# Patient Record
Sex: MALE | Race: WHITE | NOT HISPANIC OR LATINO | Employment: OTHER | URBAN - METROPOLITAN AREA
[De-identification: names, ages, dates, MRNs, and addresses within clinical notes are randomized per-mention and may not be internally consistent; named-entity substitution may affect disease eponyms.]

---

## 2018-10-08 DIAGNOSIS — R74.01 ELEVATED SGOT (AST): Primary | ICD-10-CM

## 2018-10-12 ENCOUNTER — HOSPITAL ENCOUNTER (OUTPATIENT)
Dept: RADIOLOGY | Facility: HOSPITAL | Age: 78
Discharge: HOME | End: 2018-10-12
Attending: FAMILY MEDICINE
Payer: MEDICARE

## 2018-10-12 DIAGNOSIS — R74.01 ELEVATED SGOT (AST): ICD-10-CM

## 2018-10-12 PROCEDURE — 76705 ECHO EXAM OF ABDOMEN: CPT | Mod: TC

## 2018-10-12 PROCEDURE — 76705 ECHO EXAM OF ABDOMEN: CPT | Mod: 26,,, | Performed by: RADIOLOGY

## 2018-10-19 ENCOUNTER — LAB VISIT (OUTPATIENT)
Dept: LAB | Facility: HOSPITAL | Age: 78
End: 2018-10-19
Attending: INTERNAL MEDICINE
Payer: MEDICARE

## 2018-10-19 ENCOUNTER — OFFICE VISIT (OUTPATIENT)
Dept: FAMILY MEDICINE | Facility: CLINIC | Age: 78
End: 2018-10-19
Payer: MEDICARE

## 2018-10-19 VITALS
DIASTOLIC BLOOD PRESSURE: 90 MMHG | HEART RATE: 77 BPM | OXYGEN SATURATION: 97 % | WEIGHT: 157.19 LBS | BODY MASS INDEX: 23.28 KG/M2 | SYSTOLIC BLOOD PRESSURE: 150 MMHG | HEIGHT: 69 IN | TEMPERATURE: 98 F

## 2018-10-19 DIAGNOSIS — I10 HYPERTENSION, ESSENTIAL, BENIGN: ICD-10-CM

## 2018-10-19 DIAGNOSIS — Z13.220 LIPID SCREENING: ICD-10-CM

## 2018-10-19 DIAGNOSIS — Z13.6 ENCOUNTER FOR SCREENING FOR CARDIOVASCULAR DISORDERS: ICD-10-CM

## 2018-10-19 DIAGNOSIS — L60.9 FINGERNAIL ABNORMALITIES: Primary | ICD-10-CM

## 2018-10-19 DIAGNOSIS — R79.9 ABNORMAL FINDING OF BLOOD CHEMISTRY: ICD-10-CM

## 2018-10-19 DIAGNOSIS — N50.812 LEFT TESTICULAR PAIN: ICD-10-CM

## 2018-10-19 DIAGNOSIS — F33.1 MODERATE EPISODE OF RECURRENT MAJOR DEPRESSIVE DISORDER: ICD-10-CM

## 2018-10-19 DIAGNOSIS — M79.7 FIBROMYALGIA: ICD-10-CM

## 2018-10-19 LAB
ALBUMIN SERPL BCP-MCNC: 4.2 G/DL
ALP SERPL-CCNC: 69 U/L
ALT SERPL W/O P-5'-P-CCNC: 23 U/L
ANION GAP SERPL CALC-SCNC: 9 MMOL/L
AST SERPL-CCNC: 23 U/L
BASOPHILS # BLD AUTO: 0.03 K/UL
BASOPHILS NFR BLD: 0.5 %
BILIRUB SERPL-MCNC: 0.8 MG/DL
BUN SERPL-MCNC: 28 MG/DL
CALCIUM SERPL-MCNC: 9.7 MG/DL
CHLORIDE SERPL-SCNC: 107 MMOL/L
CHOLEST SERPL-MCNC: 271 MG/DL
CHOLEST/HDLC SERPL: 8 {RATIO}
CO2 SERPL-SCNC: 25 MMOL/L
CREAT SERPL-MCNC: 1.1 MG/DL
DIFFERENTIAL METHOD: ABNORMAL
EOSINOPHIL # BLD AUTO: 0.1 K/UL
EOSINOPHIL NFR BLD: 2.1 %
ERYTHROCYTE [DISTWIDTH] IN BLOOD BY AUTOMATED COUNT: 13.7 %
EST. GFR  (AFRICAN AMERICAN): >60 ML/MIN/1.73 M^2
EST. GFR  (NON AFRICAN AMERICAN): >60 ML/MIN/1.73 M^2
GLUCOSE SERPL-MCNC: 95 MG/DL
HCT VFR BLD AUTO: 43.8 %
HDLC SERPL-MCNC: 34 MG/DL
HDLC SERPL: 12.5 %
HGB BLD-MCNC: 15 G/DL
LDLC SERPL CALC-MCNC: 178.6 MG/DL
LYMPHOCYTES # BLD AUTO: 1.3 K/UL
LYMPHOCYTES NFR BLD: 21.9 %
MCH RBC QN AUTO: 31.6 PG
MCHC RBC AUTO-ENTMCNC: 34.2 G/DL
MCV RBC AUTO: 92 FL
MONOCYTES # BLD AUTO: 0.3 K/UL
MONOCYTES NFR BLD: 5.5 %
NEUTROPHILS # BLD AUTO: 4.1 K/UL
NEUTROPHILS NFR BLD: 70 %
NONHDLC SERPL-MCNC: 237 MG/DL
PLATELET # BLD AUTO: 202 K/UL
PMV BLD AUTO: 9.3 FL
POTASSIUM SERPL-SCNC: 4.5 MMOL/L
PROT SERPL-MCNC: 7.2 G/DL
RBC # BLD AUTO: 4.75 M/UL
SODIUM SERPL-SCNC: 141 MMOL/L
TRIGL SERPL-MCNC: 292 MG/DL
TSH SERPL DL<=0.005 MIU/L-ACNC: 2.55 UIU/ML
WBC # BLD AUTO: 5.79 K/UL

## 2018-10-19 PROCEDURE — 80061 LIPID PANEL: CPT

## 2018-10-19 PROCEDURE — 36415 COLL VENOUS BLD VENIPUNCTURE: CPT

## 2018-10-19 PROCEDURE — 99214 OFFICE O/P EST MOD 30 MIN: CPT | Mod: PBBFAC,PO | Performed by: INTERNAL MEDICINE

## 2018-10-19 PROCEDURE — 99999 PR PBB SHADOW E&M-EST. PATIENT-LVL IV: CPT | Mod: PBBFAC,,, | Performed by: INTERNAL MEDICINE

## 2018-10-19 PROCEDURE — 1101F PT FALLS ASSESS-DOCD LE1/YR: CPT | Mod: CPTII,,, | Performed by: INTERNAL MEDICINE

## 2018-10-19 PROCEDURE — 3080F DIAST BP >= 90 MM HG: CPT | Mod: CPTII,,, | Performed by: INTERNAL MEDICINE

## 2018-10-19 PROCEDURE — 80053 COMPREHEN METABOLIC PANEL: CPT

## 2018-10-19 PROCEDURE — 3077F SYST BP >= 140 MM HG: CPT | Mod: CPTII,,, | Performed by: INTERNAL MEDICINE

## 2018-10-19 PROCEDURE — 83036 HEMOGLOBIN GLYCOSYLATED A1C: CPT

## 2018-10-19 PROCEDURE — 99204 OFFICE O/P NEW MOD 45 MIN: CPT | Mod: S$PBB,,, | Performed by: INTERNAL MEDICINE

## 2018-10-19 PROCEDURE — 85025 COMPLETE CBC W/AUTO DIFF WBC: CPT

## 2018-10-19 PROCEDURE — 84443 ASSAY THYROID STIM HORMONE: CPT
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
## 2018-10-20 LAB
ESTIMATED AVG GLUCOSE: 108 MG/DL
HBA1C MFR BLD HPLC: 5.4 %

## 2018-10-21 DIAGNOSIS — E78.2 MIXED HYPERLIPIDEMIA: Primary | ICD-10-CM

## 2018-10-21 RX ORDER — ATORVASTATIN CALCIUM 40 MG/1
40 TABLET, FILM COATED ORAL DAILY
Qty: 90 TABLET | Refills: 3 | Status: SHIPPED | OUTPATIENT
Start: 2018-10-21 | End: 2018-11-15

## 2018-11-07 ENCOUNTER — HOSPITAL ENCOUNTER (OUTPATIENT)
Dept: RADIOLOGY | Facility: HOSPITAL | Age: 78
Discharge: HOME | End: 2018-11-07
Attending: INTERNAL MEDICINE
Payer: MEDICARE

## 2018-11-07 DIAGNOSIS — N50.812 LEFT TESTICULAR PAIN: ICD-10-CM

## 2018-11-07 DIAGNOSIS — N43.3 HYDROCELE, UNSPECIFIED HYDROCELE TYPE: Primary | ICD-10-CM

## 2018-11-07 PROCEDURE — 76870 US EXAM SCROTUM: CPT | Mod: TC

## 2018-11-07 PROCEDURE — 76870 US EXAM SCROTUM: CPT | Mod: 26,,, | Performed by: RADIOLOGY

## 2018-11-15 ENCOUNTER — OFFICE VISIT (OUTPATIENT)
Dept: UROLOGY | Facility: CLINIC | Age: 78
End: 2018-11-15
Payer: MEDICARE

## 2018-11-15 VITALS
WEIGHT: 156.94 LBS | HEIGHT: 69 IN | HEART RATE: 70 BPM | BODY MASS INDEX: 23.25 KG/M2 | SYSTOLIC BLOOD PRESSURE: 138 MMHG | DIASTOLIC BLOOD PRESSURE: 80 MMHG

## 2018-11-15 DIAGNOSIS — N50.812 LEFT TESTICULAR PAIN: Primary | ICD-10-CM

## 2018-11-15 DIAGNOSIS — N43.3 BILATERAL HYDROCELE: ICD-10-CM

## 2018-11-15 PROCEDURE — 3075F SYST BP GE 130 - 139MM HG: CPT | Mod: CPTII,S$GLB,, | Performed by: NURSE PRACTITIONER

## 2018-11-15 PROCEDURE — 99204 OFFICE O/P NEW MOD 45 MIN: CPT | Mod: S$GLB,,, | Performed by: NURSE PRACTITIONER

## 2018-11-15 PROCEDURE — 1101F PT FALLS ASSESS-DOCD LE1/YR: CPT | Mod: CPTII,S$GLB,, | Performed by: NURSE PRACTITIONER

## 2018-11-15 PROCEDURE — 3079F DIAST BP 80-89 MM HG: CPT | Mod: CPTII,S$GLB,, | Performed by: NURSE PRACTITIONER

## 2018-11-15 PROCEDURE — 99999 PR PBB SHADOW E&M-EST. PATIENT-LVL III: CPT | Mod: PBBFAC,,, | Performed by: NURSE PRACTITIONER

## 2018-11-15 NOTE — LETTER
November 15, 2018      Antonieta Khalil MD  [address] [address]  [address]  [place] [address]           [facility]  [address] [facility] [address]  [place] [address]  Phone: [phone]  Fax: [phone]          Patient: Kierra Daugherty   MR Number: [id number]   YOB: 1940   Date of Visit: 11/15/2018       Dear Dr. Antonieta Khalil:    Thank you for referring Kierra Daugherty to me for evaluation. Attached you will find relevant portions of my assessment and plan of care.    If you have questions, please do not hesitate to call me. I look forward to following Kierra Daugherty along with you.    Sincerely,    Regina Hoover, GENET    Enclosure  CC:  No Recipients    If you would like to receive this communication electronically, please contact [email or web address] or [phone] to request more information on [employer] Link access.    For providers and/or their staff who would like to refer a patient to [facility], please contact us through our one-stop-shop provider referral line, [facility], at [phone].    If you feel you have received this communication in error or would no longer like to receive these types of communications, please e-mail [email or web address]

## 2018-11-15 NOTE — PROGRESS NOTES
"Subjective:       Patient ID: Kierra Daugherty is a 78 y.o. male who is a new patient was referred by Antonieta Khalil MD for evaluation of left testicular pain    Chief Complaint:   Chief Complaint   Patient presents with    Other     new pt coming in for hydrocele      Left testicular pain  Patient reports L testicular pain for "many years". Pain is aggravated by movement. He tells me that pain is alleviated with repositioning of scrotum. Denies any associated swelling with pain. There has not been any recent trauma Scrotal ultrasound ordered by PCP. Imaging showed bilateral small hydroceles and bilateral epididymal cysts. Denies dysuria or gross hematuria. There is not a history kidney stones. Patient tells me that he has taken himself off of all meds, because he says that medicine does not agree with him.  Patient repeatedly states that he thought he was coming to see a neurologist today. He is unsure why an appointment was made with urology     ACTIVE MEDICAL ISSUES:  Patient Active Problem List   Diagnosis    Idiopathic peripheral neuropathy    Fibromyalgia    Mixed hyperlipidemia    Left testicular pain    Bilateral hydrocele       PAST MEDICAL HISTORY  Past Medical History:   Diagnosis Date    Chronic kidney disease     Fibromyalgia     Fibromyalgia     HSV (herpes simplex virus) anogenital infection     Hypertension     does not take medication    Mental disorder     history of depression       PAST SURGICAL HISTORY:  Past Surgical History:   Procedure Laterality Date    APPENDECTOMY      Collapse Lung  1969    GUM SURGERY      HERNIA REPAIR      Left Shoulder      REPAIR, HERNIA, INGUINAL, WITHOUT HISTORY OF PRIOR REPAIR, AGE 6 YEARS OR OLDER Right 7/23/2013    Performed by [facility] at [facility] OR    tonsilect      TONSILLECTOMY, ADENOIDECTOMY         SOCIAL HISTORY:  Social History     Tobacco Use    Smoking status: Former Smoker     Start date: 11/8/1952     Last attempt to " quit: 4/10/1983     Years since quittin.6    Smokeless tobacco: Never Used   Substance Use Topics    Alcohol use: No     Comment: Heavy at one time.    Drug use: No       FAMILY HISTORY:  Family History   Problem Relation Age of Onset    Stroke Mother     Heart disease Mother     Cancer Father     Liver disease Sister     Neuropathy Sister     Heart disease Brother     Cancer Brother        ALLERGIES AND MEDICATIONS: updated and reviewed.  Review of patient's allergies indicates:   Allergen Reactions    Cymbalta [duloxetine] Other (See Comments)     Kidney failure    Iodine and iodide containing products Other (See Comments)     Chest, bladder pain    Latex, natural rubber Other (See Comments)     unknown    Asa [aspirin]      No rash, but feels bad when he takes it     No current outpatient medications on file.     No current facility-administered medications for this visit.        Review of Systems   Constitutional: Negative for activity change, chills, fatigue, fever and unexpected weight change.   Eyes: Negative for discharge, redness and visual disturbance.   Respiratory: Negative for cough, shortness of breath and wheezing.    Cardiovascular: Negative for chest pain and leg swelling.   Gastrointestinal: Negative for abdominal distention, abdominal pain, constipation, diarrhea, nausea and vomiting.   Genitourinary: Positive for testicular pain (left sided). Negative for difficulty urinating, discharge, dysuria, flank pain, frequency, hematuria, penile pain, penile swelling, scrotal swelling and urgency.   Musculoskeletal: Negative for arthralgias, joint swelling and myalgias.   Skin: Negative for color change and rash.   Neurological: Negative for dizziness and light-headedness.   Psychiatric/Behavioral: Negative for behavioral problems and confusion. The patient is not nervous/anxious.        Objective:      Vitals:    11/15/18 1446   BP: 138/80   Pulse: 70   Weight: 71.2 kg (156 lb 15.5 oz)  "  Height: 5' 9" (1.753 m)     Physical Exam   Constitutional: He is oriented to person, place, and time. He appears well-developed.   HENT:   Head: Normocephalic and atraumatic.   Nose: Nose normal.   Eyes: Conjunctivae are normal. Right eye exhibits no discharge. Left eye exhibits no discharge.   Neck: Normal range of motion. Neck supple. No tracheal deviation present. No thyromegaly present.   Cardiovascular: Normal rate and regular rhythm.    Pulmonary/Chest: Effort normal. No respiratory distress. He has no wheezes.   Abdominal: Soft. He exhibits no distension. There is no hepatosplenomegaly. There is no tenderness. There is no CVA tenderness. No hernia.   Genitourinary:   Genitourinary Comments: Patient declined exam   Musculoskeletal: Normal range of motion. He exhibits no edema.   Neurological: He is alert and oriented to person, place, and time.   Skin: Skin is warm and dry. No rash noted. No erythema.     Psychiatric: He has a normal mood and affect. His behavior is normal. Judgment normal.       Urine dipstick shows patient unable to produce specimen.    Narrative     EXAMINATION:  US SCROTUM AND TESTICLES    CLINICAL HISTORY:  Left testicular pain    TECHNIQUE:  Sonography of the scrotum and testes.    COMPARISON:  None.    FINDINGS:  Right Testicle:    Size: 3.9 x 3.1 x 2.6 cm    Appearance: Normal    Flow: Normal arterial and venous flow    Epididymis: 0.5 cm size cyst    Hydrocele: Small none    Varicocele: None    Left Testicle:    Size: 3.8 x 2.9 x 2.4 cm    Appearance: Normal    Flow: Normal arterial and venous flow    Epididymis: 3 cyst of 0.7, 0.4 and 0.4 cm size.    Hydrocele: Small    Varicocele: None   Impression       Bilateral pending mole cyst larger and more numerous on left.  Small bilateral hydroceles.      Electronically signed by: Juan Pablo Maravilla MD  Date: 11/07/2018  Time: 17:05     Reviewed with patient    Assessment:       1. Left testicular pain    2. Bilateral hydrocele        "   Plan:       1. Left testicular pain  -MARQUES--bilateral small hydroceles, bilateral epididymal cysts  -Recommend conservative treatment--NSAIDs prn, scrotal support/elevate scrotum, Ice. Patient refuses  - POCT urinalysis, dipstick or tablet reag    2. Bilateral hydrocele  -MARQUES--bilateral small hydroceles  -Recommend conservative treatment--NSAIDs prn, scrotal support/elevate scrotum, Ice. Patient refuses  -Discussed expected course of hydroceles  -Numerous attempts made to explain to patient the recommended treatment for hydroceles, patient continues to make belittling statements regarding being cared for by a female nurse practitioner.   -Patient offered a follow up appointment with Dr. Connolly--he declined          Follow-up in about 3 months (around 2/15/2019) for Follow up.

## 2021-05-18 ENCOUNTER — HOSPITAL ENCOUNTER (EMERGENCY)
Facility: HOSPITAL | Age: 81
Discharge: HOME | End: 2021-05-18
Attending: EMERGENCY MEDICINE
Payer: MEDICARE

## 2021-05-18 VITALS
DIASTOLIC BLOOD PRESSURE: 83 MMHG | BODY MASS INDEX: 23.7 KG/M2 | WEIGHT: 160 LBS | HEIGHT: 69 IN | RESPIRATION RATE: 21 BRPM | OXYGEN SATURATION: 97 % | HEART RATE: 69 BPM | TEMPERATURE: 98 F | SYSTOLIC BLOOD PRESSURE: 174 MMHG

## 2021-05-18 DIAGNOSIS — I10 HYPERTENSION, UNSPECIFIED TYPE: ICD-10-CM

## 2021-05-18 DIAGNOSIS — R51.9 HEADACHE: Primary | ICD-10-CM

## 2021-05-18 LAB
ALBUMIN SERPL BCP-MCNC: 4.1 G/DL (ref 3.5–5.2)
ALP SERPL-CCNC: 85 U/L (ref 55–135)
ALT SERPL W/O P-5'-P-CCNC: 16 U/L (ref 10–44)
ANION GAP SERPL CALC-SCNC: 9 MMOL/L (ref 8–16)
AST SERPL-CCNC: 23 U/L (ref 10–40)
BASOPHILS # BLD AUTO: 0.04 K/UL (ref 0–0.2)
BASOPHILS NFR BLD: 0.6 % (ref 0–1.9)
BILIRUB SERPL-MCNC: 0.5 MG/DL (ref 0.1–1)
BILIRUB UR QL STRIP: NEGATIVE
BUN SERPL-MCNC: 22 MG/DL (ref 8–23)
CALCIUM SERPL-MCNC: 9.7 MG/DL (ref 8.7–10.5)
CHLORIDE SERPL-SCNC: 106 MMOL/L (ref 95–110)
CLARITY UR: CLEAR
CO2 SERPL-SCNC: 25 MMOL/L (ref 23–29)
COLOR UR: COLORLESS
CREAT SERPL-MCNC: 1 MG/DL (ref 0.5–1.4)
DIFFERENTIAL METHOD: ABNORMAL
EOSINOPHIL # BLD AUTO: 0 K/UL (ref 0–0.5)
EOSINOPHIL NFR BLD: 0.6 % (ref 0–8)
ERYTHROCYTE [DISTWIDTH] IN BLOOD BY AUTOMATED COUNT: 12.5 % (ref 11.5–14.5)
EST. GFR  (AFRICAN AMERICAN): >60 ML/MIN/1.73 M^2
EST. GFR  (NON AFRICAN AMERICAN): >60 ML/MIN/1.73 M^2
GLUCOSE SERPL-MCNC: 105 MG/DL (ref 70–110)
GLUCOSE UR QL STRIP: NEGATIVE
HCT VFR BLD AUTO: 43.5 % (ref 40–54)
HGB BLD-MCNC: 14.7 G/DL (ref 14–18)
HGB UR QL STRIP: ABNORMAL
IMM GRANULOCYTES # BLD AUTO: 0.04 K/UL (ref 0–0.04)
IMM GRANULOCYTES NFR BLD AUTO: 0.6 % (ref 0–0.5)
KETONES UR QL STRIP: NEGATIVE
LEUKOCYTE ESTERASE UR QL STRIP: NEGATIVE
LYMPHOCYTES # BLD AUTO: 1.1 K/UL (ref 1–4.8)
LYMPHOCYTES NFR BLD: 17.9 % (ref 18–48)
MCH RBC QN AUTO: 31.3 PG (ref 27–31)
MCHC RBC AUTO-ENTMCNC: 33.8 G/DL (ref 32–36)
MCV RBC AUTO: 93 FL (ref 82–98)
MONOCYTES # BLD AUTO: 0.3 K/UL (ref 0.3–1)
MONOCYTES NFR BLD: 5.2 % (ref 4–15)
NEUTROPHILS # BLD AUTO: 4.7 K/UL (ref 1.8–7.7)
NEUTROPHILS NFR BLD: 75.1 % (ref 38–73)
NITRITE UR QL STRIP: NEGATIVE
NRBC BLD-RTO: 0 /100 WBC
PH UR STRIP: 7 [PH] (ref 5–8)
PLATELET # BLD AUTO: 242 K/UL (ref 150–450)
PMV BLD AUTO: 9 FL (ref 9.2–12.9)
POTASSIUM SERPL-SCNC: 4.3 MMOL/L (ref 3.5–5.1)
PROT SERPL-MCNC: 7.1 G/DL (ref 6–8.4)
PROT UR QL STRIP: NEGATIVE
RBC # BLD AUTO: 4.7 M/UL (ref 4.6–6.2)
SODIUM SERPL-SCNC: 140 MMOL/L (ref 136–145)
SP GR UR STRIP: 1.01 (ref 1–1.03)
URN SPEC COLLECT METH UR: ABNORMAL
UROBILINOGEN UR STRIP-ACNC: NEGATIVE EU/DL
WBC # BLD AUTO: 6.21 K/UL (ref 3.9–12.7)

## 2021-05-18 PROCEDURE — 93010 ELECTROCARDIOGRAM REPORT: CPT | Mod: ,,, | Performed by: INTERNAL MEDICINE

## 2021-05-18 PROCEDURE — 93010 EKG 12-LEAD: ICD-10-PCS | Mod: ,,, | Performed by: INTERNAL MEDICINE

## 2021-05-18 PROCEDURE — 25000003 PHARM REV CODE 250: Performed by: EMERGENCY MEDICINE

## 2021-05-18 PROCEDURE — 96375 TX/PRO/DX INJ NEW DRUG ADDON: CPT

## 2021-05-18 PROCEDURE — 96376 TX/PRO/DX INJ SAME DRUG ADON: CPT

## 2021-05-18 PROCEDURE — 93005 ELECTROCARDIOGRAM TRACING: CPT

## 2021-05-18 PROCEDURE — 80053 COMPREHEN METABOLIC PANEL: CPT | Performed by: EMERGENCY MEDICINE

## 2021-05-18 PROCEDURE — 81003 URINALYSIS AUTO W/O SCOPE: CPT | Performed by: EMERGENCY MEDICINE

## 2021-05-18 PROCEDURE — 99284 EMERGENCY DEPT VISIT MOD MDM: CPT | Mod: 25

## 2021-05-18 PROCEDURE — 85025 COMPLETE CBC W/AUTO DIFF WBC: CPT | Performed by: EMERGENCY MEDICINE

## 2021-05-18 PROCEDURE — 96374 THER/PROPH/DIAG INJ IV PUSH: CPT

## 2021-05-18 PROCEDURE — 96361 HYDRATE IV INFUSION ADD-ON: CPT

## 2021-05-18 PROCEDURE — 63600175 PHARM REV CODE 636 W HCPCS: Performed by: EMERGENCY MEDICINE

## 2021-05-18 RX ORDER — DIPHENHYDRAMINE HYDROCHLORIDE 50 MG/ML
12.5 INJECTION INTRAMUSCULAR; INTRAVENOUS
Status: COMPLETED | OUTPATIENT
Start: 2021-05-18 | End: 2021-05-18

## 2021-05-18 RX ORDER — AMLODIPINE BESYLATE 10 MG/1
10 TABLET ORAL DAILY
Qty: 30 TABLET | Refills: 0 | Status: SHIPPED | OUTPATIENT
Start: 2021-05-18 | End: 2022-10-20 | Stop reason: SDUPTHER

## 2021-05-18 RX ORDER — HYDRALAZINE HYDROCHLORIDE 20 MG/ML
10 INJECTION INTRAMUSCULAR; INTRAVENOUS
Status: COMPLETED | OUTPATIENT
Start: 2021-05-18 | End: 2021-05-18

## 2021-05-18 RX ORDER — HALOPERIDOL 5 MG/ML
2.5 INJECTION INTRAMUSCULAR
Status: COMPLETED | OUTPATIENT
Start: 2021-05-18 | End: 2021-05-18

## 2021-05-18 RX ORDER — LABETALOL HYDROCHLORIDE 5 MG/ML
10 INJECTION, SOLUTION INTRAVENOUS
Status: COMPLETED | OUTPATIENT
Start: 2021-05-18 | End: 2021-05-18

## 2021-05-18 RX ORDER — KETOROLAC TROMETHAMINE 30 MG/ML
15 INJECTION, SOLUTION INTRAMUSCULAR; INTRAVENOUS
Status: COMPLETED | OUTPATIENT
Start: 2021-05-18 | End: 2021-05-18

## 2021-05-18 RX ADMIN — DIPHENHYDRAMINE HYDROCHLORIDE 12.5 MG: 50 INJECTION INTRAMUSCULAR; INTRAVENOUS at 10:05

## 2021-05-18 RX ADMIN — SODIUM CHLORIDE 500 ML: 0.9 INJECTION, SOLUTION INTRAVENOUS at 06:05

## 2021-05-18 RX ADMIN — HALOPERIDOL LACTATE 2.5 MG: 5 INJECTION, SOLUTION INTRAMUSCULAR at 09:05

## 2021-05-18 RX ADMIN — LABETALOL HYDROCHLORIDE 10 MG: 5 INJECTION INTRAVENOUS at 10:05

## 2021-05-18 RX ADMIN — HYDRALAZINE HYDROCHLORIDE 10 MG: 20 INJECTION, SOLUTION INTRAMUSCULAR; INTRAVENOUS at 10:05

## 2021-05-18 RX ADMIN — HALOPERIDOL LACTATE 2.5 MG: 5 INJECTION, SOLUTION INTRAMUSCULAR at 10:05

## 2021-05-18 RX ADMIN — KETOROLAC TROMETHAMINE 15 MG: 30 INJECTION, SOLUTION INTRAMUSCULAR; INTRAVENOUS at 06:05

## 2021-06-16 ENCOUNTER — HOSPITAL ENCOUNTER (EMERGENCY)
Facility: HOSPITAL | Age: 81
Discharge: HOME | End: 2021-06-16
Attending: EMERGENCY MEDICINE
Payer: MEDICARE

## 2021-06-16 VITALS
HEART RATE: 92 BPM | SYSTOLIC BLOOD PRESSURE: 140 MMHG | RESPIRATION RATE: 18 BRPM | WEIGHT: 160 LBS | OXYGEN SATURATION: 95 % | TEMPERATURE: 99 F | DIASTOLIC BLOOD PRESSURE: 76 MMHG | BODY MASS INDEX: 23.63 KG/M2

## 2021-06-16 DIAGNOSIS — N48.1 BALANITIS: Primary | ICD-10-CM

## 2021-06-16 PROCEDURE — 99284 EMERGENCY DEPT VISIT MOD MDM: CPT

## 2021-06-16 RX ORDER — NYSTATIN 100000 U/G
CREAM TOPICAL 2 TIMES DAILY
Qty: 30 G | Refills: 0 | Status: ON HOLD | OUTPATIENT
Start: 2021-06-16 | End: 2022-10-23

## 2021-06-16 RX ORDER — DOXYCYCLINE 100 MG/1
100 CAPSULE ORAL 2 TIMES DAILY
Qty: 20 CAPSULE | Refills: 0 | Status: SHIPPED | OUTPATIENT
Start: 2021-06-16 | End: 2021-06-26

## 2022-10-20 ENCOUNTER — HOSPITAL ENCOUNTER (INPATIENT)
Facility: HOSPITAL | Age: 82
LOS: 4 days | Discharge: PSYCHIATRIC OR SUBSTANCE USE FACILITY | DRG: 305 | End: 2022-10-24
Attending: EMERGENCY MEDICINE | Admitting: HOSPITALIST
Payer: MEDICARE

## 2022-10-20 DIAGNOSIS — I48.91 A-FIB: ICD-10-CM

## 2022-10-20 DIAGNOSIS — Z86.79 ATRIAL FIBRILLATION, CURRENTLY IN SINUS RHYTHM: ICD-10-CM

## 2022-10-20 DIAGNOSIS — I63.9 STROKE: ICD-10-CM

## 2022-10-20 DIAGNOSIS — I16.1 HYPERTENSIVE EMERGENCY: Primary | ICD-10-CM

## 2022-10-20 PROBLEM — Z86.73 HISTORY OF CVA (CEREBROVASCULAR ACCIDENT): Status: ACTIVE | Noted: 2022-10-20

## 2022-10-20 LAB
ALBUMIN SERPL BCP-MCNC: 3.9 G/DL (ref 3.5–5.2)
ALLENS TEST: ABNORMAL
ALP SERPL-CCNC: 75 U/L (ref 55–135)
ALT SERPL W/O P-5'-P-CCNC: 22 U/L (ref 10–44)
ANION GAP SERPL CALC-SCNC: 11 MMOL/L (ref 8–16)
ANION GAP SERPL CALC-SCNC: 12 MMOL/L (ref 8–16)
APTT BLDCRRT: 25 SEC (ref 21–32)
AST SERPL-CCNC: 33 U/L (ref 10–40)
BASOPHILS # BLD AUTO: 0.04 K/UL (ref 0–0.2)
BASOPHILS NFR BLD: 0.7 % (ref 0–1.9)
BILIRUB SERPL-MCNC: 0.3 MG/DL (ref 0.1–1)
BILIRUB UR QL STRIP: NEGATIVE
BNP SERPL-MCNC: 40 PG/ML (ref 0–99)
BUN SERPL-MCNC: 27 MG/DL (ref 8–23)
BUN SERPL-MCNC: 28 MG/DL (ref 6–30)
CALCIUM SERPL-MCNC: 8.8 MG/DL (ref 8.7–10.5)
CHLORIDE SERPL-SCNC: 109 MMOL/L (ref 95–110)
CHLORIDE SERPL-SCNC: 109 MMOL/L (ref 95–110)
CHOLEST SERPL-MCNC: 231 MG/DL (ref 120–199)
CHOLEST/HDLC SERPL: 7 {RATIO} (ref 2–5)
CLARITY UR: CLEAR
CO2 SERPL-SCNC: 21 MMOL/L (ref 23–29)
COLOR UR: COLORLESS
CREAT SERPL-MCNC: 1.2 MG/DL (ref 0.5–1.4)
CREAT SERPL-MCNC: 1.2 MG/DL (ref 0.5–1.4)
CTP QC/QA: YES
DIFFERENTIAL METHOD: ABNORMAL
EOSINOPHIL # BLD AUTO: 0.1 K/UL (ref 0–0.5)
EOSINOPHIL NFR BLD: 2.2 % (ref 0–8)
ERYTHROCYTE [DISTWIDTH] IN BLOOD BY AUTOMATED COUNT: 13 % (ref 11.5–14.5)
EST. GFR  (NO RACE VARIABLE): >60 ML/MIN/1.73 M^2
GLUCOSE SERPL-MCNC: 96 MG/DL (ref 70–110)
GLUCOSE SERPL-MCNC: 98 MG/DL (ref 70–110)
GLUCOSE UR QL STRIP: NEGATIVE
HCO3 UR-SCNC: 28.2 MMOL/L (ref 24–28)
HCT VFR BLD AUTO: 40.6 % (ref 40–54)
HCT VFR BLD CALC: 41 %PCV (ref 36–54)
HDLC SERPL-MCNC: 33 MG/DL (ref 40–75)
HDLC SERPL: 14.3 % (ref 20–50)
HGB BLD-MCNC: 14.2 G/DL (ref 14–18)
HGB UR QL STRIP: NEGATIVE
IMM GRANULOCYTES # BLD AUTO: 0.03 K/UL (ref 0–0.04)
IMM GRANULOCYTES NFR BLD AUTO: 0.5 % (ref 0–0.5)
INR PPP: 0.9 (ref 0.8–1.2)
KETONES UR QL STRIP: NEGATIVE
LDLC SERPL CALC-MCNC: ABNORMAL MG/DL (ref 63–159)
LEUKOCYTE ESTERASE UR QL STRIP: NEGATIVE
LYMPHOCYTES # BLD AUTO: 1.6 K/UL (ref 1–4.8)
LYMPHOCYTES NFR BLD: 27 % (ref 18–48)
MCH RBC QN AUTO: 31.8 PG (ref 27–31)
MCHC RBC AUTO-ENTMCNC: 35 G/DL (ref 32–36)
MCV RBC AUTO: 91 FL (ref 82–98)
MONOCYTES # BLD AUTO: 0.3 K/UL (ref 0.3–1)
MONOCYTES NFR BLD: 5.4 % (ref 4–15)
NEUTROPHILS # BLD AUTO: 3.8 K/UL (ref 1.8–7.7)
NEUTROPHILS NFR BLD: 64.2 % (ref 38–73)
NITRITE UR QL STRIP: NEGATIVE
NONHDLC SERPL-MCNC: 198 MG/DL
NRBC BLD-RTO: 0 /100 WBC
PCO2 BLDA: 51.8 MMHG (ref 35–45)
PH SMN: 7.34 [PH] (ref 7.35–7.45)
PH UR STRIP: 7 [PH] (ref 5–8)
PLATELET # BLD AUTO: 192 K/UL (ref 150–450)
PMV BLD AUTO: 8.8 FL (ref 9.2–12.9)
PO2 BLDA: 28 MMHG (ref 40–60)
POC BE: 2 MMOL/L
POC IONIZED CALCIUM: 1.22 MMOL/L (ref 1.06–1.42)
POC SATURATED O2: 47 % (ref 95–100)
POC TCO2 (MEASURED): 26 MMOL/L (ref 23–29)
POC TCO2: 30 MMOL/L (ref 24–29)
POTASSIUM BLD-SCNC: 4.9 MMOL/L (ref 3.5–5.1)
POTASSIUM SERPL-SCNC: 4.8 MMOL/L (ref 3.5–5.1)
PROT SERPL-MCNC: 7.1 G/DL (ref 6–8.4)
PROT UR QL STRIP: NEGATIVE
PROTHROMBIN TIME: 9.8 SEC (ref 9–12.5)
RBC # BLD AUTO: 4.46 M/UL (ref 4.6–6.2)
SAMPLE: ABNORMAL
SAMPLE: NORMAL
SARS-COV-2 RDRP RESP QL NAA+PROBE: NEGATIVE
SITE: ABNORMAL
SODIUM BLD-SCNC: 141 MMOL/L (ref 136–145)
SODIUM SERPL-SCNC: 141 MMOL/L (ref 136–145)
SP GR UR STRIP: 1.01 (ref 1–1.03)
TRIGL SERPL-MCNC: 470 MG/DL (ref 30–150)
TROPONIN I SERPL DL<=0.01 NG/ML-MCNC: <0.006 NG/ML (ref 0–0.03)
TSH SERPL DL<=0.005 MIU/L-ACNC: 2.44 UIU/ML (ref 0.4–4)
URN SPEC COLLECT METH UR: ABNORMAL
UROBILINOGEN UR STRIP-ACNC: NEGATIVE EU/DL
WBC # BLD AUTO: 5.92 K/UL (ref 3.9–12.7)

## 2022-10-20 PROCEDURE — 93010 ELECTROCARDIOGRAM REPORT: CPT | Mod: ,,, | Performed by: INTERNAL MEDICINE

## 2022-10-20 PROCEDURE — 84443 ASSAY THYROID STIM HORMONE: CPT | Performed by: EMERGENCY MEDICINE

## 2022-10-20 PROCEDURE — 85610 PROTHROMBIN TIME: CPT | Performed by: EMERGENCY MEDICINE

## 2022-10-20 PROCEDURE — 63600175 PHARM REV CODE 636 W HCPCS: Performed by: EMERGENCY MEDICINE

## 2022-10-20 PROCEDURE — G0508 PR CRITICAL CARE TELEHLTH INITIAL CONSULT 60MIN: ICD-10-PCS | Mod: 95,,, | Performed by: PSYCHIATRY & NEUROLOGY

## 2022-10-20 PROCEDURE — 81003 URINALYSIS AUTO W/O SCOPE: CPT | Performed by: EMERGENCY MEDICINE

## 2022-10-20 PROCEDURE — 25000003 PHARM REV CODE 250: Performed by: EMERGENCY MEDICINE

## 2022-10-20 PROCEDURE — 93010 EKG 12-LEAD: ICD-10-PCS | Mod: ,,, | Performed by: INTERNAL MEDICINE

## 2022-10-20 PROCEDURE — 99291 CRITICAL CARE FIRST HOUR: CPT | Mod: 25

## 2022-10-20 PROCEDURE — 83880 ASSAY OF NATRIURETIC PEPTIDE: CPT | Performed by: EMERGENCY MEDICINE

## 2022-10-20 PROCEDURE — 82803 BLOOD GASES ANY COMBINATION: CPT

## 2022-10-20 PROCEDURE — 82330 ASSAY OF CALCIUM: CPT

## 2022-10-20 PROCEDURE — 93005 ELECTROCARDIOGRAM TRACING: CPT

## 2022-10-20 PROCEDURE — 96375 TX/PRO/DX INJ NEW DRUG ADDON: CPT

## 2022-10-20 PROCEDURE — 85730 THROMBOPLASTIN TIME PARTIAL: CPT | Performed by: EMERGENCY MEDICINE

## 2022-10-20 PROCEDURE — 96365 THER/PROPH/DIAG IV INF INIT: CPT

## 2022-10-20 PROCEDURE — 85025 COMPLETE CBC W/AUTO DIFF WBC: CPT | Performed by: EMERGENCY MEDICINE

## 2022-10-20 PROCEDURE — G0508 CRIT CARE TELEHEA CONSULT 60: HCPCS | Mod: 95,,, | Performed by: PSYCHIATRY & NEUROLOGY

## 2022-10-20 PROCEDURE — 25000003 PHARM REV CODE 250: Performed by: STUDENT IN AN ORGANIZED HEALTH CARE EDUCATION/TRAINING PROGRAM

## 2022-10-20 PROCEDURE — 84295 ASSAY OF SERUM SODIUM: CPT

## 2022-10-20 PROCEDURE — 80053 COMPREHEN METABOLIC PANEL: CPT | Performed by: EMERGENCY MEDICINE

## 2022-10-20 PROCEDURE — 85014 HEMATOCRIT: CPT

## 2022-10-20 PROCEDURE — 84132 ASSAY OF SERUM POTASSIUM: CPT

## 2022-10-20 PROCEDURE — 82565 ASSAY OF CREATININE: CPT

## 2022-10-20 PROCEDURE — 20000000 HC ICU ROOM

## 2022-10-20 PROCEDURE — 96366 THER/PROPH/DIAG IV INF ADDON: CPT

## 2022-10-20 PROCEDURE — 99900035 HC TECH TIME PER 15 MIN (STAT)

## 2022-10-20 PROCEDURE — 87635 SARS-COV-2 COVID-19 AMP PRB: CPT | Performed by: EMERGENCY MEDICINE

## 2022-10-20 PROCEDURE — 84484 ASSAY OF TROPONIN QUANT: CPT | Performed by: EMERGENCY MEDICINE

## 2022-10-20 PROCEDURE — 80061 LIPID PANEL: CPT | Performed by: EMERGENCY MEDICINE

## 2022-10-20 RX ORDER — ONDANSETRON 2 MG/ML
4 INJECTION INTRAMUSCULAR; INTRAVENOUS EVERY 8 HOURS PRN
Status: DISCONTINUED | OUTPATIENT
Start: 2022-10-20 | End: 2022-10-21

## 2022-10-20 RX ORDER — CALCIUM GLUCONATE 20 MG/ML
2 INJECTION, SOLUTION INTRAVENOUS
Status: DISCONTINUED | OUTPATIENT
Start: 2022-10-20 | End: 2022-10-25 | Stop reason: HOSPADM

## 2022-10-20 RX ORDER — SODIUM CHLORIDE 0.9 % (FLUSH) 0.9 %
10 SYRINGE (ML) INJECTION
Status: DISCONTINUED | OUTPATIENT
Start: 2022-10-20 | End: 2022-10-25 | Stop reason: HOSPADM

## 2022-10-20 RX ORDER — CLONIDINE 0.2 MG/24H
1 PATCH, EXTENDED RELEASE TRANSDERMAL
Status: ON HOLD | COMMUNITY
Start: 2022-06-21 | End: 2022-10-23 | Stop reason: HOSPADM

## 2022-10-20 RX ORDER — TALC
6 POWDER (GRAM) TOPICAL NIGHTLY PRN
Status: DISCONTINUED | OUTPATIENT
Start: 2022-10-20 | End: 2022-10-25 | Stop reason: HOSPADM

## 2022-10-20 RX ORDER — IPRATROPIUM BROMIDE AND ALBUTEROL SULFATE 2.5; .5 MG/3ML; MG/3ML
3 SOLUTION RESPIRATORY (INHALATION) EVERY 4 HOURS PRN
Status: DISCONTINUED | OUTPATIENT
Start: 2022-10-20 | End: 2022-10-25 | Stop reason: HOSPADM

## 2022-10-20 RX ORDER — FAMOTIDINE 10 MG/ML
40 INJECTION INTRAVENOUS
Status: COMPLETED | OUTPATIENT
Start: 2022-10-20 | End: 2022-10-20

## 2022-10-20 RX ORDER — HYDROCODONE BITARTRATE AND ACETAMINOPHEN 5; 325 MG/1; MG/1
1 TABLET ORAL EVERY 4 HOURS PRN
Status: DISCONTINUED | OUTPATIENT
Start: 2022-10-20 | End: 2022-10-21

## 2022-10-20 RX ORDER — CALCIUM GLUCONATE 20 MG/ML
1 INJECTION, SOLUTION INTRAVENOUS
Status: DISCONTINUED | OUTPATIENT
Start: 2022-10-20 | End: 2022-10-25 | Stop reason: HOSPADM

## 2022-10-20 RX ORDER — CALCIUM GLUCONATE 20 MG/ML
3 INJECTION, SOLUTION INTRAVENOUS
Status: DISCONTINUED | OUTPATIENT
Start: 2022-10-20 | End: 2022-10-25 | Stop reason: HOSPADM

## 2022-10-20 RX ORDER — MAGNESIUM SULFATE HEPTAHYDRATE 40 MG/ML
4 INJECTION, SOLUTION INTRAVENOUS
Status: DISCONTINUED | OUTPATIENT
Start: 2022-10-20 | End: 2022-10-25 | Stop reason: HOSPADM

## 2022-10-20 RX ORDER — ACETAMINOPHEN 325 MG/1
650 TABLET ORAL EVERY 4 HOURS PRN
Status: DISCONTINUED | OUTPATIENT
Start: 2022-10-20 | End: 2022-10-25 | Stop reason: HOSPADM

## 2022-10-20 RX ORDER — HYDROCODONE BITARTRATE AND ACETAMINOPHEN 10; 325 MG/1; MG/1
1 TABLET ORAL EVERY 4 HOURS PRN
Status: DISCONTINUED | OUTPATIENT
Start: 2022-10-20 | End: 2022-10-21

## 2022-10-20 RX ORDER — MAGNESIUM SULFATE HEPTAHYDRATE 40 MG/ML
2 INJECTION, SOLUTION INTRAVENOUS
Status: DISCONTINUED | OUTPATIENT
Start: 2022-10-20 | End: 2022-10-25 | Stop reason: HOSPADM

## 2022-10-20 RX ORDER — ONDANSETRON 2 MG/ML
8 INJECTION INTRAMUSCULAR; INTRAVENOUS
Status: COMPLETED | OUTPATIENT
Start: 2022-10-20 | End: 2022-10-20

## 2022-10-20 RX ORDER — POTASSIUM CHLORIDE 7.45 MG/ML
80 INJECTION INTRAVENOUS
Status: DISCONTINUED | OUTPATIENT
Start: 2022-10-20 | End: 2022-10-25 | Stop reason: HOSPADM

## 2022-10-20 RX ORDER — NICARDIPINE HYDROCHLORIDE 0.2 MG/ML
0-15 INJECTION INTRAVENOUS CONTINUOUS
Status: DISCONTINUED | OUTPATIENT
Start: 2022-10-20 | End: 2022-10-21

## 2022-10-20 RX ORDER — AMLODIPINE BESYLATE 10 MG/1
5 TABLET ORAL
Status: ON HOLD | COMMUNITY
Start: 2022-08-30 | End: 2022-10-23 | Stop reason: SDUPTHER

## 2022-10-20 RX ORDER — DIPHENHYDRAMINE HCL 25 MG
25 CAPSULE ORAL EVERY 6 HOURS PRN
Status: DISCONTINUED | OUTPATIENT
Start: 2022-10-20 | End: 2022-10-21

## 2022-10-20 RX ORDER — HEPARIN SODIUM 5000 [USP'U]/ML
5000 INJECTION, SOLUTION INTRAVENOUS; SUBCUTANEOUS EVERY 8 HOURS
Status: DISCONTINUED | OUTPATIENT
Start: 2022-10-21 | End: 2022-10-21

## 2022-10-20 RX ORDER — POTASSIUM CHLORIDE 7.45 MG/ML
60 INJECTION INTRAVENOUS
Status: DISCONTINUED | OUTPATIENT
Start: 2022-10-20 | End: 2022-10-25 | Stop reason: HOSPADM

## 2022-10-20 RX ORDER — POTASSIUM CHLORIDE 7.45 MG/ML
40 INJECTION INTRAVENOUS
Status: DISCONTINUED | OUTPATIENT
Start: 2022-10-20 | End: 2022-10-25 | Stop reason: HOSPADM

## 2022-10-20 RX ORDER — ROSUVASTATIN CALCIUM 40 MG/1
40 TABLET, COATED ORAL NIGHTLY
Status: ON HOLD | COMMUNITY
Start: 2022-07-28 | End: 2022-10-23 | Stop reason: SDUPTHER

## 2022-10-20 RX ORDER — ATORVASTATIN CALCIUM 40 MG/1
40 TABLET, FILM COATED ORAL NIGHTLY
Status: DISCONTINUED | OUTPATIENT
Start: 2022-10-20 | End: 2022-10-25 | Stop reason: HOSPADM

## 2022-10-20 RX ORDER — FAMOTIDINE 10 MG/ML
20 INJECTION INTRAVENOUS EVERY 12 HOURS
Status: DISCONTINUED | OUTPATIENT
Start: 2022-10-20 | End: 2022-10-21

## 2022-10-20 RX ORDER — PROCHLORPERAZINE EDISYLATE 5 MG/ML
5 INJECTION INTRAMUSCULAR; INTRAVENOUS EVERY 6 HOURS PRN
Status: DISCONTINUED | OUTPATIENT
Start: 2022-10-20 | End: 2022-10-21

## 2022-10-20 RX ORDER — NICARDIPINE HYDROCHLORIDE 0.2 MG/ML
0-15 INJECTION INTRAVENOUS CONTINUOUS
Status: DISCONTINUED | OUTPATIENT
Start: 2022-10-20 | End: 2022-10-20

## 2022-10-20 RX ADMIN — FAMOTIDINE 20 MG: 10 INJECTION INTRAVENOUS at 09:10

## 2022-10-20 RX ADMIN — NICARDIPINE HYDROCHLORIDE 0.04 MG/HR: 0.2 INJECTION, SOLUTION INTRAVENOUS at 04:10

## 2022-10-20 RX ADMIN — NICARDIPINE HYDROCHLORIDE 5 MG/HR: 0.2 INJECTION, SOLUTION INTRAVENOUS at 10:10

## 2022-10-20 RX ADMIN — ONDANSETRON 8 MG: 2 INJECTION INTRAMUSCULAR; INTRAVENOUS at 04:10

## 2022-10-20 RX ADMIN — FAMOTIDINE 40 MG: 10 INJECTION INTRAVENOUS at 04:10

## 2022-10-20 NOTE — SUBJECTIVE & OBJECTIVE
"  Woke up with symptoms?: no    Recent bleeding noted: no  Does the patient take any Blood Thinners? no  Medications: No relevant medications      Past Medical History:   Past Medical History:   Diagnosis Date    Chronic kidney disease     Fibromyalgia     Fibromyalgia     HSV (herpes simplex virus) anogenital infection     Hypertension     does not take medication    Mental disorder     history of depression       Past Surgical History: no major surgeries within the last 2 weeks    Family History: no relevant history    Social History: no smoking, no drinking, no drugs    Allergies: Cymbalta [Duloxetine]  Iodine And Iodide Containing Products  Latex, Natural Rubber  Asa [Aspirin] No relevant allergies    Review of Systems   Constitutional: Negative for appetite change, chills and fever.   HENT: Negative for congestion and sore throat.    Eyes: Negative for discharge and itching.   Respiratory: Negative for apnea and shortness of breath.    Cardiovascular: Negative for chest pain and palpitations.   Gastrointestinal: Positive for nausea. Negative for abdominal pain and anal bleeding.   Endocrine: Negative for cold intolerance and polydipsia.   Genitourinary: Negative for dysuria and hematuria.   Musculoskeletal: Negative for joint swelling and myalgias.   Skin: Negative for color change and rash.   Neurological: Positive for dizziness and numbness. Negative for tremors.   Psychiatric/Behavioral: Negative for hallucinations and self-injury.     Objective:   Vitals: Blood pressure (!) 227/99, pulse 72, temperature 97.7 °F (36.5 °C), temperature source Oral, resp. rate 16, height 5' 5" (1.651 m), weight 72.6 kg (160 lb), SpO2 97 %.     CT READ: Yes  No hemmorhage. No mass effect. No early infarct signs.     Physical Exam  Constitutional:       General: He is not in acute distress.     Appearance: He is well-nourished.   HENT:      Head: Atraumatic.      Right Ear: External ear normal.      Left Ear: External ear " normal.   Eyes:      General: No scleral icterus.     Conjunctiva/sclera: Conjunctivae normal.   Pulmonary:      Effort: Pulmonary effort is normal.   Abdominal:      General: There is no distension.      Tenderness: There is no guarding.   Musculoskeletal:         General: No deformity. Normal range of motion.      Cervical back: Normal range of motion.   Skin:     General: Skin is warm and dry.   Neurological:      Mental Status: He is alert.   Psychiatric:         Mood and Affect: Mood and affect normal.

## 2022-10-20 NOTE — ED NOTES
Neurologist and ED MD at bedside for assessment via telestroke computer. Per neurologist, consider hypertensive emergency due to pt's elevated BP, start cardene. TPA not recommended.

## 2022-10-20 NOTE — CONSULTS
"      [facility]  Vascular Neurology  Comprehensive Stroke Center  TeleVascular Neurology Acute Consultation Note      Consult to Telemedicine - Acute Stroke  Consult performed by: Carlos Waite MD  Consult ordered by: Flakito Serna DO        Consulting Provider: FLAKITO SERNA  Current Providers  No providers found    Patient Location:  [facility] EMERGENCY DEPARTMENT Emergency Department  Spoke hospital nurse at bedside with patient assisting consultant.     Patient information was obtained from patient.         Assessment/Plan:       Diagnoses:   Hypertensive emergency  Subjective worsening weakness on left side from baseline, confusion, nausea, etc, BP last check was 227/117 mmHg  At this time favoring hypertensive emergency with recrudescence as etiology for current neurological complaints.  Plan on BP control w/ cardene to goal of 160-180 mmHg systolic and if tolerated can continue to lower to normal goal.  If symptoms worsen with reduced BP, would pursue stroke workup and obtain CTA H&N in ED for further evaluation.        STROKE DOCUMENTATION     Acute Stroke Times:   Acute Stroke Times   Last Known Normal Time: 1030  Symptom Onset Date: 10/20/22  Symptom Onset Time: 1200  Stroke Team Called Time: 1619  Stroke Team Arrival Time: 1624  CT Interpretation Time: 1624    NIH Scale:        Modified Deysi    Rafaela Coma Scale:    ABCD2 Score:    [id number] Score:   HAS -BLED Score:   ICH Score:   Hunt & Urbina Classification:       Blood pressure (!) 227/99, pulse 72, temperature 97.7 °F (36.5 °C), temperature source Oral, resp. rate 16, height 5' 5" (1.651 m), weight 72.6 kg (160 lb), SpO2 97 %.  Alteplase Eligible?: No  Alteplase Recommendation: Alteplase not recommended due to Outside of treatment window   Possible Interventional Revascularization Candidate? No; at this time symptoms not suggestive of large vessel occlusion    Disposition Recommendation: admit to " "inpatient    Subjective:     History of Present Illness:  82M w/ left sided weakness worsening from baseline, nausea vomitting and confusion.  /99 mmHg      Woke up with symptoms?: no    Recent bleeding noted: no  Does the patient take any Blood Thinners? no  Medications: No relevant medications      Past Medical History:   Past Medical History:   Diagnosis Date    Chronic kidney disease     Fibromyalgia     Fibromyalgia     HSV (herpes simplex virus) anogenital infection     Hypertension     does not take medication    Mental disorder     history of depression       Past Surgical History: no major surgeries within the last 2 weeks    Family History: no relevant history    Social History: no smoking, no drinking, no drugs    Allergies: Cymbalta [Duloxetine]  Iodine And Iodide Containing Products  Latex, Natural Rubber  Asa [Aspirin] No relevant allergies    Review of Systems   Constitutional: Negative for appetite change, chills and fever.   HENT: Negative for congestion and sore throat.    Eyes: Negative for discharge and itching.   Respiratory: Negative for apnea and shortness of breath.    Cardiovascular: Negative for chest pain and palpitations.   Gastrointestinal: Positive for nausea. Negative for abdominal pain and anal bleeding.   Endocrine: Negative for cold intolerance and polydipsia.   Genitourinary: Negative for dysuria and hematuria.   Musculoskeletal: Negative for joint swelling and myalgias.   Skin: Negative for color change and rash.   Neurological: Positive for dizziness and numbness. Negative for tremors.   Psychiatric/Behavioral: Negative for hallucinations and self-injury.     Objective:   Vitals: Blood pressure (!) 227/99, pulse 72, temperature 97.7 °F (36.5 °C), temperature source Oral, resp. rate 16, height 5' 5" (1.651 m), weight 72.6 kg (160 lb), SpO2 97 %.     CT READ: Yes  No hemmorhage. No mass effect. No early infarct signs.     Physical Exam  Constitutional:       General: He is " not in acute distress.     Appearance: He is well-nourished.   HENT:      Head: Atraumatic.      Right Ear: External ear normal.      Left Ear: External ear normal.   Eyes:      General: No scleral icterus.     Conjunctiva/sclera: Conjunctivae normal.   Pulmonary:      Effort: Pulmonary effort is normal.   Abdominal:      General: There is no distension.      Tenderness: There is no guarding.   Musculoskeletal:         General: No deformity. Normal range of motion.      Cervical back: Normal range of motion.   Skin:     General: Skin is warm and dry.   Neurological:      Mental Status: He is alert.   Psychiatric:         Mood and Affect: Mood and affect normal.             Recommended the emergency room physician to have a brief discussion with the patient and/or family if available regarding the  risks and benefits of treatment, and to briefly document the occurrence of that discussion in his clinical encounter note.     The attending portion of this evaluation, treatment, and documentation was performed per Carlos Waite MD via audiovisual.    Billing code:  (time dependent stroke, complex case, unstable patient, hemorrhages, any intervention, some mimics)      There is a very high probability for acute neurological change leading to clinical and possibly life-threatening deterioration requiring highest level of physician preparedness for urgent intervention.  There is possibility that this condition will require treatment with high risk medications as quickly as possible.  There is also a possibility that the patient may benefit from further, more advance complex therapies (e.g. endovascular therapy) that will require prompt diagnosis and care.  Care was coordinated with other physicians involved in the patient's care.  Radiologic studies and laboratory data were reviewed and interpreted, and plan of care was re-assessed based on the results.  Diagnosis, treatment options and prognosis may have been  discussed with the patient and/or family members or caregiver.  Further advanced medical management and further evaluation is warranted for his care.    In your opinion, this was a: Tier 2 Van Negative    Consult End Time: 4:34 PM     Carlos Waite MD  [facility] Stroke Center  Vascular Neurology   [facility]

## 2022-10-20 NOTE — ASSESSMENT & PLAN NOTE
Subjective worsening weakness on left side from baseline, confusion, nausea, etc, BP last check was 227/117 mmHg  At this time favoring hypertensive emergency with recrudescence as etiology for current neurological complaints.  Plan on BP control w/ cardene to goal of 160-180 mmHg systolic and if tolerated can continue to lower to normal goal.  If symptoms worsen with reduced BP, would pursue stroke workup and obtain CTA H&N in ED for further evaluation.

## 2022-10-20 NOTE — ED TRIAGE NOTES
Pt to the ED with complaints of increased left sided weakness. Pt has hx of stroke in April of this year and reports still having right sided weakness but feels that today the weakness is worse. Pt also reporting confusion, nausea and elevated BP starting this afternoon. Pt's /99, denies taking prescribed amlodipine today. Pt denies headache, vision changes, trouble speaking, slurred speech, dizziness, chest pain, shortness of breath, or any other symptoms. Dr. Warner at bedside, activated stroke alert and pt brought to CT.

## 2022-10-20 NOTE — HPI
82M w/ consult yesterday for left sided weakness worsening from baseline, nausea vomitting and confusion.  /99 mmHg, felt to be more related to HTN. Was admitted and started on BP control and was also noted to be in a.fib w/ RVR.    Placed on heparin gtt w/ a.fib w/ RVR (last PTT 61) , was neurological back to normal all morning and conversational and normal until 130, started repeating himself, confusion, not able to hold a conversation. Converted over to sinus rhythm during this timeline as well

## 2022-10-20 NOTE — ED PROVIDER NOTES
Encounter Date: 10/20/2022    SCRIBE #1 NOTE: I, Janna Yuan, am scribing for, and in the presence of,  Nimo Warner DO. I have scribed the following portions of the note - Other sections scribed: HPI; ROS; PE.     History     Chief Complaint   Patient presents with    Weakness     83 yo male to triage for increased left sided weakness, confusion, nausea, and elevated BP around noon. Pt says she already had weakness but it increased this afternoon. /99 while being triaged, CBG-118. Pt has no facial droop or slurred speech noted. Pt was Stroke Activated by Dr. Warner @ 1291.     Hypertension     Kierra Daugherty is a 82 y.o. male with Hx of CKD and HTN and CVA who presents to the ED for chief complaint of worsening left sided weakness and numbness onset today between 10 30 and 11. Patient reports associated confusion and nausea.  Patient reports history of a stroke that he has ongoing left-sided weakness and numbness but he feels it is worse today.  Patient has not taken anything for symptoms.  Patient reports symptoms came on suddenly today.  No further complaints at this time.       The history is provided by the patient. No  was used.   Review of patient's allergies indicates:   Allergen Reactions    Cymbalta [duloxetine] Other (See Comments)     Kidney failure    Iodine and iodide containing products Other (See Comments)     Chest, bladder pain    Latex, natural rubber Other (See Comments)     unknown    Asa [aspirin]      No rash, but feels bad when he takes it     Past Medical History:   Diagnosis Date    Chronic kidney disease     Fibromyalgia     Fibromyalgia     HSV (herpes simplex virus) anogenital infection     Hypertension     does not take medication    Mental disorder     history of depression     Past Surgical History:   Procedure Laterality Date    APPENDECTOMY      Collapse Lung  1969    GUM SURGERY      HERNIA REPAIR      Left Shoulder      tonsilect      TONSILLECTOMY,  ADENOIDECTOMY       Family History   Problem Relation Age of Onset    Stroke Mother     Heart disease Mother     Cancer Father     Liver disease Sister     Neuropathy Sister     Heart disease Brother     Cancer Brother      Social History     Tobacco Use    Smoking status: Former     Types: Cigarettes     Start date: 1952     Quit date: 4/10/1983     Years since quittin.5    Smokeless tobacco: Never   Substance Use Topics    Alcohol use: No     Comment: Heavy at one time.    Drug use: No     Review of Systems   Constitutional:  Negative for fever.   HENT:  Negative for sore throat.    Eyes:  Negative for pain.   Respiratory:  Negative for shortness of breath.    Cardiovascular:  Negative for chest pain.   Gastrointestinal:  Positive for nausea. Negative for abdominal pain.   Genitourinary:  Negative for dysuria.   Musculoskeletal:  Negative for back pain.   Skin:  Negative for rash.   Neurological:  Positive for weakness and numbness. Negative for headaches.   Psychiatric/Behavioral:  Positive for confusion.    All other systems reviewed and are negative.    Physical Exam     Initial Vitals [10/20/22 1610]   BP Pulse Resp Temp SpO2   (!) 227/99 72 16 97.7 °F (36.5 °C) 97 %      MAP       --         Patient gave consent to have physical exam performed.    Physical Exam    Nursing note and vitals reviewed.  Constitutional: He appears well-developed and well-nourished.   HENT:   Head: Normocephalic and atraumatic.   Right Ear: External ear normal.   Left Ear: External ear normal.   Mouth/Throat: Oropharynx is clear and moist.   Eyes: Conjunctivae and EOM are normal. Pupils are equal, round, and reactive to light.   Neck: Neck supple. JVD (bilateral) present.   Normal range of motion.  Cardiovascular:  Normal rate, regular rhythm, normal heart sounds and intact distal pulses.     Exam reveals no gallop and no friction rub.       No murmur heard.  Pulmonary/Chest: Breath sounds normal. No respiratory distress.  He has no wheezes. He has no rhonchi. He has no rales.   Abdominal: Abdomen is soft. Bowel sounds are normal. He exhibits no distension. There is no abdominal tenderness. There is no rebound and no guarding.   Musculoskeletal:         General: No tenderness or edema. Normal range of motion.      Cervical back: Normal range of motion and neck supple.     Neurological: He is alert and oriented to person, place, and time. A sensory deficit is present.   Decreased sensation to the left side. Left  is less than on the right. Left arm drift on exam. Able to move all 4 extremities.    Skin: Skin is warm and dry.   Psychiatric: He has a normal mood and affect. His behavior is normal.       ED Course   Critical Care    Date/Time: 10/20/2022 5:17 PM  Performed by: Nimo Warner DO  Authorized by: Nimo Warner DO   Direct patient critical care time: 8 minutes  Additional history critical care time: 8 minutes  Ordering / reviewing critical care time: 8 minutes  Documentation critical care time: 8 minutes  Total critical care time (exclusive of procedural time) : 32 minutes  Critical care was necessary to treat or prevent imminent or life-threatening deterioration of the following conditions: CNS failure or compromise.      Labs Reviewed   CBC W/ AUTO DIFFERENTIAL - Abnormal; Notable for the following components:       Result Value    RBC 4.46 (*)     MCH 31.8 (*)     MPV 8.8 (*)     All other components within normal limits   COMPREHENSIVE METABOLIC PANEL - Abnormal; Notable for the following components:    CO2 21 (*)     BUN 27 (*)     All other components within normal limits   LIPID PANEL - Abnormal; Notable for the following components:    Cholesterol 231 (*)     Triglycerides 470 (*)     HDL 33 (*)     HDL/Cholesterol Ratio 14.3 (*)     Total Cholesterol/HDL Ratio 7.0 (*)     All other components within normal limits   URINALYSIS, REFLEX TO URINE CULTURE - Abnormal; Notable for the following components:    Color, UA  Colorless (*)     All other components within normal limits    Narrative:     Specimen Source->Urine   ISTAT PROCEDURE - Abnormal; Notable for the following components:    POC PH 7.344 (*)     POC PCO2 51.8 (*)     POC PO2 28 (*)     POC HCO3 28.2 (*)     POC SATURATED O2 47 (*)     POC TCO2 30 (*)     All other components within normal limits   PROTIME-INR   TSH   TROPONIN I   B-TYPE NATRIURETIC PEPTIDE   APTT   SARS-COV-2 RDRP GENE   POCT GLUCOSE, HAND-HELD DEVICE   ISTAT PROCEDURE   ISTAT CHEM8   POCT PROTIME-INR   POCT GLUCOSE MONITORING CONTINUOUS     EKG Readings: (Independently Interpreted)   No STEMI. Rate of 68. Normal Sinus Rhythm. Normal Axis. Normal EKG. QTc normal at 377. When compared to prior EKG dated 05/18/21 rate remains the same at 68 bpm.        Imaging Results              X-Ray Chest AP Portable (Final result)  Result time 10/20/22 16:55:59      Final result by Berny Ramirez IV, MD (10/20/22 16:55:59)                   Impression:      As above.      Electronically signed by: Berny Ramirez  Date:    10/20/2022  Time:    16:55               Narrative:    EXAMINATION:  XR CHEST AP PORTABLE    CLINICAL HISTORY:  Stroke;    TECHNIQUE:  Single frontal view of the chest was performed.    COMPARISON:  Chest radiograph from 07/11/2013.    FINDINGS:  Mediastinal structures are midline.  Mediastinal and hilar contours appear within normal limits.  Calcific atherosclerosis at the aortic arch.  Small size cardiac silhouette.    Lungs are symmetrically expanded.  Probable biapical scarring.  Mildly coarsened interstitial attenuation.  No focal consolidation.  No pneumothorax.  No significant pleural fluid.    Remote right 6th rib deformity.  Stable hyperdensities overlying the left axilla.                                       CT Head Without Contrast (Final result)  Result time 10/20/22 16:40:00      Final result by Eron Junior MD (10/20/22 16:40:00)                   Impression:      1. No  acute intracranial process.  2. Involutional changes with chronic microvascular ischemic changes with small probable remote lacunar infarcts involving the right lateral basal ganglia, bilateral centrum semiovale and left thalamus.  See above comments.      Electronically signed by: Eron Noonan  Date:    10/20/2022  Time:    16:40               Narrative:    EXAMINATION:  CT HEAD WITHOUT CONTRAST    CLINICAL HISTORY:  Neuro deficit, acute, stroke suspected;    TECHNIQUE:  Low dose axial CT images obtained throughout the head without intravenous contrast. Sagittal and coronal reconstructions were performed.    COMPARISON:  05/18/2021    FINDINGS:  Intracranial compartment:    No midline shift or hydrocephalus.  No extra-axial blood or fluid collections.    Moderate involutional changes with chronic microvascular ischemic changes in the periventricular and subcortical white matter.    Bilateral remote lacunar infarcts involving the right lateral basal ganglia, bilateral centrum semiovale, left thalamus.  MRI follow-up may better characterize if there is high clinical suspicion.  No parenchymal mass, hemorrhage, edema or major vascular distribution infarct.    Skull/extracranial contents (limited evaluation): No fracture. Mastoid air cells and paranasal sinuses are essentially clear.                                       Medications   sodium chloride 0.9% flush 10 mL (has no administration in time range)   acetaminophen tablet 650 mg (has no administration in time range)   HYDROcodone-acetaminophen 5-325 mg per tablet 1 tablet (has no administration in time range)   HYDROcodone-acetaminophen  mg per tablet 1 tablet (has no administration in time range)   famotidine (PF) injection 20 mg (20 mg Intravenous Given 10/20/22 2159)   ondansetron injection 4 mg (has no administration in time range)   prochlorperazine injection Soln 5 mg (has no administration in time range)   melatonin tablet 6 mg (has no  administration in time range)   potassium chloride 10 mEq in 100 mL IVPB (has no administration in time range)     And   potassium chloride 10 mEq in 100 mL IVPB (has no administration in time range)     And   potassium chloride 10 mEq in 100 mL IVPB (has no administration in time range)   magnesium sulfate 2g in water 50mL IVPB (premix) (has no administration in time range)   magnesium sulfate 2g in water 50mL IVPB (premix) (has no administration in time range)   sodium phosphate 15 mmol in dextrose 5 % 250 mL IVPB (has no administration in time range)   sodium phosphate 20.01 mmol in dextrose 5 % 250 mL IVPB (has no administration in time range)   sodium phosphate 30 mmol in dextrose 5 % 250 mL IVPB (has no administration in time range)   calcium gluconate 1 g in NS IVPB (premixed) (has no administration in time range)   calcium gluconate 1 g in NS IVPB (premixed) (has no administration in time range)   calcium gluconate 1 g in NS IVPB (premixed) (has no administration in time range)   albuterol-ipratropium 2.5 mg-0.5 mg/3 mL nebulizer solution 3 mL (has no administration in time range)   heparin (porcine) injection 5,000 Units (has no administration in time range)   atorvastatin tablet 40 mg (has no administration in time range)   diphenhydrAMINE capsule 25 mg (has no administration in time range)   niCARdipine 40 mg/200 mL (0.2 mg/mL) infusion (5 mg/hr Intravenous New Bag 10/20/22 2209)   ondansetron injection 8 mg (8 mg Intravenous Given 10/20/22 1656)   famotidine (PF) injection 40 mg (40 mg Intravenous Given 10/20/22 1656)     Medical Decision Making:   History:   Old Medical Records: I decided to obtain old medical records.  Independently Interpreted Test(s):   I have ordered and independently interpreted X-rays - see prior notes.  I have ordered and independently interpreted EKG Reading(s) - see prior notes  Clinical Tests:   Lab Tests: Ordered and Reviewed  Radiological Study: Ordered and Reviewed  Medical  Tests: Ordered and Reviewed  Additional MDM:   Stroke: Stroke Code: The patient was felt to be a stroke code and the stroke team was consulted. A telestroke evaluation was done by neurology.      NIH Stroke Scale:   Interval = baseline (upon arrival/admit)  Level of consciousness = 0 - alert  LOC questions = 0 - answers both correctly  LOC commands = 0 - performs both correctly  Best gaze = 0 - normal  Visual = 0 - no visual loss  Facial palsy = 1 - minor  Motor left arm =  1 - drift  Motor right arm =  0 - no drift  Motor left leg = 0 - no drift  Motor right leg =  0 - no drift  Limb ataxia = 0 - absent  Sensory = 1 - mild to moderate loss  Best language = 0 - no aphasia  Dysarthria = 0 - normal articulation  Extinction and inattention = 0 - no neglect  NIH Stroke Scale Total = 3     Chief complaint: Increased left sided weakness and numbness.   Differential diagnosis:CVA, Intracranial Hemorrhage, Hypoglycemia, HTN, Uncontrolled HTN    Treatment in the ED: PE,   famotidine (PF) injection 40 mg     ondansetron injection 8 mg      niCARdipine 40 mg/200 mL (0.2 mg/mL) infusion         Medical decision making   Chief Complaint   Patient presents with    Weakness     81 yo male to triage for increased left sided weakness, confusion, nausea, and elevated BP around noon. Pt says she already had weakness but it increased this afternoon. /99 while being triaged, CBG-118. Pt has no facial droop or slurred speech noted. Pt was Stroke Activated by Dr. Warner @ 5960.     Hypertension       Medications   sodium chloride 0.9% flush 10 mL (has no administration in time range)   acetaminophen tablet 650 mg (has no administration in time range)   HYDROcodone-acetaminophen 5-325 mg per tablet 1 tablet (has no administration in time range)   HYDROcodone-acetaminophen  mg per tablet 1 tablet (has no administration in time range)   famotidine (PF) injection 20 mg (20 mg Intravenous Given 10/20/22 2159)   ondansetron  injection 4 mg (has no administration in time range)   prochlorperazine injection Soln 5 mg (has no administration in time range)   melatonin tablet 6 mg (has no administration in time range)   potassium chloride 10 mEq in 100 mL IVPB (has no administration in time range)     And   potassium chloride 10 mEq in 100 mL IVPB (has no administration in time range)     And   potassium chloride 10 mEq in 100 mL IVPB (has no administration in time range)   magnesium sulfate 2g in water 50mL IVPB (premix) (has no administration in time range)   magnesium sulfate 2g in water 50mL IVPB (premix) (has no administration in time range)   sodium phosphate 15 mmol in dextrose 5 % 250 mL IVPB (has no administration in time range)   sodium phosphate 20.01 mmol in dextrose 5 % 250 mL IVPB (has no administration in time range)   sodium phosphate 30 mmol in dextrose 5 % 250 mL IVPB (has no administration in time range)   calcium gluconate 1 g in NS IVPB (premixed) (has no administration in time range)   calcium gluconate 1 g in NS IVPB (premixed) (has no administration in time range)   calcium gluconate 1 g in NS IVPB (premixed) (has no administration in time range)   albuterol-ipratropium 2.5 mg-0.5 mg/3 mL nebulizer solution 3 mL (has no administration in time range)   heparin (porcine) injection 5,000 Units (has no administration in time range)   atorvastatin tablet 40 mg (has no administration in time range)   diphenhydrAMINE capsule 25 mg (has no administration in time range)   niCARdipine 40 mg/200 mL (0.2 mg/mL) infusion (5 mg/hr Intravenous New Bag 10/20/22 2209)   ondansetron injection 8 mg (8 mg Intravenous Given 10/20/22 1656)   famotidine (PF) injection 40 mg (40 mg Intravenous Given 10/20/22 1656)     Code stroke completed.  Consult with Dr. Waite.  At this time no further workup indicated.  It just manage blood pressure.  Blood pressure improved symptoms have improved at this time.   Patient reports feeling better after  treatment in the ER.    Discussed diagnosis, labs, and imaging results with the patient.      Patient is agreeable to transfer & admission at [facility].    I spoke with utilization ManagementTanja, at 7:12 PM.   Requesting consultation with Hospital Medicine for services not available at this facility.   Discussed patient's presentation, past medical history, physical exam, labs, radiology results, vital signs, and ED course.  Consultation with DR Chacko who is on call for Dr. Cardona for  admission at [facility].  At this time patient will be admitted.       Scribe Attestation:   Scribe #1: I performed the above scribed service and the documentation accurately describes the services I performed. I attest to the accuracy of the note.             I, Dr. Nimo Warner, personally performed the services described in this documentation. This document was produced by a scribe under my direction and in my presence. All medical record entries made by the scribe were at my direction and in my presence.  I have reviewed the chart and agree that the record reflects my personal performance and is accurate and complete. Nimo Warner DO.     10/20/2022 11:10 PM         Clinical Impression:   Final diagnoses:  [I63.9] Stroke  [I16.1] Hypertensive emergency        ED Disposition Condition    Admit                 Nimo Warner DO  10/20/22 7214

## 2022-10-21 PROBLEM — R45.851 SUICIDAL IDEATION: Status: ACTIVE | Noted: 2022-10-21

## 2022-10-21 PROBLEM — R41.82 ALTERED MENTAL STATUS: Status: ACTIVE | Noted: 2022-10-21

## 2022-10-21 PROBLEM — I48.91 NEW ONSET ATRIAL FIBRILLATION: Status: ACTIVE | Noted: 2022-10-21

## 2022-10-21 PROBLEM — R47.01 APHASIA: Status: ACTIVE | Noted: 2022-10-21

## 2022-10-21 LAB
ALLENS TEST: ABNORMAL
ANION GAP SERPL CALC-SCNC: 9 MMOL/L (ref 8–16)
AORTIC ROOT ANNULUS: 1.3 CM
APTT BLDCRRT: 25.6 SEC (ref 21–32)
APTT BLDCRRT: 61.1 SEC (ref 21–32)
AV INDEX (PROSTH): 0.84
AV MEAN GRADIENT: 4 MMHG
AV PEAK GRADIENT: 7 MMHG
AV VALVE AREA: 1.16 CM2
AV VELOCITY RATIO: 0.65
BASOPHILS # BLD AUTO: 0.04 K/UL (ref 0–0.2)
BASOPHILS # BLD AUTO: 0.05 K/UL (ref 0–0.2)
BASOPHILS NFR BLD: 0.5 % (ref 0–1.9)
BASOPHILS NFR BLD: 0.6 % (ref 0–1.9)
BSA FOR ECHO PROCEDURE: 1.88 M2
BUN SERPL-MCNC: 20 MG/DL (ref 8–23)
CALCIUM SERPL-MCNC: 8.8 MG/DL (ref 8.7–10.5)
CHLORIDE SERPL-SCNC: 108 MMOL/L (ref 95–110)
CO2 SERPL-SCNC: 23 MMOL/L (ref 23–29)
CREAT SERPL-MCNC: 1 MG/DL (ref 0.5–1.4)
CV ECHO LV RWT: 0.46 CM
DIFFERENTIAL METHOD: ABNORMAL
DIFFERENTIAL METHOD: ABNORMAL
DOP CALC AO PEAK VEL: 1.31 M/S
DOP CALC AO VTI: 21.1 CM
DOP CALC LVOT AREA: 1.4 CM2
DOP CALC LVOT DIAMETER: 1.33 CM
DOP CALC LVOT PEAK VEL: 0.85 M/S
DOP CALC LVOT STROKE VOLUME: 24.58 CM3
DOP CALCLVOT PEAK VEL VTI: 17.7 CM
E WAVE DECELERATION TIME: 168.63 MSEC
E/A RATIO: 2.31
ECHO LV POSTERIOR WALL: 0.86 CM (ref 0.6–1.1)
EJECTION FRACTION: 55 %
EOSINOPHIL # BLD AUTO: 0.1 K/UL (ref 0–0.5)
EOSINOPHIL # BLD AUTO: 0.1 K/UL (ref 0–0.5)
EOSINOPHIL NFR BLD: 0.6 % (ref 0–8)
EOSINOPHIL NFR BLD: 1 % (ref 0–8)
ERYTHROCYTE [DISTWIDTH] IN BLOOD BY AUTOMATED COUNT: 12.8 % (ref 11.5–14.5)
ERYTHROCYTE [DISTWIDTH] IN BLOOD BY AUTOMATED COUNT: 12.8 % (ref 11.5–14.5)
EST. GFR  (NO RACE VARIABLE): >60 ML/MIN/1.73 M^2
FRACTIONAL SHORTENING: 16 % (ref 28–44)
GLUCOSE SERPL-MCNC: 125 MG/DL (ref 70–110)
HCO3 UR-SCNC: 25.2 MMOL/L (ref 24–28)
HCT VFR BLD AUTO: 41.2 % (ref 40–54)
HCT VFR BLD AUTO: 43.9 % (ref 40–54)
HGB BLD-MCNC: 14.2 G/DL (ref 14–18)
HGB BLD-MCNC: 15 G/DL (ref 14–18)
IMM GRANULOCYTES # BLD AUTO: 0.03 K/UL (ref 0–0.04)
IMM GRANULOCYTES # BLD AUTO: 0.04 K/UL (ref 0–0.04)
IMM GRANULOCYTES NFR BLD AUTO: 0.4 % (ref 0–0.5)
IMM GRANULOCYTES NFR BLD AUTO: 0.5 % (ref 0–0.5)
INR PPP: 1 (ref 0.8–1.2)
INTERVENTRICULAR SEPTUM: 0.94 CM (ref 0.6–1.1)
LA MINOR: 3.55 CM
LA WIDTH: 3.6 CM
LEFT ATRIUM SIZE: 4.6 CM
LEFT INTERNAL DIMENSION IN SYSTOLE: 3.19 CM (ref 2.1–4)
LEFT VENTRICLE DIASTOLIC VOLUME INDEX: 32.52 ML/M2
LEFT VENTRICLE DIASTOLIC VOLUME: 61.13 ML
LEFT VENTRICLE MASS INDEX: 53 G/M2
LEFT VENTRICLE SYSTOLIC VOLUME INDEX: 21.6 ML/M2
LEFT VENTRICLE SYSTOLIC VOLUME: 40.55 ML
LEFT VENTRICULAR INTERNAL DIMENSION IN DIASTOLE: 3.78 CM (ref 3.5–6)
LEFT VENTRICULAR MASS: 100.22 G
LVOT MG: 2.24 MMHG
LVOT MV: 0.73 CM/S
LYMPHOCYTES # BLD AUTO: 1.1 K/UL (ref 1–4.8)
LYMPHOCYTES # BLD AUTO: 1.3 K/UL (ref 1–4.8)
LYMPHOCYTES NFR BLD: 12.8 % (ref 18–48)
LYMPHOCYTES NFR BLD: 16 % (ref 18–48)
MAGNESIUM SERPL-MCNC: 2 MG/DL (ref 1.6–2.6)
MCH RBC QN AUTO: 31.1 PG (ref 27–31)
MCH RBC QN AUTO: 31.6 PG (ref 27–31)
MCHC RBC AUTO-ENTMCNC: 34.2 G/DL (ref 32–36)
MCHC RBC AUTO-ENTMCNC: 34.5 G/DL (ref 32–36)
MCV RBC AUTO: 91 FL (ref 82–98)
MCV RBC AUTO: 92 FL (ref 82–98)
MONOCYTES # BLD AUTO: 0.3 K/UL (ref 0.3–1)
MONOCYTES # BLD AUTO: 0.4 K/UL (ref 0.3–1)
MONOCYTES NFR BLD: 4 % (ref 4–15)
MONOCYTES NFR BLD: 5 % (ref 4–15)
MV PEAK A VEL: 0.42 M/S
MV PEAK E VEL: 0.97 M/S
MV STENOSIS PRESSURE HALF TIME: 48.9 MS
MV VALVE AREA P 1/2 METHOD: 4.5 CM2
NEUTROPHILS # BLD AUTO: 6.5 K/UL (ref 1.8–7.7)
NEUTROPHILS # BLD AUTO: 6.6 K/UL (ref 1.8–7.7)
NEUTROPHILS NFR BLD: 78.5 % (ref 38–73)
NEUTROPHILS NFR BLD: 80.1 % (ref 38–73)
NRBC BLD-RTO: 0 /100 WBC
NRBC BLD-RTO: 0 /100 WBC
PCO2 BLDA: 41.8 MMHG (ref 35–45)
PH SMN: 7.39 [PH] (ref 7.35–7.45)
PHOSPHATE SERPL-MCNC: 2.5 MG/DL (ref 2.7–4.5)
PISA TR MAX VEL: 2.41 M/S
PLATELET # BLD AUTO: 180 K/UL (ref 150–450)
PLATELET # BLD AUTO: 181 K/UL (ref 150–450)
PMV BLD AUTO: 8.5 FL (ref 9.2–12.9)
PMV BLD AUTO: 8.7 FL (ref 9.2–12.9)
PO2 BLDA: 55 MMHG (ref 40–60)
POC BE: 0 MMOL/L
POC SATURATED O2: 88 % (ref 95–100)
POC TCO2: 26 MMOL/L (ref 24–29)
POCT GLUCOSE: 133 MG/DL (ref 70–110)
POTASSIUM SERPL-SCNC: 4.1 MMOL/L (ref 3.5–5.1)
PROTHROMBIN TIME: 10.3 SEC (ref 9–12.5)
PV PEAK VELOCITY: 0.68 CM/S
RA MAJOR: 5.37 CM
RA PRESSURE: 3 MMHG
RA WIDTH: 2.52 CM
RBC # BLD AUTO: 4.49 M/UL (ref 4.6–6.2)
RBC # BLD AUTO: 4.82 M/UL (ref 4.6–6.2)
RIGHT VENTRICULAR END-DIASTOLIC DIMENSION: 2.63 CM
SAMPLE: ABNORMAL
SINUS: 3.13 CM
SITE: ABNORMAL
SODIUM SERPL-SCNC: 140 MMOL/L (ref 136–145)
STJ: 1.92 CM
TR MAX PG: 23 MMHG
TRICUSPID ANNULAR PLANE SYSTOLIC EXCURSION: 1.16 CM
TV REST PULMONARY ARTERY PRESSURE: 26 MMHG
WBC # BLD AUTO: 8.21 K/UL (ref 3.9–12.7)
WBC # BLD AUTO: 8.26 K/UL (ref 3.9–12.7)

## 2022-10-21 PROCEDURE — 25000003 PHARM REV CODE 250: Performed by: STUDENT IN AN ORGANIZED HEALTH CARE EDUCATION/TRAINING PROGRAM

## 2022-10-21 PROCEDURE — G0508 PR CRITICAL CARE TELEHLTH INITIAL CONSULT 60MIN: ICD-10-PCS | Mod: 95,,, | Performed by: PSYCHIATRY & NEUROLOGY

## 2022-10-21 PROCEDURE — 93005 ELECTROCARDIOGRAM TRACING: CPT

## 2022-10-21 PROCEDURE — 85610 PROTHROMBIN TIME: CPT | Performed by: STUDENT IN AN ORGANIZED HEALTH CARE EDUCATION/TRAINING PROGRAM

## 2022-10-21 PROCEDURE — 63600175 PHARM REV CODE 636 W HCPCS: Performed by: STUDENT IN AN ORGANIZED HEALTH CARE EDUCATION/TRAINING PROGRAM

## 2022-10-21 PROCEDURE — G0508 CRIT CARE TELEHEA CONSULT 60: HCPCS | Mod: 95,,, | Performed by: PSYCHIATRY & NEUROLOGY

## 2022-10-21 PROCEDURE — 80048 BASIC METABOLIC PNL TOTAL CA: CPT | Performed by: STUDENT IN AN ORGANIZED HEALTH CARE EDUCATION/TRAINING PROGRAM

## 2022-10-21 PROCEDURE — 94761 N-INVAS EAR/PLS OXIMETRY MLT: CPT

## 2022-10-21 PROCEDURE — 25500020 PHARM REV CODE 255: Performed by: HOSPITALIST

## 2022-10-21 PROCEDURE — 84100 ASSAY OF PHOSPHORUS: CPT | Performed by: STUDENT IN AN ORGANIZED HEALTH CARE EDUCATION/TRAINING PROGRAM

## 2022-10-21 PROCEDURE — 36415 COLL VENOUS BLD VENIPUNCTURE: CPT | Performed by: STUDENT IN AN ORGANIZED HEALTH CARE EDUCATION/TRAINING PROGRAM

## 2022-10-21 PROCEDURE — 85730 THROMBOPLASTIN TIME PARTIAL: CPT | Mod: 91 | Performed by: HOSPITALIST

## 2022-10-21 PROCEDURE — 99291 CRITICAL CARE FIRST HOUR: CPT | Mod: 95,GC,, | Performed by: INTERNAL MEDICINE

## 2022-10-21 PROCEDURE — 20000000 HC ICU ROOM

## 2022-10-21 PROCEDURE — 90792 PR PSYCHIATRIC DIAGNOSTIC EVALUATION W/MEDICAL SERVICES: ICD-10-PCS | Mod: ,,, | Performed by: PSYCHIATRY & NEUROLOGY

## 2022-10-21 PROCEDURE — 90792 PSYCH DIAG EVAL W/MED SRVCS: CPT | Mod: ,,, | Performed by: PSYCHIATRY & NEUROLOGY

## 2022-10-21 PROCEDURE — 99900035 HC TECH TIME PER 15 MIN (STAT)

## 2022-10-21 PROCEDURE — 85025 COMPLETE CBC W/AUTO DIFF WBC: CPT | Mod: 91 | Performed by: STUDENT IN AN ORGANIZED HEALTH CARE EDUCATION/TRAINING PROGRAM

## 2022-10-21 PROCEDURE — 85730 THROMBOPLASTIN TIME PARTIAL: CPT | Performed by: STUDENT IN AN ORGANIZED HEALTH CARE EDUCATION/TRAINING PROGRAM

## 2022-10-21 PROCEDURE — 99291 PR CRITICAL CARE, E/M 30-74 MINUTES: ICD-10-PCS | Mod: 95,GC,, | Performed by: INTERNAL MEDICINE

## 2022-10-21 PROCEDURE — 82803 BLOOD GASES ANY COMBINATION: CPT

## 2022-10-21 PROCEDURE — 93010 ELECTROCARDIOGRAM REPORT: CPT | Mod: ,,, | Performed by: INTERNAL MEDICINE

## 2022-10-21 PROCEDURE — 83735 ASSAY OF MAGNESIUM: CPT | Performed by: STUDENT IN AN ORGANIZED HEALTH CARE EDUCATION/TRAINING PROGRAM

## 2022-10-21 PROCEDURE — 93010 EKG 12-LEAD: ICD-10-PCS | Mod: ,,, | Performed by: INTERNAL MEDICINE

## 2022-10-21 PROCEDURE — 25000003 PHARM REV CODE 250: Performed by: HOSPITALIST

## 2022-10-21 PROCEDURE — 36415 COLL VENOUS BLD VENIPUNCTURE: CPT | Performed by: HOSPITALIST

## 2022-10-21 PROCEDURE — 25000003 PHARM REV CODE 250: Performed by: INTERNAL MEDICINE

## 2022-10-21 RX ORDER — HEPARIN SODIUM,PORCINE/D5W 25000/250
0-40 INTRAVENOUS SOLUTION INTRAVENOUS CONTINUOUS
Status: DISCONTINUED | OUTPATIENT
Start: 2022-10-21 | End: 2022-10-21

## 2022-10-21 RX ORDER — METOPROLOL TARTRATE 50 MG/1
50 TABLET ORAL 2 TIMES DAILY
Status: DISCONTINUED | OUTPATIENT
Start: 2022-10-21 | End: 2022-10-21

## 2022-10-21 RX ORDER — BISMUTH SUBSALICYLATE 525 MG/30ML
30 LIQUID ORAL EVERY 6 HOURS PRN
Status: DISCONTINUED | OUTPATIENT
Start: 2022-10-21 | End: 2022-10-25 | Stop reason: HOSPADM

## 2022-10-21 RX ORDER — METOPROLOL SUCCINATE 50 MG/1
50 TABLET, EXTENDED RELEASE ORAL 2 TIMES DAILY
Status: DISCONTINUED | OUTPATIENT
Start: 2022-10-21 | End: 2022-10-22

## 2022-10-21 RX ORDER — HALOPERIDOL 5 MG/ML
2 INJECTION INTRAMUSCULAR EVERY 4 HOURS PRN
Status: DISCONTINUED | OUTPATIENT
Start: 2022-10-21 | End: 2022-10-25 | Stop reason: HOSPADM

## 2022-10-21 RX ORDER — CLOTRIMAZOLE AND BETAMETHASONE DIPROPIONATE 10; .64 MG/G; MG/G
CREAM TOPICAL 2 TIMES DAILY
Status: DISCONTINUED | OUTPATIENT
Start: 2022-10-21 | End: 2022-10-23

## 2022-10-21 RX ORDER — AMLODIPINE BESYLATE 5 MG/1
5 TABLET ORAL DAILY
Status: DISCONTINUED | OUTPATIENT
Start: 2022-10-21 | End: 2022-10-22

## 2022-10-21 RX ORDER — ONDANSETRON 2 MG/ML
8 INJECTION INTRAMUSCULAR; INTRAVENOUS EVERY 6 HOURS PRN
Status: DISCONTINUED | OUTPATIENT
Start: 2022-10-21 | End: 2022-10-25 | Stop reason: HOSPADM

## 2022-10-21 RX ORDER — MUPIROCIN 20 MG/G
OINTMENT TOPICAL 2 TIMES DAILY
Status: DISCONTINUED | OUTPATIENT
Start: 2022-10-21 | End: 2022-10-25 | Stop reason: HOSPADM

## 2022-10-21 RX ADMIN — METOPROLOL SUCCINATE 50 MG: 50 TABLET, FILM COATED, EXTENDED RELEASE ORAL at 10:10

## 2022-10-21 RX ADMIN — ATORVASTATIN CALCIUM 40 MG: 40 TABLET, FILM COATED ORAL at 08:10

## 2022-10-21 RX ADMIN — AMLODIPINE BESYLATE 5 MG: 5 TABLET ORAL at 01:10

## 2022-10-21 RX ADMIN — APIXABAN 5 MG: 5 TABLET, FILM COATED ORAL at 08:10

## 2022-10-21 RX ADMIN — IOHEXOL 100 ML: 350 INJECTION, SOLUTION INTRAVENOUS at 02:10

## 2022-10-21 RX ADMIN — METOPROLOL SUCCINATE 50 MG: 50 TABLET, FILM COATED, EXTENDED RELEASE ORAL at 08:10

## 2022-10-21 RX ADMIN — BISMUTH SUBSALICYLATE 30 ML: 525 LIQUID ORAL at 08:10

## 2022-10-21 RX ADMIN — Medication 6 MG: at 12:10

## 2022-10-21 RX ADMIN — PROCHLORPERAZINE EDISYLATE 5 MG: 5 INJECTION INTRAMUSCULAR; INTRAVENOUS at 10:10

## 2022-10-21 RX ADMIN — CLOTRIMAZOLE AND BETAMETHASONE DIPROPIONATE: 10; .5 CREAM TOPICAL at 11:10

## 2022-10-21 RX ADMIN — HEPARIN SODIUM AND DEXTROSE 12 UNITS/KG/HR: 10000; 5 INJECTION INTRAVENOUS at 05:10

## 2022-10-21 RX ADMIN — ONDANSETRON 4 MG: 2 INJECTION INTRAMUSCULAR; INTRAVENOUS at 07:10

## 2022-10-21 RX ADMIN — Medication 6 MG: at 08:10

## 2022-10-21 RX ADMIN — HYDROCODONE BITARTRATE AND ACETAMINOPHEN 1 TABLET: 10; 325 TABLET ORAL at 01:10

## 2022-10-21 RX ADMIN — MUPIROCIN: 20 OINTMENT TOPICAL at 11:10

## 2022-10-21 RX ADMIN — METOPROLOL TARTRATE 50 MG: 50 TABLET, FILM COATED ORAL at 04:10

## 2022-10-21 RX ADMIN — ATORVASTATIN CALCIUM 40 MG: 40 TABLET, FILM COATED ORAL at 12:10

## 2022-10-21 RX ADMIN — CLOTRIMAZOLE AND BETAMETHASONE DIPROPIONATE: 10; .5 CREAM TOPICAL at 08:10

## 2022-10-21 RX ADMIN — MUPIROCIN: 20 OINTMENT TOPICAL at 08:10

## 2022-10-21 NOTE — NURSING
[facility] [place]  Nurses Note -- 4 Eyes      10/21/2022       Skin assessed on: Q Shift      [x] No Pressure Injuries Present    [x]Prevention Measures Documented    [] Yes LDA  for Pressure Injury Previously documented     [] Yes New Pressure Injury Discovered   [] LDA for New Pressure Injury Added      Attending RN:  Briana Bansal RN     Second RN:  Jose Antonio Em RN

## 2022-10-21 NOTE — SUBJECTIVE & OBJECTIVE
Interval History: He has many complaints this morning. He complains of bilateral eye pain. He says he has a rash around his eyes (he does not) and is requesting eye drops but does not know the name. He says he previously had blisters all around his eyes and that affected his vision. He says they are still there but I cannot see them. He says he has a headache. He vomited after trying to eat this morning. He says he has a rash all over his body including on his penis.     Review of Systems   Constitutional:  Positive for activity change, appetite change and fatigue. Negative for chills and fever.   HENT:  Negative for congestion, mouth sores, postnasal drip, sinus pressure, sinus pain and trouble swallowing.    Eyes:  Positive for pain and visual disturbance.   Respiratory:  Positive for shortness of breath. Negative for cough and wheezing.    Cardiovascular:  Negative for chest pain, palpitations and leg swelling.   Gastrointestinal:  Positive for abdominal distention, nausea and vomiting. Negative for abdominal pain, constipation and diarrhea.   Genitourinary:  Negative for difficulty urinating.   Musculoskeletal:  Negative for arthralgias and myalgias.   Skin:  Positive for rash.   Neurological:  Negative for weakness and numbness.   Psychiatric/Behavioral:  Negative for confusion.    Objective:     Vital Signs (Most Recent):  Temp: 97.7 °F (36.5 °C) (10/21/22 0715)  Pulse: 93 (10/21/22 1030)  Resp: (!) 33 (10/21/22 1030)  BP: (!) 145/94 (10/21/22 1030)  SpO2: (!) 93 % (10/21/22 1030)   Vital Signs (24h Range):  Temp:  [97.7 °F (36.5 °C)-98.5 °F (36.9 °C)] 97.7 °F (36.5 °C)  Pulse:  [] 93  Resp:  [11-34] 33  SpO2:  [92 %-99 %] 93 %  BP: (106-238)/() 145/94     Weight: 72.4 kg (159 lb 9.8 oz)  Body mass index is 23.57 kg/m².    Intake/Output Summary (Last 24 hours) at 10/21/2022 1044  Last data filed at 10/21/2022 0736  Gross per 24 hour   Intake 220.67 ml   Output 700 ml   Net -479.33 ml      Physical  Exam  Vitals and nursing note reviewed.   Constitutional:       General: He is not in acute distress.     Appearance: He is not ill-appearing, toxic-appearing or diaphoretic.   HENT:      Head: Normocephalic and atraumatic.      Nose: Nose normal.      Mouth/Throat:      Mouth: Mucous membranes are moist.   Eyes:      General: No scleral icterus.        Right eye: No discharge.         Left eye: No discharge.      Extraocular Movements: Extraocular movements intact.      Conjunctiva/sclera: Conjunctivae normal.      Pupils: Pupils are equal, round, and reactive to light.      Comments: No rash present.    Cardiovascular:      Rate and Rhythm: Tachycardia present. Rhythm irregular.      Pulses: Normal pulses.      Heart sounds: Normal heart sounds. No murmur heard.    No gallop.      Comments: Afib on monitor  Pulmonary:      Effort: Pulmonary effort is normal. No respiratory distress.      Breath sounds: Normal breath sounds. No wheezing or rales.      Comments: Room air  Abdominal:      General: Bowel sounds are normal. There is distension.      Palpations: Abdomen is soft. There is no mass.      Tenderness: There is no abdominal tenderness. There is no guarding.   Genitourinary:     Comments: White substance under foreskin around glans penis. No ulcerations. No lesions.   Musculoskeletal:         General: No swelling or tenderness.      Right lower leg: No edema.      Left lower leg: No edema.   Skin:     General: Skin is warm and dry.      Coloration: Skin is not jaundiced.      Findings: No bruising, erythema, lesion or rash.   Neurological:      Mental Status: He is alert and oriented to person, place, and time. Mental status is at baseline.       Significant Labs: All pertinent labs within the past 24 hours have been reviewed.    Significant Imaging: I have reviewed all pertinent imaging results/findings within the past 24 hours.

## 2022-10-21 NOTE — EICU
eICU Physician Brief Note    Chart reviewed. On camera, the patient is resting in bed, in NAD.  Mr Daugherty is an 82 year old man presenting with worsening left sided weakness, confusion, nausea and high BP. He was not taking his home meds. In the ED, he was found to have hypertensive urgency and staretd on nicardipine gtt.   PMH: CKD, HTN, fibromyalgia, depression, chronic pain.  Labs and investigations reviewed.  Current medications reviewed.  A/P:  1. HTN emergency.  Titrate nicardipine to goal -180 in the first 24 hours (a decrease of 20-30% from highest value in ED).  Recommend changing outpatient regimen as  clonidine can cause significant rebound hypretension in noncompliant patients.  2. Left sided weakness, h/o CVA  Monitor neuro status.  Tele-stroke was consulted, do not suspect acute CVA.  3. Mild hypercapnia on VBG.  Could be related to hypoventilation from encephalopathy related to HTN emergency.  Asymptomatic. Recheck VBG ordered.  If hypercapnia still present, will need ABG to confirm.  4. GI/DVT prophyalxis - diet; SCDs and Heparin SC, mobility protocol.    eICU is available should acute issues arise.

## 2022-10-21 NOTE — ED NOTES
Cardene drip Restart: Per Dr Warner after discussing BP elevating and MAP increasing above expected 120  Titrate by: (in mg/hr) 2.5   Titrate interval: (in minutes) 15   Titrate to maintain: (MAP or SBP) MAP   Less than: (in mmHg) 120  Verbal approved by Dr Warner   Keep auerkve634 and 120

## 2022-10-21 NOTE — PLAN OF CARE
Problem: Adult Inpatient Plan of Care  Goal: Plan of Care Review  Outcome: Ongoing, Progressing  Goal: Patient-Specific Goal (Individualized)  Outcome: Ongoing, Progressing  Goal: Absence of Hospital-Acquired Illness or Injury  Outcome: Ongoing, Progressing  Goal: Optimal Comfort and Wellbeing  Outcome: Ongoing, Progressing  Goal: Readiness for Transition of Care  Outcome: Ongoing, Progressing     Problem: Violence Risk or Actual  Goal: Anger and Impulse Control  Outcome: Ongoing, Progressing     Problem: Fall Injury Risk  Goal: Absence of Fall and Fall-Related Injury  Outcome: Ongoing, Progressing     Problem: Skin Injury Risk Increased  Goal: Skin Health and Integrity  Outcome: Ongoing, Progressing     Problem: Nausea and Vomiting  Goal: Fluid and Electrolyte Balance  Outcome: Ongoing, Progressing     Problem: Dysrhythmia  Goal: Normalized Cardiac Rhythm  Outcome: Ongoing, Progressing

## 2022-10-21 NOTE — CARE UPDATE
"Patient screened positive on the nursing suicide screen.     The patient reports that he has intermittent thoughts of harming himself. He does not want to harm himself at this time. He has a gun at home and reported that he thought about shooting himself in the past and "put the pistol up to his head a few times". He denies harming himself and complained about being in a lot of pain. I do not believe the patient is actively suicidal at this time, but will consult psychiatry for further evaluation.       4:50 AM  Patient went into Afib w/ RVR (relatively rate controlled). This seems to be new onset for him. Will consult cardiology, maintain NPO for possible cardioversion and obtain an echocardiogram. CHADsVASc score is 5 (age, HTN, stroke), will initiate heparin.     "

## 2022-10-21 NOTE — ASSESSMENT & PLAN NOTE
Patient has new onset atrial fibrillation which is uncontrolled. Patient is currently in atrial fibrillation.    Started metoprolol   Anticoagulation indicated. Anticoagulation done with heparin gtt   TTE pending  Cardiology consulted     [id number] - VASc score:  Congestive Heart Failure or EF < 35% Unknown- check TTE   Hypertension YES  +1   Age >= 75 YES  +2   Diabetes Mellitus NO   Stroke/TIA prior history YES  +2   Vascular disease (PAD, MI or Aortic Plaque)? unknown   Age 64 - 74 NO   Female NO   Total at least 5

## 2022-10-21 NOTE — NURSING
0050 Pt arrived to unit on stretcher accompanied by ED nursing staff. ICU staff in room to received pt. Pt was able to move over from stretcher to ICU bed with minimal assistance. Pt placed on monitor. Cardene infusing at 5mg/hr to right  distal forearm PIV. Left wrist PIV fl

## 2022-10-21 NOTE — SUBJECTIVE & OBJECTIVE
Past Medical History:   Diagnosis Date    Chronic kidney disease     Fibromyalgia     Fibromyalgia     HSV (herpes simplex virus) anogenital infection     Hypertension     does not take medication    Mental disorder     history of depression       Past Surgical History:   Procedure Laterality Date    APPENDECTOMY      Collapse Lung  1969    GUM SURGERY      HERNIA REPAIR      Left Shoulder      tonsilect      TONSILLECTOMY, ADENOIDECTOMY         Review of patient's allergies indicates:   Allergen Reactions    Cymbalta [duloxetine] Other (See Comments)     Kidney failure    Iodine and iodide containing products Other (See Comments)     Chest, bladder pain    Latex, natural rubber Other (See Comments)     unknown    Asa [aspirin]      No rash, but feels bad when he takes it       No current facility-administered medications on file prior to encounter.     Current Outpatient Medications on File Prior to Encounter   Medication Sig    amLODIPine (NORVASC) 10 MG tablet Take 5 mg by mouth.    cloNIDine 0.2 mg/24 hr td ptwk (CATAPRES) 0.2 mg/24 hr 1 patch every 7 days.    rosuvastatin (CRESTOR) 40 MG Tab Take 40 mg by mouth every evening.    nystatin (MYCOSTATIN) cream Apply topically 2 (two) times daily. Apply to penis twice a day     Family History       Problem Relation (Age of Onset)    Cancer Father, Brother    Heart disease Mother, Brother    Liver disease Sister    Neuropathy Sister    Stroke Mother          Tobacco Use    Smoking status: Former     Types: Cigarettes     Start date: 1952     Quit date: 4/10/1983     Years since quittin.5    Smokeless tobacco: Never   Substance and Sexual Activity    Alcohol use: No     Comment: Heavy at one time.    Drug use: No    Sexual activity: Never     Review of Systems   Constitutional: Negative.    HENT: Negative.     Eyes: Negative.    Respiratory: Negative.     Cardiovascular: Negative.    Gastrointestinal: Negative.    Endocrine: Negative.    Genitourinary:  Negative.    Musculoskeletal: Negative.    Skin: Negative.    Allergic/Immunologic: Negative.    Neurological:  Positive for weakness and numbness.   Psychiatric/Behavioral:  Positive for confusion.    Objective:     Vital Signs (Most Recent):  Temp: 98.5 °F (36.9 °C) (10/20/22 1903)  Pulse: 89 (10/20/22 2152)  Resp: 20 (10/20/22 2152)  BP: (!) 203/93 (10/20/22 2152)  SpO2: 97 % (10/20/22 2152)   Vital Signs (24h Range):  Temp:  [97.7 °F (36.5 °C)-98.5 °F (36.9 °C)] 98.5 °F (36.9 °C)  Pulse:  [60-91] 89  Resp:  [11-34] 20  SpO2:  [95 %-99 %] 97 %  BP: (168-238)/() 203/93     Weight: 72.6 kg (160 lb)  Body mass index is 26.63 kg/m².    Physical Exam  Vitals and nursing note reviewed.   Constitutional:       General: He is not in acute distress.     Appearance: Normal appearance. He is not ill-appearing.   HENT:      Head: Normocephalic and atraumatic.      Nose: Nose normal.      Mouth/Throat:      Mouth: Mucous membranes are moist.   Eyes:      Extraocular Movements: Extraocular movements intact.   Cardiovascular:      Rate and Rhythm: Normal rate.      Pulses: Normal pulses.      Heart sounds: Murmur heard.   Pulmonary:      Effort: Pulmonary effort is normal. No respiratory distress.   Abdominal:      General: Abdomen is flat.      Palpations: Abdomen is soft.      Tenderness: There is no abdominal tenderness.   Musculoskeletal:      Right lower leg: No edema.      Left lower leg: No edema.   Skin:     General: Skin is warm.      Capillary Refill: Capillary refill takes less than 2 seconds.   Neurological:      Mental Status: He is alert and oriented to person, place, and time.      Comments: 4/5 strength in LUE              Significant Labs: All pertinent labs within the past 24 hours have been reviewed.    Significant Imaging: I have reviewed all pertinent imaging results/findings within the past 24 hours.

## 2022-10-21 NOTE — NURSING
0050 Pt arrived to ICU bed 260. Pt able to move himself from stretcher  to bed with minimal assist. Pt placed on monitor. Intial vital obtained. Cardene infusing at 5 mg/hr to right distal forearm PIV without difficulty. Left wrist PIV flushed without difficulty and saline locked. Full assessment completed and documented appropriately.   While conducting risk assessment questions, pt expressed having daily thoughts of not wanting to live. Pt stated that he would never carry out thoughts but does think about it. Dr. Portillo called to beside for further assessment. Will continue to monitor pt closely.

## 2022-10-21 NOTE — PROGRESS NOTES
[facility]  Progress Note    Patient Name: Kierra Daugherty  MRN: [id number]  Patient Class: IP- Inpatient   Admission Date: 10/20/2022  Length of Stay: 1 days  Attending Physician: Meredith Platt MD  Primary Care Provider: Antonieta Khalil MD        Subjective:     Principal Problem:New onset atrial fibrillation        HPI:  This is an 82 year old male with a PMHx of HTN, CVA, HLD who presented with left sided weakness. He is admitted for HTN emergency.     The patient reports left sided weakness that started the night before presentation. He has baseline weakness from a prior stroke but reports it has gotten worse. Additionally, he endorsed having baseline bilateral feet numbness, nausea, vomiting, confusion, blurry vision and generalized pain. He expressed frustration with his previous medical care and he was not taking any of his home medications. In the ED, the patient was hypertensive (227/99 - MAP:  142), tachypnic but otherwise stable. Labs were largely unremarkable, VBG showed acute hypercapnia with a PH of 7.34, PCO2: 51.8. CT head showed chronic ischemic changes but no acute process. Telestroke was consulted, and thought that his symptoms were related to HTN emergency, and recommended nicardipine for BP, with consideration of repeat imaging/CTA if symptoms worsen. The patient was given zofran, pepcid and was started on a nicardipine drip.       Overview/Hospital Course:  Mr Kierra Daugherty was admitted with hypertensive emergency. Started cardene gtt. BP controlled. Then developed Afib RVR. Continued BB and started heparin gtt. Cardiology consulted. He endorsed suicidal ideation. He was PEC'd and Psych consulted.       Interval History: He has many complaints this morning. He complains of bilateral eye pain. He says he has a rash around his eyes (he does not) and is requesting eye drops but does not know the name. He says he previously had blisters all around his eyes and  that affected his vision. He says they are still there but I cannot see them. He says he has a headache. He vomited after trying to eat this morning. He says he has a rash all over his body including on his penis.     Review of Systems   Constitutional:  Positive for activity change, appetite change and fatigue. Negative for chills and fever.   HENT:  Negative for congestion, mouth sores, postnasal drip, sinus pressure, sinus pain and trouble swallowing.    Eyes:  Positive for pain and visual disturbance.   Respiratory:  Positive for shortness of breath. Negative for cough and wheezing.    Cardiovascular:  Negative for chest pain, palpitations and leg swelling.   Gastrointestinal:  Positive for abdominal distention, nausea and vomiting. Negative for abdominal pain, constipation and diarrhea.   Genitourinary:  Negative for difficulty urinating.   Musculoskeletal:  Negative for arthralgias and myalgias.   Skin:  Positive for rash.   Neurological:  Negative for weakness and numbness.   Psychiatric/Behavioral:  Negative for confusion.    Objective:     Vital Signs (Most Recent):  Temp: 97.7 °F (36.5 °C) (10/21/22 0715)  Pulse: 93 (10/21/22 1030)  Resp: (!) 33 (10/21/22 1030)  BP: (!) 145/94 (10/21/22 1030)  SpO2: (!) 93 % (10/21/22 1030)   Vital Signs (24h Range):  Temp:  [97.7 °F (36.5 °C)-98.5 °F (36.9 °C)] 97.7 °F (36.5 °C)  Pulse:  [] 93  Resp:  [11-34] 33  SpO2:  [92 %-99 %] 93 %  BP: (106-238)/() 145/94     Weight: 72.4 kg (159 lb 9.8 oz)  Body mass index is 23.57 kg/m².    Intake/Output Summary (Last 24 hours) at 10/21/2022 1044  Last data filed at 10/21/2022 0736  Gross per 24 hour   Intake 220.67 ml   Output 700 ml   Net -479.33 ml      Physical Exam  Vitals and nursing note reviewed.   Constitutional:       General: He is not in acute distress.     Appearance: He is not ill-appearing, toxic-appearing or diaphoretic.   HENT:      Head: Normocephalic and atraumatic.      Nose: Nose normal.       Mouth/Throat:      Mouth: Mucous membranes are moist.   Eyes:      General: No scleral icterus.        Right eye: No discharge.         Left eye: No discharge.      Extraocular Movements: Extraocular movements intact.      Conjunctiva/sclera: Conjunctivae normal.      Pupils: Pupils are equal, round, and reactive to light.      Comments: No rash present.    Cardiovascular:      Rate and Rhythm: Tachycardia present. Rhythm irregular.      Pulses: Normal pulses.      Heart sounds: Normal heart sounds. No murmur heard.    No gallop.      Comments: Afib on monitor  Pulmonary:      Effort: Pulmonary effort is normal. No respiratory distress.      Breath sounds: Normal breath sounds. No wheezing or rales.      Comments: Room air  Abdominal:      General: Bowel sounds are normal. There is distension.      Palpations: Abdomen is soft. There is no mass.      Tenderness: There is no abdominal tenderness. There is no guarding.   Genitourinary:     Comments: White substance under foreskin around glans penis. No ulcerations. No lesions.   Musculoskeletal:         General: No swelling or tenderness.      Right lower leg: No edema.      Left lower leg: No edema.   Skin:     General: Skin is warm and dry.      Coloration: Skin is not jaundiced.      Findings: No bruising, erythema, lesion or rash.   Neurological:      Mental Status: He is alert and oriented to person, place, and time. Mental status is at baseline.       Significant Labs: All pertinent labs within the past 24 hours have been reviewed.    Significant Imaging: I have reviewed all pertinent imaging results/findings within the past 24 hours.      Assessment/Plan:      * New onset atrial fibrillation  Patient has new onset atrial fibrillation which is uncontrolled. Patient is currently in atrial fibrillation.    Started metoprolol   Anticoagulation indicated. Anticoagulation done with heparin gtt   TTE pending  Cardiology consulted     [id number] - VASc score:  Congestive  Heart Failure or EF < 35% Unknown- check TTE   Hypertension YES  +1   Age >= 75 YES  +2   Diabetes Mellitus NO   Stroke/TIA prior history YES  +2   Vascular disease (PAD, MI or Aortic Plaque)? unknown   Age 64 - 74 NO   Female NO   Total at least 5           Suicidal ideation  PEC'd  Psychiatry consult       History of CVA (cerebrovascular accident)  - W/ left sided residual weakness   - Continue statin  - will need anticoagulation upon discharge       Hypertensive emergency  BP uncontrolled on admit with weakness and multiple other complaints. No new stroke on CTH. No signs of CHF. Renal function normal. Previously treated with cardene gtt, now off  - continue amlodipine 5, metoprolol 50 BID    Mixed hyperlipidemia  Lipids are very poorly controlled  Lab Results   Component Value Date    CHOL 231 (H) 10/20/2022    CHOL 271 (H) 10/19/2018     Lab Results   Component Value Date    HDL 33 (L) 10/20/2022    HDL 34 (L) 10/19/2018     Lab Results   Component Value Date    LDLCALC Invalid, Trig>400.0 10/20/2022    LDLCALC 178.6 (H) 10/19/2018     Lab Results   Component Value Date    TRIG 470 (H) 10/20/2022    TRIG 292 (H) 10/19/2018     Lab Results   Component Value Date    CHOLHDL 14.3 (L) 10/20/2022    CHOLHDL 12.5 (L) 10/19/2018     Continue atorvastatin        VTE Risk Mitigation (From admission, onward)           Ordered     heparin 25,000 units in dextrose 5% (100 units/ml) IV bolus from bag - ADDITIONAL PRN BOLUS - 60 units/kg  As needed (PRN)        Question:  Heparin Infusion Adjustment (DO NOT MODIFY ANSWER)  Answer:  \\[email or web address]\Images\Pharmacy\HeparinInfusions\heparin LOW INTENSITY nomogram for [facility] [id number]    10/21/22 0454     heparin 25,000 units in dextrose 5% (100 units/ml) IV bolus from bag - ADDITIONAL PRN BOLUS - 30 units/kg  As needed (PRN)        Question:  Heparin Infusion Adjustment (DO NOT MODIFY ANSWER)  Answer:  \\[email or web address]\Images\Pharmacy\HeparinInfusions\heparin LOW INTENSITY  nomogram for [facility] [id number]    10/21/22 0454     heparin 25,000 units in dextrose 5% 250 mL (100 units/mL) infusion LOW INTENSITY nomogram - [facility]  Continuous        Question Answer Comment   Heparin Infusion Adjustment (DO NOT MODIFY ANSWER) [email or web address]\Images\Pharmacy\HeparinInfusions\heparin LOW INTENSITY nomogram for [facility] [id number]    Begin at (in units/kg/hr) 12        10/21/22 0454     IP VTE HIGH RISK PATIENT  Once         10/20/22 2139     Place sequential compression device  Until discontinued         10/20/22 2139                    Discharge Planning   BALDEV:      Code Status: Full Code   Is the patient medically ready for discharge?:     Reason for patient still in hospital (select all that apply): Patient trending condition         1:55 PM  Acute change in mental status- repeating himself, disoriented. Previously was odd but answering questions appropriately. Just converted to NSR. On heparin gtt. Stroke code called. Going down to [facility] now.       Critical care time spent on the evaluation and treatment of severe organ dysfunction, review of pertinent labs and imaging studies, discussions with consulting providers and discussions with patient/family: 30 minutes.      Meredith Platt MD  Department of Hospital Medicine   [facility] [place] - Intensive Care

## 2022-10-21 NOTE — NURSING
Pt belongings and valuables picked up by House supervisor to be secured until pt is discharged. Pt belongings itemized and charted. All belongings reviewed and verified with patient.

## 2022-10-21 NOTE — HOSPITAL COURSE
Mr Kierra Daugherty was admitted with hypertensive emergency. Started cardene gtt. BP controlled with change in medications- now on amlodipine, metoprolol, and HCTZ. Then developed Afib RVR. Continued BB and started eliquis. Cardiology consulted. He endorsed suicidal ideation. He was PEC'd and Psych consulted. He had delirium. Code Stroke was performed on 2 occasions for confusion, both times negative for acute infarct. CTA did show 80-95% narrowing of proximal L ICA. He has remained afebrile and hemodynamically stable.  He is stable for discharge to inpatient psych placement.

## 2022-10-21 NOTE — ASSESSMENT & PLAN NOTE
BP uncontrolled on admit with weakness and multiple other complaints. No new stroke on CTH. No signs of CHF. Renal function normal. Previously treated with cardene gtt, now off  - continue amlodipine 5, metoprolol 50 BID

## 2022-10-21 NOTE — ASSESSMENT & PLAN NOTE
Patient has a current diagnosis of Hypertensive emergency with end organ damage evidenced by neurologic symptoms which is uncontrolled.  Latest blood pressure and vitals reviewed-   Temp:  [97.7 °F (36.5 °C)-98.5 °F (36.9 °C)]   Pulse:  [60-91]   Resp:  [11-34]   BP: (168-238)/()   SpO2:  [95 %-99 %] .   Patient currently on IV antihypertensives.   Home meds for hypertension were reviewed and noted below.   Hypertension Medications             amLODIPine (NORVASC) 10 MG tablet Take 5 mg by mouth.    cloNIDine 0.2 mg/24 hr td ptwk (CATAPRES) 0.2 mg/24 hr 1 patch every 7 days.          Medication adjustment for hospital antihypertensives is as follows-   - Resume nicardipine to achieve a a SBP of 160-180     Will aim for controlled BP reduction by medications noted above. Monitor and mitigate end organ damage as indicated.

## 2022-10-21 NOTE — H&P
[facility] Medicine  History & Physical    Patient Name: Kierra Daugherty  MRN: [id number]  Patient Class: IP- Inpatient  Admission Date: 10/20/2022  Attending Physician: Meredith Platt MD   Primary Care Provider: Antonieta Khalil MD         Patient information was obtained from patient and ER records.     Subjective:     Principal Problem:Hypertensive emergency    Chief Complaint:   Chief Complaint   Patient presents with    Weakness     81 yo male to triage for increased left sided weakness, confusion, nausea, and elevated BP around noon. Pt says she already had weakness but it increased this afternoon. /99 while being triaged, CBG-118. Pt has no facial droop or slurred speech noted. Pt was Stroke Activated by Dr. Warner @ 5955.     Hypertension        HPI: This is an 82 year old male with a PMHx of HTN, CVA, HLD who presented with left sided weakness. He is admitted for HTN emergency.     The patient reports left sided weakness that started the night before presentation. He has baseline weakness from a prior stroke but reports it has gotten worse. Additionally, he endorsed having baseline bilateral feet numbness, nausea, vomiting, confusion, blurry vision and generalized pain. He expressed frustration with his previous medical care and he was not taking any of his home medications. In the ED, the patient was hypertensive (227/99 - MAP:  142), tachypnic but otherwise stable. Labs were largely unremarkable, VBG showed acute hypercapnia with a PH of 7.34, PCO2: 51.8. CT head showed chronic ischemic changes but no acute process. Telestroke was consulted, and thought that his symptoms were related to HTN emergency, and recommended nicardipine for BP, with consideration of repeat imaging/CTA if symptoms worsen. The patient was given zofran, pepcid and was started on a nicardipine drip.       Past Medical History:   Diagnosis Date    Chronic kidney disease     Fibromyalgia      Fibromyalgia     HSV (herpes simplex virus) anogenital infection     Hypertension     does not take medication    Mental disorder     history of depression       Past Surgical History:   Procedure Laterality Date    APPENDECTOMY      Collapse Lung  1969    GUM SURGERY      HERNIA REPAIR      Left Shoulder      tonsilect      TONSILLECTOMY, ADENOIDECTOMY         Review of patient's allergies indicates:   Allergen Reactions    Cymbalta [duloxetine] Other (See Comments)     Kidney failure    Iodine and iodide containing products Other (See Comments)     Chest, bladder pain    Latex, natural rubber Other (See Comments)     unknown    Asa [aspirin]      No rash, but feels bad when he takes it       No current facility-administered medications on file prior to encounter.     Current Outpatient Medications on File Prior to Encounter   Medication Sig    amLODIPine (NORVASC) 10 MG tablet Take 5 mg by mouth.    cloNIDine 0.2 mg/24 hr td ptwk (CATAPRES) 0.2 mg/24 hr 1 patch every 7 days.    rosuvastatin (CRESTOR) 40 MG Tab Take 40 mg by mouth every evening.    nystatin (MYCOSTATIN) cream Apply topically 2 (two) times daily. Apply to penis twice a day     Family History       Problem Relation (Age of Onset)    Cancer Father, Brother    Heart disease Mother, Brother    Liver disease Sister    Neuropathy Sister    Stroke Mother          Tobacco Use    Smoking status: Former     Types: Cigarettes     Start date: 1952     Quit date: 4/10/1983     Years since quittin.5    Smokeless tobacco: Never   Substance and Sexual Activity    Alcohol use: No     Comment: Heavy at one time.    Drug use: No    Sexual activity: Never     Review of Systems   Constitutional: Negative.    HENT: Negative.     Eyes: Negative.    Respiratory: Negative.     Cardiovascular: Negative.    Gastrointestinal: Negative.    Endocrine: Negative.    Genitourinary: Negative.    Musculoskeletal: Negative.    Skin: Negative.     Allergic/Immunologic: Negative.    Neurological:  Positive for weakness and numbness.   Psychiatric/Behavioral:  Positive for confusion.    Objective:     Vital Signs (Most Recent):  Temp: 98.5 °F (36.9 °C) (10/20/22 1903)  Pulse: 89 (10/20/22 2152)  Resp: 20 (10/20/22 2152)  BP: (!) 203/93 (10/20/22 2152)  SpO2: 97 % (10/20/22 2152)   Vital Signs (24h Range):  Temp:  [97.7 °F (36.5 °C)-98.5 °F (36.9 °C)] 98.5 °F (36.9 °C)  Pulse:  [60-91] 89  Resp:  [11-34] 20  SpO2:  [95 %-99 %] 97 %  BP: (168-238)/() 203/93     Weight: 72.6 kg (160 lb)  Body mass index is 26.63 kg/m².    Physical Exam  Vitals and nursing note reviewed.   Constitutional:       General: He is not in acute distress.     Appearance: Normal appearance. He is not ill-appearing.   HENT:      Head: Normocephalic and atraumatic.      Nose: Nose normal.      Mouth/Throat:      Mouth: Mucous membranes are moist.   Eyes:      Extraocular Movements: Extraocular movements intact.   Cardiovascular:      Rate and Rhythm: Normal rate.      Pulses: Normal pulses.      Heart sounds: Murmur heard.   Pulmonary:      Effort: Pulmonary effort is normal. No respiratory distress.   Abdominal:      General: Abdomen is flat.      Palpations: Abdomen is soft.      Tenderness: There is no abdominal tenderness.   Musculoskeletal:      Right lower leg: No edema.      Left lower leg: No edema.   Skin:     General: Skin is warm.      Capillary Refill: Capillary refill takes less than 2 seconds.   Neurological:      Mental Status: He is alert and oriented to person, place, and time.      Comments: 4/5 strength in LUE              Significant Labs: All pertinent labs within the past 24 hours have been reviewed.    Significant Imaging: I have reviewed all pertinent imaging results/findings within the past 24 hours.    Assessment/Plan:     * Hypertensive emergency  Patient has a current diagnosis of Hypertensive emergency with end organ damage evidenced by neurologic  symptoms which is uncontrolled.  Latest blood pressure and vitals reviewed-   Temp:  [97.7 °F (36.5 °C)-98.5 °F (36.9 °C)]   Pulse:  [60-91]   Resp:  [11-34]   BP: (168-238)/()   SpO2:  [95 %-99 %] .   Patient currently on IV antihypertensives.   Home meds for hypertension were reviewed and noted below.   Hypertension Medications             amLODIPine (NORVASC) 10 MG tablet Take 5 mg by mouth.    cloNIDine 0.2 mg/24 hr td ptwk (CATAPRES) 0.2 mg/24 hr 1 patch every 7 days.          Medication adjustment for hospital antihypertensives is as follows-   - Resume nicardipine to achieve a a SBP of 160-180     Will aim for controlled BP reduction by medications noted above. Monitor and mitigate end organ damage as indicated.    History of CVA (cerebrovascular accident)  - W/ left sided residual weakness   - Resume home medications       Mixed hyperlipidemia  Resume home medications         VTE Risk Mitigation (From admission, onward)         Ordered     heparin (porcine) injection 5,000 Units  Every 8 hours         10/20/22 2139     IP VTE HIGH RISK PATIENT  Once         10/20/22 2139     Place sequential compression device  Until discontinued         10/20/22 2139                   Critical care time spent on the evaluation and treatment of severe organ dysfunction, review of pertinent labs and imaging studies, discussions with consulting providers and discussions with patient/family: 60 minutes.      Seamus Portillo MD  Department of Hospital Medicine   [facility] - [place] - Emergency Dept

## 2022-10-21 NOTE — HPI
"Patient Kierra Daugherty presented to the hospital with left sided weakness and admitted for hypertensive emergency.  He also expressed desire to shot himself with a gun that he has at home.  PEC placed and  has come by today to place CEC on chart.  Patient was evaluated in room after awakening but is confused.  He answers most questions with "I am not sure what is going on".  He is oriented to name only and every other question is answered with "I am not sure what is going on".  Unknown psychiatric history or prior self harm.  Chart review shows that his PCP evaluated and diagnosed him with depression in 2018.  At that time, he disclosed to her that he abused himself anally and was making bizarre statements to her.  Nursing reports that patient has been odd, needy, and staring a lot.  He is not able to provide any information about where he lives or if he has family.  Chart review also shows that he did get haloperidol injections on 5/18/21 after some bizarre behavior in the ED, which cleared and was discharged home.  "

## 2022-10-21 NOTE — SUBJECTIVE & OBJECTIVE
Past Medical History:   Diagnosis Date    Chronic kidney disease     Fibromyalgia     Fibromyalgia     HSV (herpes simplex virus) anogenital infection     Hypertension     does not take medication    Mental disorder     history of depression       Past Surgical History:   Procedure Laterality Date    APPENDECTOMY      Collapse Lung  1969    GUM SURGERY      HERNIA REPAIR      Left Shoulder      tonsilect      TONSILLECTOMY, ADENOIDECTOMY         Review of patient's allergies indicates:   Allergen Reactions    Cymbalta [duloxetine] Other (See Comments)     Kidney failure    Iodine and iodide containing products Other (See Comments)     Chest, bladder pain    Latex, natural rubber Other (See Comments)     unknown    Asa [aspirin]      No rash, but feels bad when he takes it       No current facility-administered medications on file prior to encounter.     Current Outpatient Medications on File Prior to Encounter   Medication Sig    amLODIPine (NORVASC) 10 MG tablet Take 5 mg by mouth.    cloNIDine 0.2 mg/24 hr td ptwk (CATAPRES) 0.2 mg/24 hr 1 patch every 7 days.    nystatin (MYCOSTATIN) cream Apply topically 2 (two) times daily. Apply to penis twice a day    rosuvastatin (CRESTOR) 40 MG Tab Take 40 mg by mouth every evening.     Family History       Problem Relation (Age of Onset)    Cancer Father, Brother    Heart disease Mother, Brother    Liver disease Sister    Neuropathy Sister    Stroke Mother          Tobacco Use    Smoking status: Former     Types: Cigarettes     Start date: 1951     Quit date: 4/10/1983     Years since quittin.5    Smokeless tobacco: Never   Substance and Sexual Activity    Alcohol use: No     Comment: Heavy at one time.    Drug use: No    Sexual activity: Never     Review of Systems   Constitutional: Negative for diaphoresis and malaise/fatigue.   Eyes:  Negative for blurred vision, double vision, photophobia, vision loss in left eye, vision loss in right eye, visual disturbance  and visual halos.   Cardiovascular:  Positive for irregular heartbeat. Negative for chest pain, dyspnea on exertion, leg swelling, near-syncope, orthopnea, palpitations, paroxysmal nocturnal dyspnea and syncope.   Respiratory:  Negative for shortness of breath, sputum production and wheezing.    Gastrointestinal:  Negative for abdominal pain, heartburn, hematemesis and melena.   Neurological:  Positive for headaches. Negative for dizziness, focal weakness, light-headedness, numbness, paresthesias, seizures and weakness.   Psychiatric/Behavioral:  Negative for hallucinations. The patient is not nervous/anxious.    Objective:     Vital Signs (Most Recent):  Temp: 97.7 °F (36.5 °C) (10/21/22 0715)  Pulse: 93 (10/21/22 0900)  Resp: 17 (10/21/22 0900)  BP: (!) 145/69 (10/21/22 0900)  SpO2: (!) 94 % (10/21/22 0900)   Vital Signs (24h Range):  Temp:  [97.7 °F (36.5 °C)-98.5 °F (36.9 °C)] 97.7 °F (36.5 °C)  Pulse:  [] 93  Resp:  [11-34] 17  SpO2:  [92 %-99 %] 94 %  BP: (106-238)/() 145/69     Weight: 72.4 kg (159 lb 9.8 oz)  Body mass index is 23.57 kg/m².    SpO2: (!) 94 %  O2 Device (Oxygen Therapy): room air      Intake/Output Summary (Last 24 hours) at 10/21/2022 0931  Last data filed at 10/21/2022 0736  Gross per 24 hour   Intake 220.67 ml   Output 700 ml   Net -479.33 ml       Lines/Drains/Airways       Peripheral Intravenous Line  Duration                  Peripheral IV - Single Lumen 10/20/22 1623 22 G Anterior;Distal;Right Forearm <1 day         Peripheral IV - Single Lumen 10/20/22 1640 20 G Left Wrist <1 day                    Physical Exam  Vitals reviewed.   Constitutional:       General: He is not in acute distress.     Appearance: He is not diaphoretic.   Neck:      Vascular: No carotid bruit or JVD.   Cardiovascular:      Rate and Rhythm: Normal rate. Rhythm irregularly irregular.      Pulses: Normal pulses.   Pulmonary:      Effort: Pulmonary effort is normal.      Breath sounds: Normal breath  sounds.   Abdominal:      General: Bowel sounds are normal.      Palpations: Abdomen is soft.      Tenderness: There is no abdominal tenderness.   Musculoskeletal:      Right lower leg: No edema.      Left lower leg: No edema.   Neurological:      Mental Status: He is alert and oriented to person, place, and time.   Psychiatric:         Speech: Speech normal.         Behavior: Behavior normal.       Significant Labs: CMP   Recent Labs   Lab 10/20/22  1643 10/21/22  0237    140   K 4.8 4.1    108   CO2 21* 23   GLU 96 125*   BUN 27* 20   CREATININE 1.2 1.0   CALCIUM 8.8 8.8   PROT 7.1  --    ALBUMIN 3.9  --    BILITOT 0.3  --    ALKPHOS 75  --    AST 33  --    ALT 22  --    ANIONGAP 11 9   , CBC   Recent Labs   Lab 10/20/22  1643 10/20/22  1643 10/21/22  0237 10/21/22  0506   WBC 5.92  --  8.26 8.21   HGB 14.2  --  14.2 15.0   HCT 40.6   < > 41.2 43.9     --  181 180    < > = values in this interval not displayed.   , and Troponin   Recent Labs   Lab 10/20/22  1643   TROPONINI <0.006       Significant Imaging: EKG: Atrial fibrillation with RVR

## 2022-10-21 NOTE — CARE UPDATE
[facility] [place] Intensive Care  ICU Shift Summary  Date: 10/21/2022      Prehospitalization: Home  Admit Date / LOS : 10/20/2022/ 1 days    Diagnosis: New onset atrial fibrillation    Consults:        Active: Cardio, Neuro, Psych, and SW       Needed: N/A     Code Status: Full Code   Advanced Directive: <no information>    LDA:  Lines/Drains/Airways       Peripheral Intravenous Line  Duration                  Peripheral IV - Single Lumen 10/20/22 1623 22 G Anterior;Distal;Right Forearm <1 day         Peripheral IV - Single Lumen 10/20/22 1640 20 G Left Wrist <1 day                  Central Lines/Site/Justification:Patient Does Not Have Central Line  Urinary Cath/Order/Justification:Patient Does Not Have Urinary Catheter    Vasopressors/Infusions:    heparin (porcine) in D5W 12 Units/kg/hr (10/21/22 1307)          GOALS: Volume/ Hemodynamic: N/A                     RASS: N/A    Pain Management: none       Pain Controlled: no     Rhythm: NSR    Respiratory Device: Room Air                  Most Recent SBT/ SAT: N/A       MOVE Screen: FAIL  ICU Liberation: not applicable    VTE Prophylaxis: Pharm  Mobility: Bedrest  Stress Ulcer Prophylaxis: No    Isolation: No active isolations    Dietary:   Current Diet Order   Procedures    [facility]; PEC/CEC - Styrofoam     Order Specific Question:   Indicate patient location for additional diet options:     Answer:   [facility]     Order Specific Question:   Tray type:     Answer:   PEC/CEC - Styrofoam    Diet NPO      Tolerance: no  Advancement: no    I & O (24h):    Intake/Output Summary (Last 24 hours) at 10/21/2022 1618  Last data filed at 10/21/2022 1500  Gross per 24 hour   Intake 345.67 ml   Output 1100 ml   Net -754.33 ml        Restraints: No    Significant Dates:  Post Op Date: N/A  Rescue Date: N/A  Imaging/ Diagnostics:  CTA brain and Echo    Noteworthy Labs:  see below    COVID Test: (--)  CBC/Anemia Labs: Coags:    Recent Labs   Lab  10/21/22  0237 10/21/22  0506   WBC 8.26 8.21   HGB 14.2 15.0   HCT 41.2 43.9    180   MCV 92 91   RDW 12.8 12.8    Recent Labs   Lab 10/20/22  1643 10/21/22  0506 10/21/22  1148   INR 0.9 1.0  --    APTT 25.0 25.6 61.1*        Chemistries:   Recent Labs   Lab 10/20/22  1643 10/21/22  0237    140   K 4.8 4.1    108   CO2 21* 23   BUN 27* 20   CREATININE 1.2 1.0   CALCIUM 8.8 8.8   PROT 7.1  --    BILITOT 0.3  --    ALKPHOS 75  --    ALT 22  --    AST 33  --    MG  --  2.0   PHOS  --  2.5*        Cardiac Enzymes: Ejection Fractions:    Recent Labs     10/20/22  1643   TROPONINI <0.006    EF   Date Value Ref Range Status   10/21/2022 55 % Final        POCT Glucose: HbA1c:    Recent Labs   Lab 10/21/22  1349   POCTGLUCOSE 133*    Hemoglobin A1C   Date Value Ref Range Status   04/28/2022 5.6 <5.7 % Final   10/19/2018 5.4 4.0 - 5.6 % Final     Comment:     ADA Screening Guidelines:  5.7-6.4%  Consistent with prediabetes  >or=6.5%  Consistent with diabetes  High levels of fetal hemoglobin interfere with the HbA1C  assay. Heterozygous hemoglobin variants (HbS, HgC, etc)do  not significantly interfere with this assay.   However, presence of multiple variants may affect accuracy.             ICU LOS 17h  Level of Care: Critical Care    Chart Check: 12 HR Done  Shift Summary/Plan for the shift: Pt PEC/CEC. Pt c/o N/V; PRN zofran, pepto bismol, and   Compazeine given. Echo performed and metoprolol given. Pt converted to NSR and 12 lead EKG performed at 1325. Code stroke called (see other note and stroke documentation). CTA head performed. Heparin gtt continued. BP controlled and off cardene gtt. Clear liquid diet ordered. Pt remains free from injury. Vs and assessment per flow sheet. Pt remains in ICU.

## 2022-10-21 NOTE — PLAN OF CARE
[facility] - Intensive Care  Initial Discharge Assessment  (Chart Review Only)     Primary Care Provider: Antonieta Khalil MD    Admission Diagnosis: Stroke [I63.9]  Hypertensive emergency [I16.1]    Admission Date: 10/20/2022  Expected Discharge Date:     Discharge Barriers Identified: No family/friends to help, Other (see comments)    Payor: [employer] MANAGED MEDICARE / Plan: [employer] SECURE HEALTH / Product Type: Medicare Advantage /     Extended Emergency Contact Information  Primary Emergency Contact: Hunter Viera  Address: UNKNOWN   [place]  Mobile Phone: [phone]  Relation: Friend  Secondary Emergency Contact: Fady Daugherty  Mobile Phone: [phone]  Relation: Relative   needed? No    Discharge Plan A: Psychiatric hospital  Discharge Plan B: Home      [facility] - [place], [place] - [address]  [address]  [place] [address]  Phone: [phone] Fax: [phone]      Initial Assessment (most recent)       Adult Discharge Assessment - 10/21/22 1810          Discharge Assessment    Assessment Type Discharge Planning Assessment     Source of Information health record   Patient currently confused.  Has been PEC'd/CEC'd.    If unable to respond/provide information was family/caregiver contacted? Other (see comments)   Attempted contact with nephewFady. Voice message left requesting return call.    Reason For Admission Stroke     Lives With alone     Facility Arrived From: home     Prior to hospitilization cognitive status: Unable to Assess     Current cognitive status: Inappropriate Behavior     Readmission within 30 days? No   per chart review    Patient currently being followed by outpatient case management? No     Do you currently have service(s) that help you manage your care at home? No     Do you have prescription coverage? Yes     Coverage Recommend [facility]     Are you on dialysis? No     Do you take coumadin? No     Discharge Plan A  Psychiatric hospital     Discharge Plan B Home     DME Needed Upon Discharge  none     Discharge Plan discussed with: --   Need to f/u with family.  Lives alone.  Currently confused and PEC'd/CEC'd    Discharge Barriers Identified No family/friends to help;Other (see comments)

## 2022-10-21 NOTE — ASSESSMENT & PLAN NOTE
Unknown cause of AMS.  Seems to have a history of periodic altered state, as per chart review.  May or may not have underlying dementia picture.  Can be further assessed after medical clearance.  In the past, haloperidol has worked to calm him down.  Utilize haloperidol 1-2 mg IV every 2 hours PRN acute psychotic or non-redirectable agitation (QTc 408 ms - 10/21/22).

## 2022-10-21 NOTE — SUBJECTIVE & OBJECTIVE
Patient History               Medical as of 10/21/2022       Past Medical History       Diagnosis Date Comments Source    Chronic kidney disease -- -- Provider    Fibromyalgia -- -- Provider    Fibromyalgia -- -- Provider    HSV (herpes simplex virus) anogenital infection -- -- Provider    Hypertension -- does not take medication Provider    Mental disorder -- history of depression Provider                          Surgical as of 10/21/2022       Past Surgical History       Procedure Laterality Date Comments Source    Collapse Lung [Other] -- 1969 -- Provider    HERNIA REPAIR -- -- -- Provider    GUM SURGERY -- -- -- Provider    APPENDECTOMY -- -- -- Provider    tonsilect [Other] -- -- -- Provider    Left Shoulder [Other] -- -- -- Provider    TONSILLECTOMY, ADENOIDECTOMY -- -- -- Provider                          Family as of 10/21/2022       Problem Relation Name Age of Onset Comments Source    Stroke Mother -- -- -- Provider    Heart disease Mother -- -- -- Provider    Cancer Father -- -- -- Provider    Liver disease Sister -- -- -- Provider    Neuropathy Sister -- -- -- Provider    Heart disease Brother -- -- -- Provider    Cancer Brother -- -- -- Provider                  Tobacco Use as of 10/21/2022       Smoking Status Smoking Start Date Quit Date Smoking Frequency    Former 11/8/1952 4/10/1983       Smokeless Status Smokeless Type Smokeless Quit Date    Never -- --      Source    Provider                  Alcohol Use as of 10/21/2022       Alcohol Use Drinks/Week Alcohol/Week Comments Source    No   -- Heavy at one time. Provider                  Drug Use as of 10/21/2022       Drug Use Types Frequency Comments Source    No -- -- -- Provider                  Sexual Activity as of 10/21/2022       Sexually Active Birth Control Partners Comments Source    Never -- -- -- Provider                  Activities of Daily Living as of 10/21/2022    None               Social Documentation as of 10/21/2022     None               Occupational as of 10/21/2022    None               Socioeconomic as of 10/21/2022       Marital Status Spouse Name Number of Children Years Education Education Level Preferred Language Ethnicity Race Source    Single -- -- -- -- English Not  or /a White Provider                  Pertinent History       Question Response Comments    Lives with alone --    Place in Birth Order -- --    Lives in -- --    Number of Siblings -- --    Raised by -- --    Legal Involvement -- --    Childhood Trauma -- --    Criminal History of -- --    Financial Status -- --    Highest Level of Education -- --    Does patient have access to a firearm? -- --     Service -- --    Primary Leisure Activity -- --    Spirituality -- --          Past Medical History:   Diagnosis Date    Chronic kidney disease     Fibromyalgia     Fibromyalgia     HSV (herpes simplex virus) anogenital infection     Hypertension     does not take medication    Mental disorder     history of depression     Past Surgical History:   Procedure Laterality Date    APPENDECTOMY      Collapse Lung  1969    GUM SURGERY      HERNIA REPAIR      Left Shoulder      tonsilect      TONSILLECTOMY, ADENOIDECTOMY       Family History       Problem Relation (Age of Onset)    Cancer Father, Brother    Heart disease Mother, Brother    Liver disease Sister    Neuropathy Sister    Stroke Mother          Tobacco Use    Smoking status: Former     Types: Cigarettes     Start date: 1952     Quit date: 4/10/1983     Years since quittin.5    Smokeless tobacco: Never   Substance and Sexual Activity    Alcohol use: No     Comment: Heavy at one time.    Drug use: No    Sexual activity: Never     Review of patient's allergies indicates:   Allergen Reactions    Cymbalta [duloxetine] Other (See Comments)     Kidney failure    Iodine and iodide containing products Other (See Comments)     Chest, bladder pain    Latex, natural rubber Other (See  "Comments)     unknown    Asa [aspirin]      No rash, but feels bad when he takes it       No current facility-administered medications on file prior to encounter.     Current Outpatient Medications on File Prior to Encounter   Medication Sig    amLODIPine (NORVASC) 10 MG tablet Take 5 mg by mouth.    cloNIDine 0.2 mg/24 hr td ptwk (CATAPRES) 0.2 mg/24 hr 1 patch every 7 days.    nystatin (MYCOSTATIN) cream Apply topically 2 (two) times daily. Apply to penis twice a day    rosuvastatin (CRESTOR) 40 MG Tab Take 40 mg by mouth every evening.     Psychotherapeutics (From admission, onward)      None          Review of Systems   Unable to perform ROS: Mental status change   Strengths and Liabilities: Liability: Patient has no suport network., Liability: Patient has poor health., Liability: Patient has possible cognitive impairment., Liability: Patient lacks coping skills.    Objective:     Vital Signs (Most Recent):  Temp: 97.5 °F (36.4 °C) (10/21/22 1200)  Pulse: 90 (10/21/22 1300)  Resp: 13 (10/21/22 1300)  BP: 108/64 (10/21/22 1300)  SpO2: (!) 94 % (10/21/22 1300)   Vital Signs (24h Range):  Temp:  [97.5 °F (36.4 °C)-98.5 °F (36.9 °C)] 97.5 °F (36.4 °C)  Pulse:  [] 90  Resp:  [11-34] 13  SpO2:  [92 %-99 %] 94 %  BP: (102-238)/() 108/64     Height: 5' 9" (175.3 cm)  Weight: 72.4 kg (159 lb 9.8 oz)  Body mass index is 23.57 kg/m².      Intake/Output Summary (Last 24 hours) at 10/21/2022 1422  Last data filed at 10/21/2022 1200  Gross per 24 hour   Intake 345.67 ml   Output 950 ml   Net -604.33 ml       Physical Exam  Psychiatric:      Comments: EXAMINATION    CONSTITUTIONAL  General Appearance: hospital attire    MUSCULOSKELETAL  Muscle Strength and Tone: normal  Abnormal Involuntary Movements: none noted  Gait and Station: not observed    PSYCHIATRIC MENTAL STATUS EXAM   Level of Consciousness: awake and alert  Orientation: name only  Grooming: limited  Psychomotor Behavior: normal  Speech: soft, normal " "tone  Language: no abnormalities  Mood: not able to obtain  Affect: confused  Thought Process: perseverative  Associations: not intact  Thought Content: repeating "I don't know what is going on"  Memory: poor to recent or remote  Attention: distracted  Fund of Knowledge: poor to situation  Insight: limited into current situation  Judgment: fair into redirection            Significant Labs: All pertinent labs within the past 24 hours have been reviewed.    Significant Imaging: I have reviewed all pertinent imaging results/findings within the past 24 hours.  "

## 2022-10-21 NOTE — ASSESSMENT & PLAN NOTE
Lipids are very poorly controlled  Lab Results   Component Value Date    CHOL 231 (H) 10/20/2022    CHOL 271 (H) 10/19/2018     Lab Results   Component Value Date    HDL 33 (L) 10/20/2022    HDL 34 (L) 10/19/2018     Lab Results   Component Value Date    LDLCALC Invalid, Trig>400.0 10/20/2022    LDLCALC 178.6 (H) 10/19/2018     Lab Results   Component Value Date    TRIG 470 (H) 10/20/2022    TRIG 292 (H) 10/19/2018     Lab Results   Component Value Date    CHOLHDL 14.3 (L) 10/20/2022    CHOLHDL 12.5 (L) 10/19/2018     Continue atorvastatin

## 2022-10-21 NOTE — ASSESSMENT & PLAN NOTE
10/21/2022:  Patient started on heparin drip.  Lopressor for rate control.  Patient did eat this morning.  Currently nauseated.  Will change to Toprol twice daily with hopes of converting to sinus rhythm.  If not can proceed with cardioversion in a.m. on 10/22/2022.  Would be within 48 hours of onset.  No need for transesophageal echocardiogram.

## 2022-10-21 NOTE — ASSESSMENT & PLAN NOTE
- W/ left sided residual weakness   - Continue statin  - will need anticoagulation upon discharge

## 2022-10-21 NOTE — CONSULTS
[facility]  Vascular Neurology  Comprehensive Stroke Center  TeleVascular Neurology Acute Consultation Note      Consults    Consulting Provider: ARIS MONTAÑO  Current Providers  No providers found    Patient Location:  [facility] ICU IP Unit  Spoke hospital nurse at bedside with patient assisting consultant.     Patient information was obtained from primary team.         Assessment/Plan:       Diagnoses:   Aphasia  Transient symptoms likely delirium. Mostly repeating phrases. Normal exam currently,  CT/CTA reviewed and are negative for hemorrhage or L/mVO.  At this time would continue heparin gtt and transition to DOAC when appropriate per primary team.  Continue w/ psych consult.         STROKE DOCUMENTATION     Acute Stroke Times:   Acute Stroke Times   Last Known Normal Time: 1330  Symptom Onset Date: 10/20/22  Symptom Onset Time: 1200  Stroke Team Called Time: 1355  Stroke Team Arrival Time: 1400  CT Interpretation Time: 1624  Alteplase Recommended: No    NIH Scale:  1a. Level of Consciousness: 0-->Alert, keenly responsive  1b. LOC Questions: 0-->Answers both questions correctly  1c. LOC Commands: 0-->Performs both tasks correctly  2. Best Gaze: 0-->Normal  3. Visual: 0-->No visual loss  4. Facial Palsy: 0-->Normal symmetrical movements  5a. Motor Arm, Left: 0-->No drift, limb holds 90 (or 45) degrees for full 10 secs  5b. Motor Arm, Right: 0-->No drift, limb holds 90 (or 45) degrees for full 10 secs  6a. Motor Leg, Left: 0-->No drift, leg holds 30 degree position for full 5 secs  6b. Motor Leg, Right: 0-->No drift, leg holds 30 degree position for full 5 secs  7. Limb Ataxia: 0-->Absent  8. Sensory: 0-->Normal, no sensory loss  9. Best Language: 0-->No aphasia, normal  10. Dysarthria: 1-->Mild-to-moderate dysarthria, patient slurs at least some words and, at worst, can be understood with some difficulty  11. Extinction and Inattention (formerly Neglect): 0-->No  "abnormality  Total (NIH Stroke Scale): 1     Modified [place]    [place] Coma Scale:    ABCD2 Score:    [id number] Score:   HAS -BLED Score:   ICH Score:   Hunt & [place] Classification:       Blood pressure 108/64, pulse 90, temperature 97.5 °F (36.4 °C), temperature source Axillary, resp. rate 13, height 5' 9" (1.753 m), weight 72.4 kg (159 lb 9.8 oz), SpO2 (!) 94 %.  Alteplase Eligible?: No  Alteplase Recommendation: Alteplase not recommended due to Full dose anticoagulation   Possible Interventional Revascularization Candidate? No; No large vessel occlusion identified on imaging     Disposition Recommendation: [place] [facility]     Subjective:     History of Present Illness:  82M w/ consult yesterday for left sided weakness worsening from baseline, nausea vomitting and confusion.  /99 mmHg, felt to be more related to HTN. Was admitted and started on BP control and was also noted to be in a.fib w/ RVR.    Placed on heparin gtt w/ a.fib w/ RVR (last PTT 61) , was neurological back to normal all morning and conversational and normal until 130, started repeating himself, confusion, not able to hold a conversation. Converted over to sinus rhythm during this timeline as well      Woke up with symptoms?: no    Recent bleeding noted: no  Does the patient take any Blood Thinners? no  Medications: No relevant medications      Past Medical History:   Past Medical History:   Diagnosis Date    Chronic kidney disease     Fibromyalgia     Fibromyalgia     HSV (herpes simplex virus) anogenital infection     Hypertension     does not take medication    Mental disorder     history of depression       Past Surgical History: no major surgeries within the last 2 weeks    Family History: no relevant history    Social History: no smoking, no drinking, no drugs    Allergies: Cymbalta [Duloxetine]  Iodine And Iodide Containing Products  Latex, Natural Rubber  Asa [Aspirin] No relevant allergies    Review of Systems " "  Constitutional: Negative for appetite change, chills and fever.   HENT: Negative for congestion and sore throat.    Eyes: Negative for discharge and itching.   Respiratory: Negative for apnea and shortness of breath.    Cardiovascular: Negative for chest pain and palpitations.   Gastrointestinal: Positive for nausea. Negative for abdominal pain and anal bleeding.   Endocrine: Negative for cold intolerance and polydipsia.   Genitourinary: Negative for dysuria and hematuria.   Musculoskeletal: Negative for joint swelling and myalgias.   Skin: Negative for color change and rash.   Neurological: Positive for dizziness and numbness. Negative for tremors.   Psychiatric/Behavioral: Negative for hallucinations and self-injury.     Objective:   Vitals: Blood pressure (!) 227/99, pulse 72, temperature 97.7 °F (36.5 °C), temperature source Oral, resp. rate 16, height 5' 5" (1.651 m), weight 72.6 kg (160 lb), SpO2 97 %.     CT READ: Yes  No hemmorhage. No mass effect. No early infarct signs.     Physical Exam  Constitutional:       General: He is not in acute distress.     Appearance: He is well-nourished.   HENT:      Head: Atraumatic.      Right Ear: External ear normal.      Left Ear: External ear normal.   Eyes:      General: No scleral icterus.     Conjunctiva/sclera: Conjunctivae normal.   Pulmonary:      Effort: Pulmonary effort is normal.   Abdominal:      General: There is no distension.      Tenderness: There is no guarding.   Musculoskeletal:         General: No deformity. Normal range of motion.      Cervical back: Normal range of motion.   Skin:     General: Skin is warm and dry.   Neurological:      Mental Status: He is alert.   Psychiatric:         Mood and Affect: Mood and affect normal.           Recommended the emergency room physician to have a brief discussion with the patient and/or family if available regarding the  risks and benefits of treatment, and to briefly document the occurrence of that " discussion in his clinical encounter note.     The attending portion of this evaluation, treatment, and documentation was performed per Carlos Waite MD via audiovisual.    Billing code:  (time dependent stroke, complex case, unstable patient, hemorrhages, any intervention, some mimics)    This patient has a very critical neurological condition/illness, with very high morbidity and mortality.  There is a very high probability for acute neurological change leading to clinical and possibly life-threatening deterioration requiring highest level of physician preparedness for urgent intervention.  There is possibility that this condition will require treatment with high risk medications as quickly as possible.  There is also a possibility that the patient may benefit from further, more advance complex therapies (e.g. endovascular therapy) that will require prompt diagnosis and care.  Care was coordinated with other physicians involved in the patient's care.  Radiologic studies and laboratory data were reviewed and interpreted, and plan of care was re-assessed based on the results.  Diagnosis, treatment options and prognosis may have been discussed with the patient and/or family members or caregiver.  Further advanced medical management and further evaluation is warranted for his care.    In your opinion, this was a: Tier 1 Van Negative    Consult End Time: 2:48 PM     Carlos Waite MD  Comprehensive Stroke Center  Vascular Neurology   [facility]

## 2022-10-21 NOTE — ASSESSMENT & PLAN NOTE
Patient expressed suicidal ideation with plan to shot self with gun that he has at home.  Agree with PEC/CEC and 1:1 sitter.  Seek acute inpatient psychiatric facility when medically cleared.  Unknown psychiatric history but hold off of medications at this time until he is medically cleared.

## 2022-10-21 NOTE — NURSING
"0700 - pt's slumped down in bed, heels resting on foot board, discussed with patient about readjusting in bed to get heels off of footboard; pt replied "I put them there". Pt denies pain to heels but states will move them when he feels like it.   0730 - pt c/o headache pain to back of head, eye pain requesting his eye drops, notified Dr. Platt. States that his stomach is bothering him because of his eye pain and headache then proceeds to vomit. PRN zofran given; pt requesting peptobismol, Dr. Platt notified. Bed linen changed. Pt readjusted and moved up higher in bed. Heels elevated on pillow.   0815 - Pt looking for phone and informed that all of his belongings were taking and locked up with security r/t policy. Pt informed that a phone could be provided but refuses and states "what if someone tries calling me". Pt states that he is dying. Pt informed that he is currently not dying and reason for being admitted to the hospital and how we are treating.   0838 - Dr. Whittaker, Cardiology, at bedside. Plan for rate control and echo today. No cardioversion today r/t pt's nausea/vomiting high risk for aspiration.   0844 -  at bedside. Pt [facility] paper in patient's chart.    1005 - Dr. Platt at bedside.   1045 - pt still c/o nausea and upset stomach that is exacerbated when turning from side to side in bed and requesting Pepto Bismol again; pt informed on order frequency and time of last dose given. Pt given prochlorperazine.   1110 - US tech at bedside performing echo.   1315 - Dr. Anderson at bedside  1325 - 12 lead EKG performed pt in NSR from A fib. Dr. Whittaker notified. Pt repeatedly stating "I don't know who I am" and "Where am I?". Pt told but repeatedly asking same two questions.   1340 - Dr. Platt notified and at bedside. CT Head ordered.   1348 - Code stroke called overhead (see documentation).   1355 - pt to radiology for STAT CT head with RN, Jose Antonio Em RN, and security.   1437 - pt " back to room radiology.   1440 - Telestroke exam completed with Neurologist. Dr. Platt at bedside and discussed with plan with neurologist.  1653 - pt repeatedly trying to get out of bed, unable to be redirected verbally, cursing nurse, security called to bedside; pt assisted back to bed and bilateral soft wrist restraints place. Dr. Platt notified, came to bedside, and PRN agitation medications ordered.

## 2022-10-21 NOTE — CONSULTS
[facility] - Intensive Care  Cardiology  Consult Note    Patient Name: Kierra Daugherty  MRN: [id number]  Admission Date: 10/20/2022  Hospital Length of Stay: 1 days  Code Status: Full Code   Attending Provider: Meredith Platt MD   Consulting Provider: Barry Whittaker MD  Primary Care Physician: Antonieta Khalil MD  Principal Problem:Hypertensive emergency    Patient information was obtained from patient, past medical records and ER records.     Inpatient consult to Cardiology  Consult performed by: Barry Whittaker MD  Consult ordered by: Seamus Portillo MD        Subjective:     Chief Complaint:  Weakness/headache     HPI:   Kierra Daugherty is an 82 y.o. male who presented with complaints of weakness.  Headache.  Blood pressure showed systolics upwards of 220.  Started on Cardene.  Blood pressure improved.  Has since been discontinued.  Patient states that he generally has difficulty controlling his blood pressure.  Longstanding history of hypertension.  History of stroke.  Initial EKG showed normal sinus rhythm. Subsequent EKG showed atrial fibrillation rapid ventricular response.  BNP within normal limits.  Troponin negative.  CT head showed chronic microvascular ischemic changes. Probable remote lacunar infarcts involving the right lateral basal ganglia, bilateral centrum semiovale and left thalamus.  Patient started on heparin drip.  Lopressor for rate control.  Currently complains of headache.  Nauseated.    Echocardiogram 04/29/2022:    CONCLUSIONS     Mild left ventricular hypertrophy.     Hyperdynamic left ventricular systolic function.     Left ventricular ejection fraction is estimated at >65%.     Mild mitral annular calcification.     Aortic valve sclerosis without stenosis.     Mild tricuspid valve regurgitation.     Mild pulmonary valve regurgitation.     Bubble study performed which appeared to be negative at rest and wtih valsalva.     Carotid ultrasound 04/29/2022:    1.   Moderate stenosis of the left  ICA. No high-grade stenosis of the right ICA.   2.   Moderate atherosclerotic plaque of bilateral cervical carotid arterial systems.   3.   Forward flow both vertebral arteries.       MRI brain 04/27/2022:    IMPRESSION:     Tiny acute lacunar infarct within the left frontal horn periventricular white matter propagating into the corona radiata. No acute large territorial infarct.     Background cerebral atrophy, chronic microangiopathy, and multiple old lacunar infarcts.             Past Medical History:   Diagnosis Date    Chronic kidney disease     Fibromyalgia     Fibromyalgia     HSV (herpes simplex virus) anogenital infection     Hypertension     does not take medication    Mental disorder     history of depression       Past Surgical History:   Procedure Laterality Date    APPENDECTOMY      Collapse Lung  1969    GUM SURGERY      HERNIA REPAIR      Left Shoulder      tonsilect      TONSILLECTOMY, ADENOIDECTOMY         Review of patient's allergies indicates:   Allergen Reactions    Cymbalta [duloxetine] Other (See Comments)     Kidney failure    Iodine and iodide containing products Other (See Comments)     Chest, bladder pain    Latex, natural rubber Other (See Comments)     unknown    Asa [aspirin]      No rash, but feels bad when he takes it       No current facility-administered medications on file prior to encounter.     Current Outpatient Medications on File Prior to Encounter   Medication Sig    amLODIPine (NORVASC) 10 MG tablet Take 5 mg by mouth.    cloNIDine 0.2 mg/24 hr td ptwk (CATAPRES) 0.2 mg/24 hr 1 patch every 7 days.    nystatin (MYCOSTATIN) cream Apply topically 2 (two) times daily. Apply to penis twice a day    rosuvastatin (CRESTOR) 40 MG Tab Take 40 mg by mouth every evening.     Family History       Problem Relation (Age of Onset)    Cancer Father, Brother    Heart disease Mother, Brother    Liver disease Sister    Neuropathy Sister    Stroke Mother          Tobacco  Use    Smoking status: Former     Types: Cigarettes     Start date: 1952     Quit date: 4/10/1983     Years since quittin.5    Smokeless tobacco: Never   Substance and Sexual Activity    Alcohol use: No     Comment: Heavy at one time.    Drug use: No    Sexual activity: Never     Review of Systems   Constitutional: Negative for diaphoresis and malaise/fatigue.   Eyes:  Negative for blurred vision, double vision, photophobia, vision loss in left eye, vision loss in right eye, visual disturbance and visual halos.   Cardiovascular:  Positive for irregular heartbeat. Negative for chest pain, dyspnea on exertion, leg swelling, near-syncope, orthopnea, palpitations, paroxysmal nocturnal dyspnea and syncope.   Respiratory:  Negative for shortness of breath, sputum production and wheezing.    Gastrointestinal:  Negative for abdominal pain, heartburn, hematemesis and melena.   Neurological:  Positive for headaches. Negative for dizziness, focal weakness, light-headedness, numbness, paresthesias, seizures and weakness.   Psychiatric/Behavioral:  Negative for hallucinations. The patient is not nervous/anxious.    Objective:     Vital Signs (Most Recent):  Temp: 97.7 °F (36.5 °C) (10/21/22 0715)  Pulse: 93 (10/21/22 09)  Resp: 17 (10/21/22 0900)  BP: (!) 145/69 (10/21/22 0900)  SpO2: (!) 94 % (10/21/22 0900)   Vital Signs (24h Range):  Temp:  [97.7 °F (36.5 °C)-98.5 °F (36.9 °C)] 97.7 °F (36.5 °C)  Pulse:  [] 93  Resp:  [11-34] 17  SpO2:  [92 %-99 %] 94 %  BP: (106-238)/() 145/69     Weight: 72.4 kg (159 lb 9.8 oz)  Body mass index is 23.57 kg/m².    SpO2: (!) 94 %  O2 Device (Oxygen Therapy): room air      Intake/Output Summary (Last 24 hours) at 10/21/2022 0931  Last data filed at 10/21/2022 0736  Gross per 24 hour   Intake 220.67 ml   Output 700 ml   Net -479.33 ml       Lines/Drains/Airways       Peripheral Intravenous Line  Duration                  Peripheral IV - Single Lumen 10/20/22 1623 22  G Anterior;Distal;Right Forearm <1 day         Peripheral IV - Single Lumen 10/20/22 1640 20 G Left Wrist <1 day                    Physical Exam  Vitals reviewed.   Constitutional:       General: He is not in acute distress.     Appearance: He is not diaphoretic.   Neck:      Vascular: No carotid bruit or JVD.   Cardiovascular:      Rate and Rhythm: Normal rate. Rhythm irregularly irregular.      Pulses: Normal pulses.   Pulmonary:      Effort: Pulmonary effort is normal.      Breath sounds: Normal breath sounds.   Abdominal:      General: Bowel sounds are normal.      Palpations: Abdomen is soft.      Tenderness: There is no abdominal tenderness.   Musculoskeletal:      Right lower leg: No edema.      Left lower leg: No edema.   Neurological:      Mental Status: He is alert and oriented to person, place, and time.   Psychiatric:         Speech: Speech normal.         Behavior: Behavior normal.       Significant Labs: CMP   Recent Labs   Lab 10/20/22  1643 10/21/22  0237    140   K 4.8 4.1    108   CO2 21* 23   GLU 96 125*   BUN 27* 20   CREATININE 1.2 1.0   CALCIUM 8.8 8.8   PROT 7.1  --    ALBUMIN 3.9  --    BILITOT 0.3  --    ALKPHOS 75  --    AST 33  --    ALT 22  --    ANIONGAP 11 9   , CBC   Recent Labs   Lab 10/20/22  1643 10/20/22  1643 10/21/22  0237 10/21/22  0506   WBC 5.92  --  8.26 8.21   HGB 14.2  --  14.2 15.0   HCT 40.6   < > 41.2 43.9     --  181 180    < > = values in this interval not displayed.   , and Troponin   Recent Labs   Lab 10/20/22  1643   TROPONINI <0.006       Significant Imaging: EKG: Atrial fibrillation with RVR    Assessment and Plan:     * Hypertensive emergency  10/21/2022: Cardene stopped. Monitor on current regimen.    New onset atrial fibrillation  10/21/2022:  Patient started on heparin drip.  Lopressor for rate control.  Patient did eat this morning.  Currently nauseated.  Will change to Toprol twice daily with hopes of converting to sinus rhythm.  If not  can proceed with cardioversion in a.m. on 10/22/2022.  Would be within 48 hours of onset.  No need for transesophageal echocardiogram.    Mixed hyperlipidemia  10/21/2022: continue statin.        VTE Risk Mitigation (From admission, onward)         Ordered     heparin 25,000 units in dextrose 5% (100 units/ml) IV bolus from bag - ADDITIONAL PRN BOLUS - 60 units/kg  As needed (PRN)        Question:  Heparin Infusion Adjustment (DO NOT MODIFY ANSWER)  Answer:  \[email or web address]\[email or web address]\Images\Pharmacy\HeparinInfusions\heparin LOW INTENSITY nomogram for [facility] [id number]    10/21/22 0454     heparin 25,000 units in dextrose 5% (100 units/ml) IV bolus from bag - ADDITIONAL PRN BOLUS - 30 units/kg  As needed (PRN)        Question:  Heparin Infusion Adjustment (DO NOT MODIFY ANSWER)  Answer:  \[email or web address]\epic\Images\Pharmacy\HeparinInfusions\heparin LOW INTENSITY nomogram for [facility] [id number].pdf    10/21/22 0454     heparin 25,000 units in dextrose 5% 250 mL (100 units/mL) infusion LOW INTENSITY nomogram - [facility]  Continuous        Question Answer Comment   Heparin Infusion Adjustment (DO NOT MODIFY ANSWER) \\[email or web address]\epic\Images\Pharmacy\HeparinInfusions\heparin LOW INTENSITY nomogram for [facility] [id number].pdf    Begin at (in units/kg/hr) 12        10/21/22 0454     IP VTE HIGH RISK PATIENT  Once         10/20/22 2139     Place sequential compression device  Until discontinued         10/20/22 2139              Critical Care Time: 30 minutes     Critical care was time spent personally by me on the following activities: development of treatment plan with patient or surrogate and bedside caregivers, discussions with consultants, evaluation of patient's response to treatment, examination of patient, ordering and performing treatments and interventions, ordering and review of laboratory studies, ordering and review of radiographic studies, pulse oximetry, re-evaluation of patient's condition. This critical care time did not overlap with that of  any other provider or involve time for any procedures.    Thank you for your consult. I will follow-up with patient. Please contact us if you have any additional questions.    Barry Whittaker MD  Cardiology   [facility] [place]

## 2022-10-21 NOTE — HPI
This is an 82 year old male with a PMHx of HTN, CVA, HLD who presented with left sided weakness. He is admitted for HTN emergency.     The patient reports left sided weakness that started the night before presentation. He has baseline weakness from a prior stroke but reports it has gotten worse. Additionally, he endorsed having baseline bilateral feet numbness, nausea, vomiting, confusion, blurry vision and generalized pain. He expressed frustration with his previous medical care and he was not taking any of his home medications. In the ED, the patient was hypertensive (227/99 - MAP:  142), tachypnic but otherwise stable. Labs were largely unremarkable, VBG showed acute hypercapnia with a PH of 7.34, PCO2: 51.8. CT head showed chronic ischemic changes but no acute process. Telestroke was consulted, and thought that his symptoms were related to HTN emergency, and recommended nicardipine for BP, with consideration of repeat imaging/CTA if symptoms worsen. The patient was given zofran, pepcid and was started on a nicardipine drip.

## 2022-10-21 NOTE — CONSULTS
"[facility] - Intensive Care  Psychiatry  Consult Note    Patient Name: Kierra Daugherty  MRN: [id number]   Code Status: Full Code  Admission Date: 10/20/2022  Hospital Length of Stay: 1 days  Attending Physician: Meredith Platt MD  Primary Care Provider: Antonieta Khalil MD    Current Legal Status: Uncontested    Patient information was obtained from patient, ER records and chart review, nursing.   Inpatient consult to Psychiatry  Consult performed by: Shad Anderson MD  Consult ordered by: Seamus Portillo MD        Subjective:     Principal Problem:New onset atrial fibrillation    Chief Complaint:  Altered mental status; suicidal ideation     HPI: Patient Kierra Daugherty presented to the hospital with left sided weakness and admitted for hypertensive emergency.  He also expressed desire to shot himself with a gun that he has at home.  PEC placed and  has come by today to place CEC on chart.  Patient was evaluated in room after awakening but is confused.  He answers most questions with "I am not sure what is going on".  He is oriented to name only and every other question is answered with "I am not sure what is going on".  Unknown psychiatric history or prior self harm.  Chart review shows that his PCP evaluated and diagnosed him with depression in 2018.  At that time, he disclosed to her that he abused himself anally and was making bizarre statements to her.  Nursing reports that patient has been odd, needy, and staring a lot.  He is not able to provide any information about where he lives or if he has family.  Chart review also shows that he did get haloperidol injections on 5/18/21 after some bizarre behavior in the ED, which cleared and was discharged home.      Hospital Course: No notes on file         Patient History               Medical as of 10/21/2022       Past Medical History       Diagnosis Date Comments Source    Chronic kidney disease -- -- Provider    Fibromyalgia -- -- Provider    " Fibromyalgia -- -- Provider    HSV (herpes simplex virus) anogenital infection -- -- Provider    Hypertension -- does not take medication Provider    Mental disorder -- history of depression Provider                          Surgical as of 10/21/2022       Past Surgical History       Procedure Laterality Date Comments Source    Collapse Lung [Other] -- 1969 -- Provider    HERNIA REPAIR -- -- -- Provider    GUM SURGERY -- -- -- Provider    APPENDECTOMY -- -- -- Provider    tonsilect [Other] -- -- -- Provider    Left Shoulder [Other] -- -- -- Provider    TONSILLECTOMY, ADENOIDECTOMY -- -- -- Provider                          Family as of 10/21/2022       Problem Relation Name Age of Onset Comments Source    Stroke Mother -- -- -- Provider    Heart disease Mother -- -- -- Provider    Cancer Father -- -- -- Provider    Liver disease Sister -- -- -- Provider    Neuropathy Sister -- -- -- Provider    Heart disease Brother -- -- -- Provider    Cancer Brother -- -- -- Provider                  Tobacco Use as of 10/21/2022       Smoking Status Smoking Start Date Quit Date Smoking Frequency    Former 11/8/1952 4/10/1983       Smokeless Status Smokeless Type Smokeless Quit Date    Never -- --      Source    Provider                  Alcohol Use as of 10/21/2022       Alcohol Use Drinks/Week Alcohol/Week Comments Source    No   -- Heavy at one time. Provider                  Drug Use as of 10/21/2022       Drug Use Types Frequency Comments Source    No -- -- -- Provider                  Sexual Activity as of 10/21/2022       Sexually Active Birth Control Partners Comments Source    Never -- -- -- Provider                  Activities of Daily Living as of 10/21/2022    None               Social Documentation as of 10/21/2022    None               Occupational as of 10/21/2022    None               Socioeconomic as of 10/21/2022       Marital Status Spouse Name Number of Children Years Education Education Level Preferred Language  Ethnicity Race Source    Single -- -- -- -- English Not  or /a White Provider                  Pertinent History       Question Response Comments    Lives with alone --    Place in Birth Order -- --    Lives in -- --    Number of Siblings -- --    Raised by -- --    Legal Involvement -- --    Childhood Trauma -- --    Criminal History of -- --    Financial Status -- --    Highest Level of Education -- --    Does patient have access to a firearm? -- --     Service -- --    Primary Leisure Activity -- --    Spirituality -- --          Past Medical History:   Diagnosis Date    Chronic kidney disease     Fibromyalgia     Fibromyalgia     HSV (herpes simplex virus) anogenital infection     Hypertension     does not take medication    Mental disorder     history of depression     Past Surgical History:   Procedure Laterality Date    APPENDECTOMY      Collapse Lung  1969    GUM SURGERY      HERNIA REPAIR      Left Shoulder      tonsilect      TONSILLECTOMY, ADENOIDECTOMY       Family History       Problem Relation (Age of Onset)    Cancer Father, Brother    Heart disease Mother, Brother    Liver disease Sister    Neuropathy Sister    Stroke Mother          Tobacco Use    Smoking status: Former     Types: Cigarettes     Start date: 1952     Quit date: 4/10/1983     Years since quittin.5    Smokeless tobacco: Never   Substance and Sexual Activity    Alcohol use: No     Comment: Heavy at one time.    Drug use: No    Sexual activity: Never     Review of patient's allergies indicates:   Allergen Reactions    Cymbalta [duloxetine] Other (See Comments)     Kidney failure    Iodine and iodide containing products Other (See Comments)     Chest, bladder pain    Latex, natural rubber Other (See Comments)     unknown    Asa [aspirin]      No rash, but feels bad when he takes it       No current facility-administered medications on file prior to encounter.     Current Outpatient  "Medications on File Prior to Encounter   Medication Sig    amLODIPine (NORVASC) 10 MG tablet Take 5 mg by mouth.    cloNIDine 0.2 mg/24 hr td ptwk (CATAPRES) 0.2 mg/24 hr 1 patch every 7 days.    nystatin (MYCOSTATIN) cream Apply topically 2 (two) times daily. Apply to penis twice a day    rosuvastatin (CRESTOR) 40 MG Tab Take 40 mg by mouth every evening.     Psychotherapeutics (From admission, onward)      None          Review of Systems   Unable to perform ROS: Mental status change   Strengths and Liabilities: Liability: Patient has no suport network., Liability: Patient has poor health., Liability: Patient has possible cognitive impairment., Liability: Patient lacks coping skills.    Objective:     Vital Signs (Most Recent):  Temp: 97.5 °F (36.4 °C) (10/21/22 1200)  Pulse: 90 (10/21/22 1300)  Resp: 13 (10/21/22 1300)  BP: 108/64 (10/21/22 1300)  SpO2: (!) 94 % (10/21/22 1300)   Vital Signs (24h Range):  Temp:  [97.5 °F (36.4 °C)-98.5 °F (36.9 °C)] 97.5 °F (36.4 °C)  Pulse:  [] 90  Resp:  [11-34] 13  SpO2:  [92 %-99 %] 94 %  BP: (102-238)/() 108/64     Height: 5' 9" (175.3 cm)  Weight: 72.4 kg (159 lb 9.8 oz)  Body mass index is 23.57 kg/m².      Intake/Output Summary (Last 24 hours) at 10/21/2022 1422  Last data filed at 10/21/2022 1200  Gross per 24 hour   Intake 345.67 ml   Output 950 ml   Net -604.33 ml       Physical Exam  Psychiatric:      Comments: EXAMINATION    CONSTITUTIONAL  General Appearance: hospital attire    MUSCULOSKELETAL  Muscle Strength and Tone: normal  Abnormal Involuntary Movements: none noted  Gait and Station: not observed    PSYCHIATRIC MENTAL STATUS EXAM   Level of Consciousness: awake and alert  Orientation: name only  Grooming: limited  Psychomotor Behavior: normal  Speech: soft, normal tone  Language: no abnormalities  Mood: not able to obtain  Affect: confused  Thought Process: perseverative  Associations: not intact  Thought Content: repeating "I don't know what is " "going on"  Memory: poor to recent or remote  Attention: distracted  Fund of Knowledge: poor to situation  Insight: limited into current situation  Judgment: fair into redirection            Significant Labs: All pertinent labs within the past 24 hours have been reviewed.    Significant Imaging: I have reviewed all pertinent imaging results/findings within the past 24 hours.    Assessment/Plan:     Altered mental status  Unknown cause of AMS.  Seems to have a history of periodic altered state, as per chart review.  May or may not have underlying dementia picture.  Can be further assessed after medical clearance.  In the past, haloperidol has worked to calm him down.  Utilize haloperidol 1-2 mg IV every 2 hours PRN acute psychotic or non-redirectable agitation (QTc 408 ms - 10/21/22).    Suicidal ideation  Patient expressed suicidal ideation with plan to shot self with gun that he has at home.  Agree with PEC/CEC and 1:1 sitter.  Seek acute inpatient psychiatric facility when medically cleared.  Unknown psychiatric history but hold off of medications at this time until he is medically cleared.           Total Time:  60 minutes      Shad Anderson MD   Psychiatry  [facility] - [place] - Intensive Care  "

## 2022-10-21 NOTE — HPI
Kierra Daugherty is an 82 y.o. male who presented with complaints of weakness.  Headache.  Blood pressure showed systolics upwards of 220.  Started on Cardene.  Blood pressure improved.  Has since been discontinued.  Patient states that he generally has difficulty controlling his blood pressure.  Longstanding history of hypertension.  History of stroke.  Initial EKG showed normal sinus rhythm. Subsequent EKG showed atrial fibrillation rapid ventricular response.  BNP within normal limits.  Troponin negative.  CT head showed chronic microvascular ischemic changes. Probable remote lacunar infarcts involving the right lateral basal ganglia, bilateral centrum semiovale and left thalamus.  Patient started on heparin drip.  Lopressor for rate control.  Currently complains of headache.  Nauseated.    Echocardiogram 04/29/2022:    CONCLUSIONS     Mild left ventricular hypertrophy.     Hyperdynamic left ventricular systolic function.     Left ventricular ejection fraction is estimated at >65%.     Mild mitral annular calcification.     Aortic valve sclerosis without stenosis.     Mild tricuspid valve regurgitation.     Mild pulmonary valve regurgitation.     Bubble study performed which appeared to be negative at rest and wtih valsalva.     Carotid ultrasound 04/29/2022:    1.   Moderate stenosis of the left ICA. No high-grade stenosis of the right ICA.   2.   Moderate atherosclerotic plaque of bilateral cervical carotid arterial systems.   3.   Forward flow both vertebral arteries.       MRI brain 04/27/2022:    IMPRESSION:     Tiny acute lacunar infarct within the left frontal horn periventricular white matter propagating into the corona radiata. No acute large territorial infarct.     Background cerebral atrophy, chronic microangiopathy, and multiple old lacunar infarcts.

## 2022-10-21 NOTE — PHARMACY MED REC
"Patient states at bedside that he stopped taking his medication.    Admission Medication History     The home medication history was taken by Natalya Espinoza.    You may go to "Admission" then "Reconcile Home Medications" tabs to review and/or act upon these items.     The home medication list has been updated by the Pharmacy department.   Please read ALL comments highlighted in yellow.   Please address this information as you see fit.    Feel free to contact us if you have any questions or require assistance.      Medications listed below were obtained from: Patient/family, Analytic software[employer], and Medical records  (Not in a hospital admission)          Natalya Espinoza  [phone]                 .        "

## 2022-10-21 NOTE — ASSESSMENT & PLAN NOTE
Transient symptoms likely delirium. Mostly repeating phrases. Normal exam currently,  CT/CTA reviewed and are negative for hemorrhage or L/mVO.  At this time would continue heparin gtt and transition to DOAC when appropriate per primary team.  Continue w/ psych consult.

## 2022-10-22 PROBLEM — I77.9 LEFT-SIDED CAROTID ARTERY DISEASE: Status: ACTIVE | Noted: 2022-10-22

## 2022-10-22 PROBLEM — R47.01 APHASIA: Status: RESOLVED | Noted: 2022-10-21 | Resolved: 2022-10-22

## 2022-10-22 PROBLEM — N48.1 BALANITIS: Status: ACTIVE | Noted: 2022-10-22

## 2022-10-22 LAB
ANION GAP SERPL CALC-SCNC: 13 MMOL/L (ref 8–16)
BUN SERPL-MCNC: 27 MG/DL (ref 8–23)
CALCIUM SERPL-MCNC: 9.2 MG/DL (ref 8.7–10.5)
CHLORIDE SERPL-SCNC: 105 MMOL/L (ref 95–110)
CO2 SERPL-SCNC: 21 MMOL/L (ref 23–29)
CREAT SERPL-MCNC: 1.1 MG/DL (ref 0.5–1.4)
EST. GFR  (NO RACE VARIABLE): >60 ML/MIN/1.73 M^2
GLUCOSE SERPL-MCNC: 110 MG/DL (ref 70–110)
POTASSIUM SERPL-SCNC: 3.9 MMOL/L (ref 3.5–5.1)
SODIUM SERPL-SCNC: 139 MMOL/L (ref 136–145)

## 2022-10-22 PROCEDURE — 99900035 HC TECH TIME PER 15 MIN (STAT)

## 2022-10-22 PROCEDURE — 94761 N-INVAS EAR/PLS OXIMETRY MLT: CPT

## 2022-10-22 PROCEDURE — 99291 CRITICAL CARE FIRST HOUR: CPT | Mod: 95,GC,, | Performed by: INTERNAL MEDICINE

## 2022-10-22 PROCEDURE — 25000003 PHARM REV CODE 250: Performed by: HOSPITALIST

## 2022-10-22 PROCEDURE — 63600175 PHARM REV CODE 636 W HCPCS: Performed by: ANESTHESIOLOGY

## 2022-10-22 PROCEDURE — 36415 COLL VENOUS BLD VENIPUNCTURE: CPT | Performed by: HOSPITALIST

## 2022-10-22 PROCEDURE — 25000003 PHARM REV CODE 250: Performed by: INTERNAL MEDICINE

## 2022-10-22 PROCEDURE — 20000000 HC ICU ROOM

## 2022-10-22 PROCEDURE — 99291 PR CRITICAL CARE, E/M 30-74 MINUTES: ICD-10-PCS | Mod: 95,GC,, | Performed by: INTERNAL MEDICINE

## 2022-10-22 PROCEDURE — 25000003 PHARM REV CODE 250: Performed by: STUDENT IN AN ORGANIZED HEALTH CARE EDUCATION/TRAINING PROGRAM

## 2022-10-22 PROCEDURE — 80048 BASIC METABOLIC PNL TOTAL CA: CPT | Performed by: HOSPITALIST

## 2022-10-22 PROCEDURE — 63600175 PHARM REV CODE 636 W HCPCS: Performed by: HOSPITALIST

## 2022-10-22 RX ORDER — HYDRALAZINE HYDROCHLORIDE 20 MG/ML
10 INJECTION INTRAMUSCULAR; INTRAVENOUS EVERY 4 HOURS PRN
Status: DISCONTINUED | OUTPATIENT
Start: 2022-10-22 | End: 2022-10-25 | Stop reason: HOSPADM

## 2022-10-22 RX ORDER — METOPROLOL SUCCINATE 25 MG/1
25 TABLET, EXTENDED RELEASE ORAL 2 TIMES DAILY
Status: DISCONTINUED | OUTPATIENT
Start: 2022-10-22 | End: 2022-10-23

## 2022-10-22 RX ORDER — AMLODIPINE BESYLATE 5 MG/1
10 TABLET ORAL DAILY
Status: DISCONTINUED | OUTPATIENT
Start: 2022-10-23 | End: 2022-10-25 | Stop reason: HOSPADM

## 2022-10-22 RX ORDER — HYDRALAZINE HYDROCHLORIDE 25 MG/1
25 TABLET, FILM COATED ORAL EVERY 8 HOURS PRN
Status: DISCONTINUED | OUTPATIENT
Start: 2022-10-22 | End: 2022-10-25 | Stop reason: HOSPADM

## 2022-10-22 RX ADMIN — Medication 6 MG: at 10:10

## 2022-10-22 RX ADMIN — METOPROLOL SUCCINATE 25 MG: 25 TABLET, EXTENDED RELEASE ORAL at 10:10

## 2022-10-22 RX ADMIN — CLOTRIMAZOLE AND BETAMETHASONE DIPROPIONATE: 10; .5 CREAM TOPICAL at 08:10

## 2022-10-22 RX ADMIN — AMLODIPINE BESYLATE 5 MG: 5 TABLET ORAL at 08:10

## 2022-10-22 RX ADMIN — HALOPERIDOL LACTATE 2 MG: 5 INJECTION, SOLUTION INTRAMUSCULAR at 08:10

## 2022-10-22 RX ADMIN — ATORVASTATIN CALCIUM 40 MG: 40 TABLET, FILM COATED ORAL at 10:10

## 2022-10-22 RX ADMIN — MUPIROCIN: 20 OINTMENT TOPICAL at 08:10

## 2022-10-22 RX ADMIN — HYDRALAZINE HYDROCHLORIDE 10 MG: 20 INJECTION INTRAMUSCULAR; INTRAVENOUS at 08:10

## 2022-10-22 RX ADMIN — APIXABAN 5 MG: 5 TABLET, FILM COATED ORAL at 08:10

## 2022-10-22 RX ADMIN — APIXABAN 5 MG: 5 TABLET, FILM COATED ORAL at 10:10

## 2022-10-22 RX ADMIN — METOPROLOL SUCCINATE 25 MG: 25 TABLET, EXTENDED RELEASE ORAL at 08:10

## 2022-10-22 NOTE — ASSESSMENT & PLAN NOTE
BP uncontrolled on admit with weakness and multiple other complaints. No new stroke on CTH. No signs of CHF. Renal function normal. Previously treated with cardene gtt, now off  - continue metoprolol   - increased amlodipine to 10mg daily   - PRN hydralazine

## 2022-10-22 NOTE — CARE UPDATE
[facility] Intensive Care  ICU Shift Summary  Date: 10/22/2022      Prehospitalization: Home  Admit Date / LOS : 10/20/2022/ 2 days    Diagnosis: New onset atrial fibrillation    Consults:        Active: Cardio, Neuro, Psych, and SW       Needed: N/A     Code Status: Full Code   Advanced Directive: <no information>    LDA:  Lines/Drains/Airways       Peripheral Intravenous Line  Duration                  Peripheral IV - Single Lumen 10/20/22 1623 22 G Anterior;Distal;Right Forearm 1 day         Peripheral IV - Single Lumen 10/21/22 1418 20 G Right Antecubital <1 day                  Central Lines/Site/Justification:Patient Does Not Have Central Line  Urinary Cath/Order/Justification:Patient Does Not Have Urinary Catheter    Vasopressors/Infusions:        GOALS: Volume/ Hemodynamic: N/A                     RASS: N/A    Pain Management: none       Pain Controlled: not applicable     Rhythm: NSR and SB    Respiratory Device: Room Air                  Most Recent SBT/ SAT: N/A       MOVE Screen: pass  ICU Liberation: not applicable    VTE Prophylaxis: Pharm  Mobility: Bedrest  Stress Ulcer Prophylaxis: No    Isolation: No active isolations    Dietary:   Current Diet Order   Procedures    Diet clear liquid [facility]; PEC/CEC - Styrofoam     Order Specific Question:   Indicate patient location for additional diet options:     Answer:   [facility]     Order Specific Question:   Tray type:     Answer:   PEC/CEC - Styrofoam      Tolerance: yes  Advancement: no    I & O (24h):    Intake/Output Summary (Last 24 hours) at 10/22/2022 0509  Last data filed at 10/21/2022 2015  Gross per 24 hour   Intake 461.7 ml   Output 600 ml   Net -138.3 ml        Restraints: Yes    Significant Dates:  Post Op Date: N/A  Rescue Date: N/A  Imaging/ Diagnostics: N/A    Noteworthy Labs:  none    COVID Test: (--)  CBC/Anemia Labs: Coags:    Recent Labs   Lab 10/21/22  0237 10/21/22  0506   WBC 8.26 8.21   HGB 14.2 15.0   HCT 41.2 43.9     180   MCV 92 91   RDW 12.8 12.8    Recent Labs   Lab 10/20/22  1643 10/21/22  0506 10/21/22  1148   INR 0.9 1.0  --    APTT 25.0 25.6 61.1*        Chemistries:   Recent Labs   Lab 10/20/22  1643 10/21/22  0237 10/22/22  0403    140 139   K 4.8 4.1 3.9    108 105   CO2 21* 23 21*   BUN 27* 20 27*   CREATININE 1.2 1.0 1.1   CALCIUM 8.8 8.8 9.2   PROT 7.1  --   --    BILITOT 0.3  --   --    ALKPHOS 75  --   --    ALT 22  --   --    AST 33  --   --    MG  --  2.0  --    PHOS  --  2.5*  --         Cardiac Enzymes: Ejection Fractions:    Recent Labs     10/20/22  1643   TROPONINI <0.006    EF   Date Value Ref Range Status   10/21/2022 55 % Final        POCT Glucose: HbA1c:    Recent Labs   Lab 10/21/22  1349   POCTGLUCOSE 133*    Hemoglobin A1C   Date Value Ref Range Status   04/28/2022 5.6 <5.7 % Final   10/19/2018 5.4 4.0 - 5.6 % Final     Comment:     ADA Screening Guidelines:  5.7-6.4%  Consistent with prediabetes  >or=6.5%  Consistent with diabetes  High levels of fetal hemoglobin interfere with the HbA1C  assay. Heterozygous hemoglobin variants (HbS, HgC, etc)do  not significantly interfere with this assay.   However, presence of multiple variants may affect accuracy.             ICU LOS 1d 6h  Level of Care: OK to Transfer    Chart Check: 24 HR Done  Shift Summary/Plan for the shift: no changes
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
## 2022-10-22 NOTE — CARE UPDATE
[facility] Intensive Care  ICU Shift Summary  Date: 10/22/2022      Prehospitalization: Home  Admit Date / LOS : 10/20/2022/ 2 days    Diagnosis: New onset atrial fibrillation    Consults:        Active: Cardio, Psych, and SW       Needed: N/A     Code Status: Full Code   Advanced Directive: <no information>    LDA:  Lines/Drains/Airways       Peripheral Intravenous Line  Duration                  Peripheral IV - Single Lumen 10/20/22 1623 22 G Anterior;Distal;Right Forearm 1 day         Peripheral IV - Single Lumen 10/21/22 1418 20 G Right Antecubital <1 day                  Central Lines/Site/Justification:Patient Does Not Have Central Line  Urinary Cath/Order/Justification:Patient Does Not Have Urinary Catheter    Vasopressors/Infusions:        GOALS: Volume/ Hemodynamic: N/A                     RASS: N/A    Pain Management: none       Pain Controlled: not applicable     Rhythm: NSR and SB    Respiratory Device: Room Air                  Most Recent SBT/ SAT: N/A       MOVE Screen: PASS  ICU Liberation: not applicable    VTE Prophylaxis: Pharm  Mobility: Bedrest  Stress Ulcer Prophylaxis: No    Isolation: No active isolations    Dietary:   Current Diet Order   Procedures    Diet Cardiac [facility]; PEC/CEC - Styrofoam     Order Specific Question:   Indicate patient location for additional diet options:     Answer:   [facility]     Order Specific Question:   Tray type:     Answer:   PEC/CEC - Styrofoam      Tolerance: yes  Advancement: yes    I & O (24h):    Intake/Output Summary (Last 24 hours) at 10/22/2022 0951  Last data filed at 10/22/2022 0800  Gross per 24 hour   Intake 378.74 ml   Output 575 ml   Net -196.26 ml        Restraints: No    Significant Dates:  Post Op Date: N/A  Rescue Date: N/A  Imaging/ Diagnostics: N/A    Noteworthy Labs:  see below    COVID Test: (--)  CBC/Anemia Labs: Coags:    Recent Labs   Lab 10/21/22  0237 10/21/22  0506   WBC 8.26 8.21   HGB 14.2 15.0   HCT 41.2 43.9  "   180   MCV 92 91   RDW 12.8 12.8    Recent Labs   Lab 10/20/22  1643 10/21/22  0506 10/21/22  1148   INR 0.9 1.0  --    APTT 25.0 25.6 61.1*        Chemistries:   Recent Labs   Lab 10/20/22  1643 10/21/22  0237 10/22/22  0403    140 139   K 4.8 4.1 3.9    108 105   CO2 21* 23 21*   BUN 27* 20 27*   CREATININE 1.2 1.0 1.1   CALCIUM 8.8 8.8 9.2   PROT 7.1  --   --    BILITOT 0.3  --   --    ALKPHOS 75  --   --    ALT 22  --   --    AST 33  --   --    MG  --  2.0  --    PHOS  --  2.5*  --         Cardiac Enzymes: Ejection Fractions:    Recent Labs     10/20/22  1643   TROPONINI <0.006    EF   Date Value Ref Range Status   10/21/2022 55 % Final        POCT Glucose: HbA1c:    Recent Labs   Lab 10/21/22  1349   POCTGLUCOSE 133*    Hemoglobin A1C   Date Value Ref Range Status   04/28/2022 5.6 <5.7 % Final   10/19/2018 5.4 4.0 - 5.6 % Final     Comment:     ADA Screening Guidelines:  5.7-6.4%  Consistent with prediabetes  >or=6.5%  Consistent with diabetes  High levels of fetal hemoglobin interfere with the HbA1C  assay. Heterozygous hemoglobin variants (HbS, HgC, etc)do  not significantly interfere with this assay.   However, presence of multiple variants may affect accuracy.             ICU LOS 1d 11h  Level of Care: OK to Transfer    Chart Check: 12 HR Done  Shift Summary/Plan for the shift: Dr. Whittaker notified of bradycardia, metoprolol dosage decreased. Pt reporting visual hallucination; pt asking the sitter at the bedside if the "children running in hallway are related to her". In addition to asking the sitter if the " lady in the office is related to the sitter because she looks like her". Pt remembers vomiting yesterday, but doesn't recall that happening here at the hospital. Pt does not recall most events from yesterday, or why the events leading to place restraints. Pt redirected and all hospital events explained. Restraints in place, pt able to feed self; pt repeatedly requesting to have " restraints removed. Sitter at the bedside. No c/o pain, headache, N/V today; tolerated clear liquid diet and diet advanced to solids. Restraints removed and pt educated on removal criteria and criteria for reapplying restraints. Pt remains free from injury. Vs and assessment per flow sheet. Pt transferred out of ICU.

## 2022-10-22 NOTE — ASSESSMENT & PLAN NOTE
Patient has new onset atrial fibrillation which is uncontrolled. Patient is currently in atrial fibrillation.    Started metoprolol   Anticoagulation: eliquis  TTE EF 55%, normal diastolic function  Cardiology consulted     [id number] - VASc score:  Congestive Heart Failure or EF < 35% NO   Hypertension YES  +1   Age >= 75 YES  +2   Diabetes Mellitus NO   Stroke/TIA prior history YES  +2   Vascular disease (PAD, MI or Aortic Plaque)? YES +1   Age 64 - 74 NO   Female NO   Total 6

## 2022-10-22 NOTE — PROGRESS NOTES
[facility] Intensive Care  Cardiology  Progress Note    Patient Name: Kierra Daugherty  MRN: [id number]  Admission Date: 10/20/2022  Hospital Length of Stay: 2 days  Code Status: Full Code   Attending Physician: Meredith Platt MD   Primary Care Physician: Antonieta Khalil MD  Expected Discharge Date:   Principal Problem:New onset atrial fibrillation    Subjective:     Hospital Course:   No notes on file    Interval History: Converted to sinus    Review of Systems   Unable to perform ROS: Mental status change   Objective:     Vital Signs (Most Recent):  Temp: 97.6 °F (36.4 °C) (10/22/22 0330)  Pulse: (!) 57 (10/22/22 0700)  Resp: 18 (10/22/22 0700)  BP: (!) 155/70 (10/22/22 0700)  SpO2: (!) 94 % (10/22/22 0700)   Vital Signs (24h Range):  Temp:  [97.5 °F (36.4 °C)-97.7 °F (36.5 °C)] 97.6 °F (36.4 °C)  Pulse:  [] 57  Resp:  [3-61] 18  SpO2:  [90 %-98 %] 94 %  BP: (102-180)/() 155/70     Weight: 72.4 kg (159 lb 9.8 oz)  Body mass index is 23.57 kg/m².     SpO2: (!) 94 %  O2 Device (Oxygen Therapy): room air      Intake/Output Summary (Last 24 hours) at 10/22/2022 0813  Last data filed at 10/21/2022 2015  Gross per 24 hour   Intake 283.74 ml   Output 500 ml   Net -216.26 ml       Lines/Drains/Airways       Peripheral Intravenous Line  Duration                  Peripheral IV - Single Lumen 10/20/22 1623 22 G Anterior;Distal;Right Forearm 1 day         Peripheral IV - Single Lumen 10/21/22 1418 20 G Right Antecubital <1 day                    Physical Exam  Vitals reviewed.   Cardiovascular:      Rate and Rhythm: Regular rhythm.      Pulses: Normal pulses.   Pulmonary:      Effort: Pulmonary effort is normal.      Breath sounds: Normal breath sounds.   Abdominal:      Palpations: Abdomen is soft.      Tenderness: There is no abdominal tenderness.   Musculoskeletal:      Right lower leg: No swelling. No edema.      Left lower leg: No swelling. No edema.   Neurological:      Mental Status: He is alert. He is  disoriented and confused.       Significant Labs: CMP   Recent Labs   Lab 10/20/22  1643 10/21/22  0237 10/22/22  0403    140 139   K 4.8 4.1 3.9    108 105   CO2 21* 23 21*   GLU 96 125* 110   BUN 27* 20 27*   CREATININE 1.2 1.0 1.1   CALCIUM 8.8 8.8 9.2   PROT 7.1  --   --    ALBUMIN 3.9  --   --    BILITOT 0.3  --   --    ALKPHOS 75  --   --    AST 33  --   --    ALT 22  --   --    ANIONGAP 11 9 13   , CBC   Recent Labs   Lab 10/20/22  1643 10/20/22  1643 10/21/22  0237 10/21/22  0506   WBC 5.92  --  8.26 8.21   HGB 14.2  --  14.2 15.0   HCT 40.6   < > 41.2 43.9     --  181 180    < > = values in this interval not displayed.   , and Troponin   Recent Labs   Lab 10/20/22  1643   TROPONINI <0.006       Significant Imaging: Echocardiogram: Transthoracic echo (TTE) complete (Cupid Only):   Results for orders placed or performed during the hospital encounter of 10/20/22   Echo   Result Value Ref Range    BSA 1.88 m2    LA WIDTH 3.60 cm    Left Ventricular Outflow Tract Mean Velocity 0.73 cm/s    Left Ventricular Outflow Tract Mean Gradient 2.24 mmHg    PV PEAK VELOCITY 0.68 cm/s    LVIDd 3.78 3.5 - 6.0 cm    IVS 0.94 0.6 - 1.1 cm    Posterior Wall 0.86 0.6 - 1.1 cm    Ao root annulus 1.30 cm    LVIDs 3.19 2.1 - 4.0 cm    FS 16 28 - 44 %    Sinus 3.13 cm    STJ 1.92 cm    LV mass 100.22 g    LA size 4.60 cm    RVDD 2.63 cm    TAPSE 1.16 cm    Left Ventricle Relative Wall Thickness 0.46 cm    AV mean gradient 4 mmHg    AV valve area 1.16 cm2    AV Velocity Ratio 0.65     AV index (prosthetic) 0.84     MV valve area p 1/2 method 4.50 cm2    E/A ratio 2.31     E wave deceleration time 168.63 msec    LVOT diameter 1.33 cm    LVOT area 1.4 cm2    LVOT peak irwin 0.85 m/s    LVOT peak VTI 17.70 cm    Ao peak irwin 1.31 m/s    Ao VTI 21.1 cm    LVOT stroke volume 24.58 cm3    AV peak gradient 7 mmHg    MV Peak E Irwin 0.97 m/s    TR Max Irwin 2.41 m/s    MV stenosis pressure 1/2 time 48.90 ms    MV Peak A Irwin 0.42  m/s    LV Systolic Volume 40.55 mL    LV Systolic Volume Index 21.6 mL/m2    LV Diastolic Volume 61.13 mL    LV Diastolic Volume Index 32.52 mL/m2    LV Mass Index 53 g/m2    RA Major Axis 5.37 cm    Left Atrium Minor Axis 3.55 cm    Triscuspid Valve Regurgitation Peak Gradient 23 mmHg    RA Width 2.52 cm    Right Atrial Pressure (from IVC) 3 mmHg    EF 55 %    TV rest pulmonary artery pressure 26 mmHg    Narrative    · Technically difficult study.  · Atrial fibrillation observed.  · The left ventricle is normal in size with concentric remodeling and  · The estimated ejection fraction is 55%.  · Normal right ventricular size with normal right ventricular systolic   function.  · Normal central venous pressure (3 mmHg).  · The estimated PA systolic pressure is 26 mmHg.  · There is no pulmonary hypertension.        Assessment and Plan:     Brief HPI:     * New onset atrial fibrillation  10/21/2022:  Patient started on heparin drip.  Lopressor for rate control.  Patient did eat this morning.  Currently nauseated.  Will change to Toprol twice daily with hopes of converting to sinus rhythm.  If not can proceed with cardioversion in a.m. on 10/22/2022.  Would be within 48 hours of onset.  No need for transesophageal echocardiogram.    10/22/2022: converted to sinus. Some heart rates into 50s. Will decrease toprol.    Hypertensive emergency  10/21/2022: Cardene stopped. Monitor on current regimen.    10/22/2022: monitor.    Mixed hyperlipidemia  10/21/2022: continue statin.        VTE Risk Mitigation (From admission, onward)         Ordered     apixaban tablet 5 mg  2 times daily         10/21/22 1802     IP VTE HIGH RISK PATIENT  Once         10/20/22 2139     Place sequential compression device  Until discontinued         10/20/22 2139              Critical Care Time: 30 minutes     Critical care was time spent personally by me on the following activities: development of treatment plan with patient or surrogate and  bedside caregivers, discussions with consultants, evaluation of patient's response to treatment, examination of patient, ordering and performing treatments and interventions, ordering and review of laboratory studies, ordering and review of radiographic studies, pulse oximetry, re-evaluation of patient's condition. This critical care time did not overlap with that of any other provider or involve time for any procedures.    Barry Whittaker MD  Cardiology  [facility] - [place] - Intensive Care

## 2022-10-22 NOTE — SUBJECTIVE & OBJECTIVE
Interval History: Converted to sinus    Review of Systems   Unable to perform ROS: Mental status change   Objective:     Vital Signs (Most Recent):  Temp: 97.6 °F (36.4 °C) (10/22/22 0330)  Pulse: (!) 57 (10/22/22 0700)  Resp: 18 (10/22/22 0700)  BP: (!) 155/70 (10/22/22 0700)  SpO2: (!) 94 % (10/22/22 0700)   Vital Signs (24h Range):  Temp:  [97.5 °F (36.4 °C)-97.7 °F (36.5 °C)] 97.6 °F (36.4 °C)  Pulse:  [] 57  Resp:  [3-61] 18  SpO2:  [90 %-98 %] 94 %  BP: (102-180)/() 155/70     Weight: 72.4 kg (159 lb 9.8 oz)  Body mass index is 23.57 kg/m².     SpO2: (!) 94 %  O2 Device (Oxygen Therapy): room air      Intake/Output Summary (Last 24 hours) at 10/22/2022 0813  Last data filed at 10/21/2022 2015  Gross per 24 hour   Intake 283.74 ml   Output 500 ml   Net -216.26 ml       Lines/Drains/Airways       Peripheral Intravenous Line  Duration                  Peripheral IV - Single Lumen 10/20/22 1623 22 G Anterior;Distal;Right Forearm 1 day         Peripheral IV - Single Lumen 10/21/22 1418 20 G Right Antecubital <1 day                    Physical Exam  Vitals reviewed.   Cardiovascular:      Rate and Rhythm: Regular rhythm.      Pulses: Normal pulses.   Pulmonary:      Effort: Pulmonary effort is normal.      Breath sounds: Normal breath sounds.   Abdominal:      Palpations: Abdomen is soft.      Tenderness: There is no abdominal tenderness.   Musculoskeletal:      Right lower leg: No swelling. No edema.      Left lower leg: No swelling. No edema.   Neurological:      Mental Status: He is alert. He is disoriented and confused.       Significant Labs: CMP   Recent Labs   Lab 10/20/22  1643 10/21/22  0237 10/22/22  0403    140 139   K 4.8 4.1 3.9    108 105   CO2 21* 23 21*   GLU 96 125* 110   BUN 27* 20 27*   CREATININE 1.2 1.0 1.1   CALCIUM 8.8 8.8 9.2   PROT 7.1  --   --    ALBUMIN 3.9  --   --    BILITOT 0.3  --   --    ALKPHOS 75  --   --    AST 33  --   --    ALT 22  --   --    ANIONGAP 11 9  13   , CBC   Recent Labs   Lab 10/20/22  1643 10/20/22  1643 10/21/22  0237 10/21/22  0506   WBC 5.92  --  8.26 8.21   HGB 14.2  --  14.2 15.0   HCT 40.6   < > 41.2 43.9     --  181 180    < > = values in this interval not displayed.   , and Troponin   Recent Labs   Lab 10/20/22  1643   TROPONINI <0.006       Significant Imaging: Echocardiogram: Transthoracic echo (TTE) complete (Cupid Only):   Results for orders placed or performed during the hospital encounter of 10/20/22   Echo   Result Value Ref Range    BSA 1.88 m2    LA WIDTH 3.60 cm    Left Ventricular Outflow Tract Mean Velocity 0.73 cm/s    Left Ventricular Outflow Tract Mean Gradient 2.24 mmHg    PV PEAK VELOCITY 0.68 cm/s    LVIDd 3.78 3.5 - 6.0 cm    IVS 0.94 0.6 - 1.1 cm    Posterior Wall 0.86 0.6 - 1.1 cm    Ao root annulus 1.30 cm    LVIDs 3.19 2.1 - 4.0 cm    FS 16 28 - 44 %    Sinus 3.13 cm    STJ 1.92 cm    LV mass 100.22 g    LA size 4.60 cm    RVDD 2.63 cm    TAPSE 1.16 cm    Left Ventricle Relative Wall Thickness 0.46 cm    AV mean gradient 4 mmHg    AV valve area 1.16 cm2    AV Velocity Ratio 0.65     AV index (prosthetic) 0.84     MV valve area p 1/2 method 4.50 cm2    E/A ratio 2.31     E wave deceleration time 168.63 msec    LVOT diameter 1.33 cm    LVOT area 1.4 cm2    LVOT peak irwin 0.85 m/s    LVOT peak VTI 17.70 cm    Ao peak irwin 1.31 m/s    Ao VTI 21.1 cm    LVOT stroke volume 24.58 cm3    AV peak gradient 7 mmHg    MV Peak E Irwin 0.97 m/s    TR Max Irwin 2.41 m/s    MV stenosis pressure 1/2 time 48.90 ms    MV Peak A Irwin 0.42 m/s    LV Systolic Volume 40.55 mL    LV Systolic Volume Index 21.6 mL/m2    LV Diastolic Volume 61.13 mL    LV Diastolic Volume Index 32.52 mL/m2    LV Mass Index 53 g/m2    RA Major Axis 5.37 cm    Left Atrium Minor Axis 3.55 cm    Triscuspid Valve Regurgitation Peak Gradient 23 mmHg    RA Width 2.52 cm    Right Atrial Pressure (from IVC) 3 mmHg    EF 55 %    TV rest pulmonary artery pressure 26 mmHg     Narrative    · Technically difficult study.  · Atrial fibrillation observed.  · The left ventricle is normal in size with concentric remodeling and  · The estimated ejection fraction is 55%.  · Normal right ventricular size with normal right ventricular systolic   function.  · Normal central venous pressure (3 mmHg).  · The estimated PA systolic pressure is 26 mmHg.  · There is no pulmonary hypertension.

## 2022-10-22 NOTE — ASSESSMENT & PLAN NOTE
Suspect delirium   CTH chronic microvascular disease and remote lacunar infarcts. At risk for vascular dementia

## 2022-10-22 NOTE — ASSESSMENT & PLAN NOTE
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
## 2022-10-22 NOTE — PLAN OF CARE
Problem: Adult Inpatient Plan of Care  Goal: Plan of Care Review  10/22/2022 1744 by Briana Bansal RN  Outcome: Ongoing, Progressing  10/22/2022 1743 by Briana Bansal RN  Outcome: Unable to Meet, Plan Revised  Goal: Patient-Specific Goal (Individualized)  10/22/2022 1744 by Briana Bansal RN  Outcome: Ongoing, Progressing  10/22/2022 1743 by Briana Bansal RN  Outcome: Unable to Meet, Plan Revised  Goal: Absence of Hospital-Acquired Illness or Injury  10/22/2022 1744 by Briana Bansal RN  Outcome: Ongoing, Progressing  10/22/2022 1743 by Briana Bansal RN  Outcome: Unable to Meet, Plan Revised  Goal: Optimal Comfort and Wellbeing  10/22/2022 1744 by Briana Bansal RN  Outcome: Ongoing, Progressing  10/22/2022 1743 by Briana Bansal RN  Outcome: Unable to Meet, Plan Revised  Goal: Readiness for Transition of Care  10/22/2022 1744 by Briana Bansal RN  Outcome: Ongoing, Progressing  10/22/2022 1743 by Briana Bansal RN  Outcome: Unable to Meet, Plan Revised     Problem: Violence Risk or Actual  Goal: Anger and Impulse Control  10/22/2022 1744 by Briana Bansal RN  Outcome: Ongoing, Progressing  10/22/2022 1743 by Briana Bansal RN  Outcome: Unable to Meet, Plan Revised     Problem: Fall Injury Risk  Goal: Absence of Fall and Fall-Related Injury  10/22/2022 1744 by Briana Bansal RN  Outcome: Ongoing, Progressing  10/22/2022 1743 by Briana Bansal RN  Outcome: Unable to Meet, Plan Revised     Problem: Skin Injury Risk Increased  Goal: Skin Health and Integrity  10/22/2022 1744 by Briana Bansal RN  Outcome: Ongoing, Progressing  10/22/2022 1743 by Briana Bansal RN  Outcome: Unable to Meet, Plan Revised     Problem: Nausea and Vomiting  Goal: Fluid and Electrolyte Balance  10/22/2022 1744 by Briana Bansal RN  Outcome: Ongoing, Progressing  10/22/2022 1743 by Briana Bansal RN  Outcome: Unable to Meet, Plan Revised

## 2022-10-22 NOTE — NURSING
[facility]  Nurses Note -- 4 Eyes      10/22/2022       Skin assessed on: Q Shift      [x] No Pressure Injuries Present    [x]Prevention Measures Documented    [] Yes LDA  for Pressure Injury Previously documented     [] Yes New Pressure Injury Discovered   [] LDA for New Pressure Injury Added      Attending RN:  Briana Bansal RN     Second RN:  Jose Antonio Em RN

## 2022-10-22 NOTE — ASSESSMENT & PLAN NOTE
10/21/2022:  Patient started on heparin drip.  Lopressor for rate control.  Patient did eat this morning.  Currently nauseated.  Will change to Toprol twice daily with hopes of converting to sinus rhythm.  If not can proceed with cardioversion in a.m. on 10/22/2022.  Would be within 48 hours of onset.  No need for transesophageal echocardiogram.    10/22/2022: converted to sinus. Some heart rates into 50s. Will decrease toprol.
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
## 2022-10-23 LAB
ANION GAP SERPL CALC-SCNC: 12 MMOL/L (ref 8–16)
BUN SERPL-MCNC: 30 MG/DL (ref 8–23)
CALCIUM SERPL-MCNC: 9.2 MG/DL (ref 8.7–10.5)
CHLORIDE SERPL-SCNC: 106 MMOL/L (ref 95–110)
CO2 SERPL-SCNC: 21 MMOL/L (ref 23–29)
CREAT SERPL-MCNC: 1 MG/DL (ref 0.5–1.4)
EST. GFR  (NO RACE VARIABLE): >60 ML/MIN/1.73 M^2
GLUCOSE SERPL-MCNC: 105 MG/DL (ref 70–110)
POTASSIUM SERPL-SCNC: 3.6 MMOL/L (ref 3.5–5.1)
SODIUM SERPL-SCNC: 139 MMOL/L (ref 136–145)

## 2022-10-23 PROCEDURE — 11000001 HC ACUTE MED/SURG PRIVATE ROOM

## 2022-10-23 PROCEDURE — 25000003 PHARM REV CODE 250: Performed by: HOSPITALIST

## 2022-10-23 PROCEDURE — 99291 CRITICAL CARE FIRST HOUR: CPT | Mod: 95,GC,, | Performed by: INTERNAL MEDICINE

## 2022-10-23 PROCEDURE — 99291 PR CRITICAL CARE, E/M 30-74 MINUTES: ICD-10-PCS | Mod: 95,GC,, | Performed by: INTERNAL MEDICINE

## 2022-10-23 PROCEDURE — 80048 BASIC METABOLIC PNL TOTAL CA: CPT | Performed by: HOSPITALIST

## 2022-10-23 PROCEDURE — 63600175 PHARM REV CODE 636 W HCPCS: Performed by: ANESTHESIOLOGY

## 2022-10-23 PROCEDURE — 36415 COLL VENOUS BLD VENIPUNCTURE: CPT | Performed by: HOSPITALIST

## 2022-10-23 RX ORDER — METOPROLOL SUCCINATE 25 MG/1
25 TABLET, EXTENDED RELEASE ORAL 2 TIMES DAILY
Qty: 180 TABLET | Refills: 3 | Status: SHIPPED | OUTPATIENT
Start: 2022-10-23 | End: 2022-10-23 | Stop reason: SDUPTHER

## 2022-10-23 RX ORDER — HYDROCHLOROTHIAZIDE 25 MG/1
25 TABLET ORAL DAILY
Status: DISCONTINUED | OUTPATIENT
Start: 2022-10-23 | End: 2022-10-23

## 2022-10-23 RX ORDER — METOPROLOL SUCCINATE 50 MG/1
50 TABLET, EXTENDED RELEASE ORAL 2 TIMES DAILY
Qty: 180 TABLET | Refills: 3 | Status: ON HOLD | OUTPATIENT
Start: 2022-10-23 | End: 2023-01-31 | Stop reason: HOSPADM

## 2022-10-23 RX ORDER — METOPROLOL TARTRATE 1 MG/ML
5 INJECTION, SOLUTION INTRAVENOUS EVERY 5 MIN PRN
Status: CANCELLED | OUTPATIENT
Start: 2022-10-23

## 2022-10-23 RX ORDER — DILTIAZEM HYDROCHLORIDE 5 MG/ML
15 INJECTION INTRAVENOUS ONCE
Status: COMPLETED | OUTPATIENT
Start: 2022-10-23 | End: 2022-10-23

## 2022-10-23 RX ORDER — METOPROLOL SUCCINATE 50 MG/1
50 TABLET, EXTENDED RELEASE ORAL 2 TIMES DAILY
Status: DISCONTINUED | OUTPATIENT
Start: 2022-10-23 | End: 2022-10-25 | Stop reason: HOSPADM

## 2022-10-23 RX ORDER — METOPROLOL SUCCINATE 50 MG/1
50 TABLET, EXTENDED RELEASE ORAL 2 TIMES DAILY
Status: DISCONTINUED | OUTPATIENT
Start: 2022-10-23 | End: 2022-10-23

## 2022-10-23 RX ORDER — METOPROLOL TARTRATE 1 MG/ML
5 INJECTION, SOLUTION INTRAVENOUS
Status: DISCONTINUED | OUTPATIENT
Start: 2022-10-23 | End: 2022-10-25 | Stop reason: HOSPADM

## 2022-10-23 RX ORDER — AMLODIPINE BESYLATE 10 MG/1
10 TABLET ORAL DAILY
Qty: 90 TABLET | Refills: 3 | Status: SHIPPED | OUTPATIENT
Start: 2022-10-23 | End: 2023-01-29 | Stop reason: SDUPTHER

## 2022-10-23 RX ORDER — ROSUVASTATIN CALCIUM 40 MG/1
40 TABLET, COATED ORAL NIGHTLY
Qty: 90 TABLET | Refills: 3 | Status: SHIPPED | OUTPATIENT
Start: 2022-10-23 | End: 2023-10-23

## 2022-10-23 RX ORDER — METOPROLOL TARTRATE 1 MG/ML
5 INJECTION, SOLUTION INTRAVENOUS EVERY 5 MIN PRN
Status: DISCONTINUED | OUTPATIENT
Start: 2022-10-23 | End: 2022-10-23

## 2022-10-23 RX ORDER — HYDROCHLOROTHIAZIDE 25 MG/1
25 TABLET ORAL DAILY
Qty: 90 TABLET | Refills: 3 | Status: SHIPPED | OUTPATIENT
Start: 2022-10-23 | End: 2022-10-23 | Stop reason: HOSPADM

## 2022-10-23 RX ADMIN — METOPROLOL SUCCINATE 50 MG: 50 TABLET, EXTENDED RELEASE ORAL at 02:10

## 2022-10-23 RX ADMIN — DILTIAZEM HYDROCHLORIDE 15 MG: 5 INJECTION INTRAVENOUS at 01:10

## 2022-10-23 RX ADMIN — METOROPROLOL TARTRATE 5 MG: 5 INJECTION, SOLUTION INTRAVENOUS at 06:10

## 2022-10-23 RX ADMIN — METOROPROLOL TARTRATE 5 MG: 5 INJECTION, SOLUTION INTRAVENOUS at 03:10

## 2022-10-23 RX ADMIN — METOPROLOL SUCCINATE 50 MG: 50 TABLET, EXTENDED RELEASE ORAL at 09:10

## 2022-10-23 RX ADMIN — HYDRALAZINE HYDROCHLORIDE 10 MG: 20 INJECTION INTRAMUSCULAR; INTRAVENOUS at 11:10

## 2022-10-23 RX ADMIN — METOROPROLOL TARTRATE 5 MG: 5 INJECTION, SOLUTION INTRAVENOUS at 02:10

## 2022-10-23 NOTE — NURSING
Pt refusing to take all oral medications. Pt stated the medications made him crazy last night. Pt educated on importance of taking cardiac medications, pt still refusing and being verbally aggressive with RN. Pt's 's, pt refusing to take PRN Hydralazine PO. Called eICU for IV push, awaiting orders.

## 2022-10-23 NOTE — PLAN OF CARE
Problem: Adult Inpatient Plan of Care  Goal: Plan of Care Review  Outcome: Ongoing, Progressing  Goal: Patient-Specific Goal (Individualized)  Outcome: Ongoing, Progressing  Goal: Absence of Hospital-Acquired Illness or Injury  Outcome: Ongoing, Progressing  Goal: Optimal Comfort and Wellbeing  Outcome: Ongoing, Progressing  Goal: Readiness for Transition of Care  Outcome: Ongoing, Progressing     Problem: Violence Risk or Actual  Goal: Anger and Impulse Control  Outcome: Ongoing, Progressing     Problem: Fall Injury Risk  Goal: Absence of Fall and Fall-Related Injury  Outcome: Ongoing, Progressing     Problem: Skin Injury Risk Increased  Goal: Skin Health and Integrity  Outcome: Ongoing, Progressing     Problem: Nausea and Vomiting  Goal: Fluid and Electrolyte Balance  Outcome: Ongoing, Progressing

## 2022-10-23 NOTE — PLAN OF CARE
10/23/22 0849   Post-Acute Status   Post-Acute Authorization Placement   Post-Acute Placement Status Referrals Sent   Discharge Plan   [facility]charge Plan [facility]   ADT 32 placed in [facility].  Awaiting Psyc placement.

## 2022-10-23 NOTE — NURSING
Pt went into a-fib RVR HR in the 120s-130s on monitor. Dr. Platt notified. Diltiazem ordered. Rapid ICU nurse came to floor to push med. (5230) . Pt still a-fib  ranging 70s- 90s. MD aware.

## 2022-10-23 NOTE — ASSESSMENT & PLAN NOTE
10/21/2022:  Patient started on heparin drip.  Lopressor for rate control.  Patient did eat this morning.  Currently nauseated.  Will change to Toprol twice daily with hopes of converting to sinus rhythm.  If not can proceed with cardioversion in a.m. on 10/22/2022.  Would be within 48 hours of onset.  No need for transesophageal echocardiogram.    10/22/2022: converted to sinus. Some heart rates into 50s. Will decrease toprol.    1023/2022:  Maintaining sinus rhythm.  Continue rate control and anticoagulation.

## 2022-10-23 NOTE — ASSESSMENT & PLAN NOTE
BP uncontrolled on admit with weakness and multiple other complaints. No new stroke on CTH. No signs of CHF. Renal function normal. Previously treated with cardene gtt, now off  - continue metoprolol   - increased amlodipine to 10mg daily. Added HCTZ 25mg daily   - PRN hydralazine

## 2022-10-23 NOTE — PLAN OF CARE
[place] [facility]  Discharge Final Note    Primary Care Provider: Antoineta Khalil MD    Expected Discharge Date: 10/23/2022    Final Discharge Note (most recent)       Final Note - 10/23/22 1232          Final Note    Assessment Type Final Discharge Note     Anticipated Discharge Disposition Home or Self Care     Hospital Resources/Appts/Education Provided Appointments scheduled and added to AVS        Post-Acute Status    Post-Acute Authorization Other     Other Status No Post-Acute Service Needs     Discharge Delays None known at this time                     Important Message from Medicare             Contact Info       Antonieta Khalil MD   Specialty: Internal Medicine, Wound Care   Relationship: PCP - General    [address] [place] [address]  [address]  [place] [id number]   Phone: [phone]       Next Steps: Follow up

## 2022-10-23 NOTE — NURSING TRANSFER
Nursing Transfer Note      10/23/2022     Reason patient is being transferred: downgrade from ICU    Transfer From: 260    Transfer via bed    Transfer with cardiac monitoring    Transported by Jose Antonio guerra RN, and Manan bray    Medicines sent: bactroban and ointment    Any special needs or follow-up needed: attempt AM medications & lab draw    Chart send with patient: Yes    Notified: nephew    Patient reassessed at: 10/23/2022 0800 (date, time)    Upon arrival to floor: cardiac monitor applied, patient oriented to room, and bed in lowest position    0905 - Report given to Charmaine, med-surg LPN

## 2022-10-23 NOTE — PLAN OF CARE
10/23/22 1100   Medicare Message   Important Message from Medicare regarding Discharge Appeal Rights Given to patient/caregiver;Explained to patient/caregiver;Signed/date by patient/caregiver   Date IMM was signed 10/23/22   Time IMM was signed 1100

## 2022-10-23 NOTE — SUBJECTIVE & OBJECTIVE
Interval History: maintaining sinus    Review of Systems   Constitutional: Negative for diaphoresis and malaise/fatigue.   Cardiovascular:  Negative for chest pain, dyspnea on exertion, irregular heartbeat, leg swelling, near-syncope, orthopnea, palpitations, paroxysmal nocturnal dyspnea and syncope.   Respiratory:  Negative for shortness of breath, sputum production and wheezing.    Gastrointestinal:  Negative for abdominal pain, heartburn, hematemesis and melena.   Neurological:  Negative for dizziness, focal weakness, light-headedness, numbness, paresthesias, seizures and weakness.   Objective:     Vital Signs (Most Recent):  Temp: 97.8 °F (36.6 °C) (10/23/22 0753)  Pulse: 63 (10/23/22 0753)  Resp: (!) 21 (10/23/22 0753)  BP: (!) 176/78 (10/23/22 0753)  SpO2: 96 % (10/23/22 0753)   Vital Signs (24h Range):  Temp:  [97.6 °F (36.4 °C)-98.1 °F (36.7 °C)] 97.8 °F (36.6 °C)  Pulse:  [53-79] 63  Resp:  [2-33] 21  SpO2:  [92 %-98 %] 96 %  BP: (117-189)/() 176/78     Weight: 72.4 kg (159 lb 9.8 oz)  Body mass index is 23.57 kg/m².     SpO2: 96 %  O2 Device (Oxygen Therapy): room air      Intake/Output Summary (Last 24 hours) at 10/23/2022 1045  Last data filed at 10/23/2022 0800  Gross per 24 hour   Intake 360 ml   Output 900 ml   Net -540 ml       Lines/Drains/Airways       Peripheral Intravenous Line  Duration                  Peripheral IV - Single Lumen 10/20/22 1623 22 G Anterior;Distal;Right Forearm 2 days         Peripheral IV - Single Lumen 10/21/22 1418 20 G Right Antecubital 1 day                    Physical Exam  Vitals reviewed.   Constitutional:       General: He is not in acute distress.     Appearance: He is not diaphoretic.   Neck:      Vascular: No carotid bruit or JVD.   Cardiovascular:      Rate and Rhythm: Normal rate and regular rhythm.      Pulses: Normal pulses.   Pulmonary:      Effort: Pulmonary effort is normal.      Breath sounds: Normal breath sounds.   Abdominal:      General: Bowel  sounds are normal.      Palpations: Abdomen is soft.      Tenderness: There is no abdominal tenderness.   Musculoskeletal:      Right lower leg: No edema.      Left lower leg: No edema.   Neurological:      Mental Status: He is alert and oriented to person, place, and time.   Psychiatric:         Speech: Speech normal.         Behavior: Behavior normal.       Significant Labs: CMP   Recent Labs   Lab 10/22/22  0403      K 3.9      CO2 21*      BUN 27*   CREATININE 1.1   CALCIUM 9.2   ANIONGAP 13    and CBC No results for input(s): WBC, HGB, HCT, PLT in the last 48 hours.    Significant Imaging: Echocardiogram: Transthoracic echo (TTE) complete (Cupid Only):   Results for orders placed or performed during the hospital encounter of 10/20/22   Echo   Result Value Ref Range    BSA 1.88 m2    LA WIDTH 3.60 cm    Left Ventricular Outflow Tract Mean Velocity 0.73 cm/s    Left Ventricular Outflow Tract Mean Gradient 2.24 mmHg    PV PEAK VELOCITY 0.68 cm/s    LVIDd 3.78 3.5 - 6.0 cm    IVS 0.94 0.6 - 1.1 cm    Posterior Wall 0.86 0.6 - 1.1 cm    Ao root annulus 1.30 cm    LVIDs 3.19 2.1 - 4.0 cm    FS 16 28 - 44 %    Sinus 3.13 cm    STJ 1.92 cm    LV mass 100.22 g    LA size 4.60 cm    RVDD 2.63 cm    TAPSE 1.16 cm    Left Ventricle Relative Wall Thickness 0.46 cm    AV mean gradient 4 mmHg    AV valve area 1.16 cm2    AV Velocity Ratio 0.65     AV index (prosthetic) 0.84     MV valve area p 1/2 method 4.50 cm2    E/A ratio 2.31     E wave deceleration time 168.63 msec    LVOT diameter 1.33 cm    LVOT area 1.4 cm2    LVOT peak irwin 0.85 m/s    LVOT peak VTI 17.70 cm    Ao peak irwin 1.31 m/s    Ao VTI 21.1 cm    LVOT stroke volume 24.58 cm3    AV peak gradient 7 mmHg    MV Peak E Irwin 0.97 m/s    TR Max Irwin 2.41 m/s    MV stenosis pressure 1/2 time 48.90 ms    MV Peak A Irwin 0.42 m/s    LV Systolic Volume 40.55 mL    LV Systolic Volume Index 21.6 mL/m2    LV Diastolic Volume 61.13 mL    LV Diastolic Volume  Index 32.52 mL/m2    LV Mass Index 53 g/m2    RA Major Axis 5.37 cm    Left Atrium Minor Axis 3.55 cm    Triscuspid Valve Regurgitation Peak Gradient 23 mmHg    RA Width 2.52 cm    Right Atrial Pressure (from IVC) 3 mmHg    EF 55 %    TV rest pulmonary artery pressure 26 mmHg    Narrative    · Technically difficult study.  · Atrial fibrillation observed.  · The left ventricle is normal in size with concentric remodeling and  · The estimated ejection fraction is 55%.  · Normal right ventricular size with normal right ventricular systolic   function.  · Normal central venous pressure (3 mmHg).  · The estimated PA systolic pressure is 26 mmHg.  · There is no pulmonary hypertension.

## 2022-10-23 NOTE — ASSESSMENT & PLAN NOTE
Patient has new onset atrial fibrillation which is uncontrolled. Patient is currently in atrial fibrillation.    Started metoprolol   Anticoagulation: eliquis  TTE EF 55%, normal diastolic function  Cardiology consulted     [id number] - VASc score:  Congestive Heart Failure or EF < 35% NO   Hypertension YES  +1   Age >= 75 YES  +2   Diabetes Mellitus NO   Stroke/TIA prior history YES  +2   Vascular disease (PAD, MI or Aortic Plaque)? YES +1   Age 64 - 74 NO   Female NO   Total 6

## 2022-10-23 NOTE — SUBJECTIVE & OBJECTIVE
Interval History: Says he had a horrible night because he was cold. He says his medications are making him cold. He makes racist remarks towards staff.     Review of Systems   Constitutional:  Negative for chills and fever.   Respiratory:  Negative for cough and wheezing.    Cardiovascular:  Negative for chest pain, palpitations and leg swelling.   Gastrointestinal:  Negative for abdominal pain, constipation, diarrhea, nausea and vomiting.   Genitourinary:  Negative for difficulty urinating.   Musculoskeletal:  Negative for arthralgias and myalgias.   Skin:  Negative for rash.   Neurological:  Negative for weakness and numbness.   Psychiatric/Behavioral:  Negative for agitation and confusion.    Objective:     Vital Signs (Most Recent):  Temp: 97.8 °F (36.6 °C) (10/23/22 0753)  Pulse: 63 (10/23/22 0753)  Resp: (!) 21 (10/23/22 0753)  BP: (!) 176/78 (10/23/22 0753)  SpO2: 96 % (10/23/22 0753)   Vital Signs (24h Range):  Temp:  [97.6 °F (36.4 °C)-98.1 °F (36.7 °C)] 97.8 °F (36.6 °C)  Pulse:  [53-79] 63  Resp:  [2-33] 21  SpO2:  [92 %-98 %] 96 %  BP: (117-189)/() 176/78     Weight: 72.4 kg (159 lb 9.8 oz)  Body mass index is 23.57 kg/m².    Intake/Output Summary (Last 24 hours) at 10/23/2022 0922  Last data filed at 10/23/2022 0800  Gross per 24 hour   Intake 360 ml   Output 900 ml   Net -540 ml        Physical Exam  Vitals and nursing note reviewed.   Constitutional:       General: He is not in acute distress.     Appearance: He is not ill-appearing, toxic-appearing or diaphoretic.   HENT:      Head: Normocephalic and atraumatic.      Nose: Nose normal.      Mouth/Throat:      Mouth: Mucous membranes are moist.   Eyes:      General: No scleral icterus.        Right eye: No discharge.         Left eye: No discharge.      Conjunctiva/sclera: Conjunctivae normal.   Cardiovascular:      Rate and Rhythm: Normal rate and regular rhythm.      Pulses: Normal pulses.      Heart sounds: Normal heart sounds. No murmur  heard.    No gallop.      Comments: NSR  Pulmonary:      Effort: Pulmonary effort is normal. No respiratory distress.      Breath sounds: Normal breath sounds. No wheezing or rales.      Comments: Room air  Abdominal:      General: Bowel sounds are normal. There is no distension.      Palpations: Abdomen is soft. There is no mass.      Tenderness: There is no abdominal tenderness. There is no guarding.   Musculoskeletal:         General: No swelling or tenderness.      Right lower leg: No edema.      Left lower leg: No edema.   Skin:     General: Skin is warm and dry.      Coloration: Skin is not jaundiced.      Findings: No bruising, erythema, lesion or rash.   Neurological:      Mental Status: He is alert and oriented to person, place, and time.       Significant Labs: All pertinent labs within the past 24 hours have been reviewed.    Significant Imaging: I have reviewed all pertinent imaging results/findings within the past 24 hours.

## 2022-10-23 NOTE — NURSING
Pt running afib RVR again 110-130s. Another metoprolol IV push given by charge RN . Pt not 60-80s on monitor.

## 2022-10-23 NOTE — DISCHARGE SUMMARY
[facility] Medicine  Discharge Summary      Patient Name: Kierra Daugherty  MRN: [id number]  Patient Class: IP- Inpatient  Admission Date: 10/20/2022  Hospital Length of Stay: 3 days  Discharge Date and Time:  10/23/2022 9:28 AM  Attending Physician: Meredith Platt MD   Discharging Provider: Meredith Platt MD  Primary Care Provider: Antonieta Khalil MD      HPI:   This is an 82 year old male with a PMHx of HTN, CVA, HLD who presented with left sided weakness. He is admitted for HTN emergency.     The patient reports left sided weakness that started the night before presentation. He has baseline weakness from a prior stroke but reports it has gotten worse. Additionally, he endorsed having baseline bilateral feet numbness, nausea, vomiting, confusion, blurry vision and generalized pain. He expressed frustration with his previous medical care and he was not taking any of his home medications. In the ED, the patient was hypertensive (227/99 - MAP:  142), tachypnic but otherwise stable. Labs were largely unremarkable, VBG showed acute hypercapnia with a PH of 7.34, PCO2: 51.8. CT head showed chronic ischemic changes but no acute process. Telestroke was consulted, and thought that his symptoms were related to HTN emergency, and recommended nicardipine for BP, with consideration of repeat imaging/CTA if symptoms worsen. The patient was given zofran, pepcid and was started on a nicardipine drip.       * No surgery found *      Hospital Course:   Mr Kierra Daugherty was admitted with hypertensive emergency. Started cardene gtt. BP controlled with change in medications- now on amlodipine, metoprolol, and HCTZ. Then developed Afib RVR. Continued BB and started eliquis. Cardiology consulted. He endorsed suicidal ideation. He was PEC'd and Psych consulted. He had delirium. CTH was performed on 2 occasions for confusion, both times negative for acute infarct. CTA did show 80-95% narrowing of proximal L  ICA. He is stable for discharge to inpatient psych placement.        Goals of Care Treatment Preferences:  Code Status: Full Code      Consults:   Consults (From admission, onward)        Status Ordering Provider     Inpatient consult to Cardiology  Once        Provider:  Barry Whittaker MD    Completed REGINA PULIDO     IP consult to case management  Once        Provider:  (Not yet assigned)    Completed ARIS MONTAÑO     Inpatient consult to Psychiatry  Once        Provider:  Shad Anderson MD    Completed REGINA PULIDO     Consult to Telemedicine - Acute Stroke  Once        Provider:  Carlos Waite MD    Completed FLAKITO SERNA          No new Assessment & Plan notes have been filed under this hospital service since the last note was generated.  Service: Hospital Medicine    Final Active Diagnoses:    Diagnosis Date Noted POA    PRINCIPAL PROBLEM:  New onset atrial fibrillation [I48.91] 10/21/2022 No    Left-sided carotid artery disease [I77.9] 10/22/2022 Yes    Balanitis [N48.1] 10/22/2022 Yes    Suicidal ideation [R45.851] 10/21/2022 Not Applicable    Altered mental status [R41.82] 10/21/2022 Yes    Hypertensive emergency [I16.1] 10/20/2022 Yes    History of CVA (cerebrovascular accident) [Z86.73] 10/20/2022 Not Applicable    Mixed hyperlipidemia [E78.2] 10/21/2018 Yes      Problems Resolved During this Admission:    Diagnosis Date Noted Date Resolved POA    Aphasia [R47.01] 10/21/2022 10/22/2022 Yes       Discharged Condition: good    Disposition: Psychiatric Hospital    Follow Up: with PCP    Patient Instructions:      Ambulatory referral/consult to Vascular Surgery   Standing Status: Future   Referral Priority: Routine Referral Type: Consultation   Referral Reason: Specialty Services Required   Requested Specialty: Vascular Surgery   Number of Visits Requested: 1     Diet Cardiac     Notify your health care provider if you experience any of the following:  temperature >100.4     Notify  your health care provider if you experience any of the following:  persistent nausea and vomiting or diarrhea     Notify your health care provider if you experience any of the following:  severe uncontrolled pain     Notify your health care provider if you experience any of the following:  redness, tenderness, or signs of infection (pain, swelling, redness, odor or green/yellow discharge around incision site)     Notify your health care provider if you experience any of the following:  difficulty breathing or increased cough     Notify your health care provider if you experience any of the following:  severe persistent headache     Notify your health care provider if you experience any of the following:  worsening rash     Notify your health care provider if you experience any of the following:  persistent dizziness, light-headedness, or visual disturbances     Notify your health care provider if you experience any of the following:  increased confusion or weakness     Activity as tolerated       Significant Diagnostic Studies: Labs: All labs within the past 24 hours have been reviewed    Pending Diagnostic Studies:     Procedure Component Value Units Date/Time    Basic Metabolic Panel [[id number]]     Order Status: Sent Lab Status: No result     Specimen: Blood          Medications:  Reconciled Home Medications:      Medication List      START taking these medications    apixaban 5 mg Tab  Commonly known as: ELIQUIS  Take 1 tablet (5 mg total) by mouth 2 (two) times daily.     hydroCHLOROthiazide 25 MG tablet  Commonly known as: HYDRODIURIL  Take 1 tablet (25 mg total) by mouth once daily.     metoprolol succinate 25 MG 24 hr tablet  Commonly known as: TOPROL-XL  Take 1 tablet (25 mg total) by mouth 2 (two) times daily.        CHANGE how you take these medications    amLODIPine 10 MG tablet  Commonly known as: NORVASC  Take 1 tablet (10 mg total) by mouth once daily.  What changed:   · how much to take  · when to  take this        CONTINUE taking these medications    rosuvastatin 40 MG Tab  Commonly known as: CRESTOR  Take 1 tablet (40 mg total) by mouth every evening.        STOP taking these medications    cloNIDine 0.2 mg/24 hr td ptwk 0.2 mg/24 hr  Commonly known as: CATAPRES     nystatin cream  Commonly known as: MYCOSTATIN            Indwelling Lines/Drains at time of discharge:   Lines/Drains/Airways     None                 Time spent on the discharge of patient: 35 minutes        Meredith Platt MD  Department of Hospital Medicine  [facility] - Intensive Care

## 2022-10-23 NOTE — CARE UPDATE
[facility] [place] Intensive Care  ICU Shift Summary  Date: 10/23/2022      Prehospitalization: Home  Admit Date / LOS : 10/20/2022/ 3 days    Diagnosis: New onset atrial fibrillation    Consults:        Active: Cardio, Psych, and SW       Needed: N/A     Code Status: Full Code   Advanced Directive: <no information>    LDA:  Lines/Drains/Airways       Peripheral Intravenous Line  Duration                  Peripheral IV - Single Lumen 10/20/22 1623 22 G Anterior;Distal;Right Forearm 2 days         Peripheral IV - Single Lumen 10/21/22 1418 20 G Right Antecubital 1 day                  Central Lines/Site/Justification:Patient Does Not Have Central Line  Urinary Cath/Order/Justification:Patient Does Not Have Urinary Catheter    Vasopressors/Infusions:        GOALS: Volume/ Hemodynamic: N/A                     RASS: N/A    Pain Management: none       Pain Controlled: not applicable     Rhythm: NSR    Respiratory Device: Room Air                  Most Recent SBT/ SAT: N/A       MOVE Screen: PASS  ICU Liberation: not applicable    VTE Prophylaxis: Pharm  Mobility: Bedrest  Stress Ulcer Prophylaxis: Yes    Isolation: No active isolations    Dietary:   Current Diet Order   Procedures    Diet Cardiac [facility]; PEC/CEC - Styrofoam     Order Specific Question:   Indicate patient location for additional diet options:     Answer:   [facility]     Order Specific Question:   Tray type:     Answer:   PEC/CEC - Styrofoam    Diet Cardiac      Tolerance: yes  Advancement: @ goal    I & O (24h):    Intake/Output Summary (Last 24 hours) at 10/23/2022 1002  Last data filed at 10/23/2022 0800  Gross per 24 hour   Intake 360 ml   Output 900 ml   Net -540 ml        Restraints: No    Significant Dates:  Post Op Date: N/A  Rescue Date: N/A  Imaging/ Diagnostics: N/A    Noteworthy Labs:  n/a    COVID Test: (--)  CBC/Anemia Labs: Coags:    Recent Labs   Lab 10/21/22  0237 10/21/22  0506   WBC 8.26 8.21   HGB 14.2 15.0   HCT 41.2 43.9     180   MCV 92 91   RDW 12.8 12.8    Recent Labs   Lab 10/20/22  1643 10/21/22  0506 10/21/22  1148   INR 0.9 1.0  --    APTT 25.0 25.6 61.1*        Chemistries:   Recent Labs   Lab 10/20/22  1643 10/21/22  0237 10/22/22  0403    140 139   K 4.8 4.1 3.9    108 105   CO2 21* 23 21*   BUN 27* 20 27*   CREATININE 1.2 1.0 1.1   CALCIUM 8.8 8.8 9.2   PROT 7.1  --   --    BILITOT 0.3  --   --    ALKPHOS 75  --   --    ALT 22  --   --    AST 33  --   --    MG  --  2.0  --    PHOS  --  2.5*  --         Cardiac Enzymes: Ejection Fractions:    Recent Labs     10/20/22  1643   TROPONINI <0.006    EF   Date Value Ref Range Status   10/21/2022 55 % Final        POCT Glucose: HbA1c:    Recent Labs   Lab 10/21/22  1349   POCTGLUCOSE 133*    Hemoglobin A1C   Date Value Ref Range Status   04/28/2022 5.6 <5.7 % Final   10/19/2018 5.4 4.0 - 5.6 % Final     Comment:     ADA Screening Guidelines:  5.7-6.4%  Consistent with prediabetes  >or=6.5%  Consistent with diabetes  High levels of fetal hemoglobin interfere with the HbA1C  assay. Heterozygous hemoglobin variants (HbS, HgC, etc)do  not significantly interfere with this assay.   However, presence of multiple variants may affect accuracy.             ICU LOS 2d 11h  Level of Care: OK to Transfer and Discharge    Chart Check: 12 HR Done  Shift Summary/Plan for the shift: Pt verbally abusive toward sitter using inappropriate language. Pt refused labs and all mediations. Pt remains free from injury. Vs and assessment per flow sheet. Pt transferred out of ICU.  Discharge orders placed attempting to find acceptable psych facility.

## 2022-10-23 NOTE — PROGRESS NOTES
[facility] Medicine  Progress Note    Patient Name: Kierra Daugherty  MRN: [id number]  Patient Class: IP- Inpatient   Admission Date: 10/20/2022  Length of Stay: 3 days  Attending Physician: Meredith Platt MD  Primary Care Provider: Antonieta Khalil MD        Subjective:     Principal Problem:New onset atrial fibrillation        HPI:  This is an 82 year old male with a PMHx of HTN, CVA, HLD who presented with left sided weakness. He is admitted for HTN emergency.     The patient reports left sided weakness that started the night before presentation. He has baseline weakness from a prior stroke but reports it has gotten worse. Additionally, he endorsed having baseline bilateral feet numbness, nausea, vomiting, confusion, blurry vision and generalized pain. He expressed frustration with his previous medical care and he was not taking any of his home medications. In the ED, the patient was hypertensive (227/99 - MAP:  142), tachypnic but otherwise stable. Labs were largely unremarkable, VBG showed acute hypercapnia with a PH of 7.34, PCO2: 51.8. CT head showed chronic ischemic changes but no acute process. Telestroke was consulted, and thought that his symptoms were related to HTN emergency, and recommended nicardipine for BP, with consideration of repeat imaging/CTA if symptoms worsen. The patient was given zofran, pepcid and was started on a nicardipine drip.       Overview/Hospital Course:  Mr Kierra Daugherty was admitted with hypertensive emergency. Started cardene gtt. BP controlled with change in medications- now on amlodipine, metoprolol, and HCTZ. Then developed Afib RVR. Continued BB and started eliquis. Cardiology consulted. He endorsed suicidal ideation. He was PEC'd and Psych consulted. He had delirium. CTH was performed on 2 occasions for confusion, both times negative for acute infarct. CTA did show 80-95% narrowing of proximal L ICA. He is stable for discharge to inpatient psych  placement.       Interval History: Says he had a horrible night because he was cold. He says his medications are making him cold. He makes racist remarks towards staff.     Review of Systems   Constitutional:  Negative for chills and fever.   Respiratory:  Negative for cough and wheezing.    Cardiovascular:  Negative for chest pain, palpitations and leg swelling.   Gastrointestinal:  Negative for abdominal pain, constipation, diarrhea, nausea and vomiting.   Genitourinary:  Negative for difficulty urinating.   Musculoskeletal:  Negative for arthralgias and myalgias.   Skin:  Negative for rash.   Neurological:  Negative for weakness and numbness.   Psychiatric/Behavioral:  Negative for agitation and confusion.    Objective:     Vital Signs (Most Recent):  Temp: 97.8 °F (36.6 °C) (10/23/22 0753)  Pulse: 63 (10/23/22 0753)  Resp: (!) 21 (10/23/22 0753)  BP: (!) 176/78 (10/23/22 0753)  SpO2: 96 % (10/23/22 0753)   Vital Signs (24h Range):  Temp:  [97.6 °F (36.4 °C)-98.1 °F (36.7 °C)] 97.8 °F (36.6 °C)  Pulse:  [53-79] 63  Resp:  [2-33] 21  SpO2:  [92 %-98 %] 96 %  BP: (117-189)/() 176/78     Weight: 72.4 kg (159 lb 9.8 oz)  Body mass index is 23.57 kg/m².    Intake/Output Summary (Last 24 hours) at 10/23/2022 0922  Last data filed at 10/23/2022 0800  Gross per 24 hour   Intake 360 ml   Output 900 ml   Net -540 ml        Physical Exam  Vitals and nursing note reviewed.   Constitutional:       General: He is not in acute distress.     Appearance: He is not ill-appearing, toxic-appearing or diaphoretic.   HENT:      Head: Normocephalic and atraumatic.      Nose: Nose normal.      Mouth/Throat:      Mouth: Mucous membranes are moist.   Eyes:      General: No scleral icterus.        Right eye: No discharge.         Left eye: No discharge.      Conjunctiva/sclera: Conjunctivae normal.   Cardiovascular:      Rate and Rhythm: Normal rate and regular rhythm.      Pulses: Normal pulses.      Heart sounds: Normal heart  sounds. No murmur heard.    No gallop.      Comments: NSR  Pulmonary:      Effort: Pulmonary effort is normal. No respiratory distress.      Breath sounds: Normal breath sounds. No wheezing or rales.      Comments: Room air  Abdominal:      General: Bowel sounds are normal. There is no distension.      Palpations: Abdomen is soft. There is no mass.      Tenderness: There is no abdominal tenderness. There is no guarding.   Musculoskeletal:         General: No swelling or tenderness.      Right lower leg: No edema.      Left lower leg: No edema.   Skin:     General: Skin is warm and dry.      Coloration: Skin is not jaundiced.      Findings: No bruising, erythema, lesion or rash.   Neurological:      Mental Status: He is alert and oriented to person, place, and time.       Significant Labs: All pertinent labs within the past 24 hours have been reviewed.    Significant Imaging: I have reviewed all pertinent imaging results/findings within the past 24 hours.      Assessment/Plan:      * New onset atrial fibrillation  Patient has new onset atrial fibrillation which is uncontrolled. Patient is currently in atrial fibrillation.    Started metoprolol   Anticoagulation: eliquis  TTE EF 55%, normal diastolic function  Cardiology consulted     [id number] - VASc score:  Congestive Heart Failure or EF < 35% NO   Hypertension YES  +1   Age >= 75 YES  +2   Diabetes Mellitus NO   Stroke/TIA prior history YES  +2   Vascular disease (PAD, MI or Aortic Plaque)? YES +1   Age 64 - 74 NO   Female NO   Total 6           Balanitis  Continue clotrimazole-betamethasone cream      Left-sided carotid artery disease  High-grade calcific atherosclerotic narrowing of the proximal left internal carotid artery in the range of 80-95% noted on CTA  - Vascular surgery follow up       Altered mental status  Suspect delirium   CTH chronic microvascular disease and remote lacunar infarcts. At risk for vascular dementia      Suicidal  ideation  CEC'd  Psychiatry consult   PRN haldol if needed  Inpatient psych on discharge       History of CVA (cerebrovascular accident)  - W/ left sided residual weakness   - Continue statin  - started eliquis for AFib      Hypertensive emergency  BP uncontrolled on admit with weakness and multiple other complaints. No new stroke on CTH. No signs of CHF. Renal function normal. Previously treated with cardene gtt, now off  - continue metoprolol   - increased amlodipine to 10mg daily. Added HCTZ 25mg daily   - PRN hydralazine     Mixed hyperlipidemia  Lipids are very poorly controlled  Lab Results   Component Value Date    CHOL 231 (H) 10/20/2022    CHOL 271 (H) 10/19/2018     Lab Results   Component Value Date    HDL 33 (L) 10/20/2022    HDL 34 (L) 10/19/2018     Lab Results   Component Value Date    LDLCALC Invalid, Trig>400.0 10/20/2022    LDLCALC 178.6 (H) 10/19/2018     Lab Results   Component Value Date    TRIG 470 (H) 10/20/2022    TRIG 292 (H) 10/19/2018     Lab Results   Component Value Date    CHOLHDL 14.3 (L) 10/20/2022    CHOLHDL 12.5 (L) 10/19/2018     Continue atorvastatin        VTE Risk Mitigation (From admission, onward)           Ordered     apixaban tablet 5 mg  2 times daily         10/21/22 1802     IP VTE HIGH RISK PATIENT  Once         10/20/22 2139     Place sequential compression device  Until discontinued         10/20/22 2139                    Discharge Planning   BALDEV:      Code Status: Full Code   Is the patient medically ready for discharge?:     Reason for patient still in hospital (select all that apply): Patient trending condition  Discharge Plan A: Psychiatric hospital        2:23 PM  Recurrent Afib RVR. Mr Daugherty refused to take metoprolol and eliquis this morning. Given diltiazem 15mg with temporary slowing of Afib rate but did not convert. Now back in RVR. Metoprolol IV and PO ordered. If does not convert, will need to move back to ICU for amio gtt.     Critical care time spent on  the evaluation and treatment of severe organ dysfunction, review of pertinent labs and imaging studies, discussions with consulting providers and discussions with patient/family: 30 minutes.      Meredith Platt MD  Department of Hospital Medicine   [facility] [place] - Intensive Care

## 2022-10-23 NOTE — CARE UPDATE
[facility] Intensive Care  ICU Shift Summary  Date: 10/23/2022      Prehospitalization: Home  Admit Date / LOS : 10/20/2022/ 3 days    Diagnosis: New onset atrial fibrillation    Consults:        Active: Cardio, Psych, and SW       Needed: N/A     Code Status: Full Code   Advanced Directive: <no information>    LDA:  Lines/Drains/Airways       Peripheral Intravenous Line  Duration                  Peripheral IV - Single Lumen 10/20/22 1623 22 G Anterior;Distal;Right Forearm 2 days         Peripheral IV - Single Lumen 10/21/22 1418 20 G Right Antecubital 1 day                  Central Lines/Site/Justification:Patient Does Not Have Central Line  Urinary Cath/Order/Justification:Patient Does Not Have Urinary Catheter    Vasopressors/Infusions:        GOALS: Volume/ Hemodynamic: N/A          Pain Management: none       Pain Controlled: not applicable     Rhythm: NSR and SB    Respiratory Device: Room Air                  Most Recent SBT/ SAT: N/A  ICU Liberation: not applicable    VTE Prophylaxis: Pharm  Mobility: Bedrest  Stress Ulcer Prophylaxis: No    Isolation: No active isolations    Dietary:   Current Diet Order   Procedures    Diet Cardiac [facility]; PEC/CEC - Styrofoam     Order Specific Question:   Indicate patient location for additional diet options:     Answer:   [facility]     Order Specific Question:   Tray type:     Answer:   PEC/CEC - Styrofoam      Tolerance: yes  Advancement: @ goal    I & O (24h):    Intake/Output Summary (Last 24 hours) at 10/23/2022 0450  Last data filed at 10/23/2022 0222  Gross per 24 hour   Intake 360 ml   Output 775 ml   Net -415 ml        Restraints: No    Significant Dates:  Post Op Date: N/A  Rescue Date: N/A  Imaging/ Diagnostics: N/A    Noteworthy Labs:  none    COVID Test: (--)  CBC/Anemia Labs: Coags:    Recent Labs   Lab 10/21/22  0237 10/21/22  0506   WBC 8.26 8.21   HGB 14.2 15.0   HCT 41.2 43.9    180   MCV 92 91   RDW 12.8 12.8    Recent Labs    Lab 10/20/22  1643 10/21/22  0506 10/21/22  1148   INR 0.9 1.0  --    APTT 25.0 25.6 61.1*        Chemistries:   Recent Labs   Lab 10/20/22  1643 10/21/22  0237 10/22/22  0403    140 139   K 4.8 4.1 3.9    108 105   CO2 21* 23 21*   BUN 27* 20 27*   CREATININE 1.2 1.0 1.1   CALCIUM 8.8 8.8 9.2   PROT 7.1  --   --    BILITOT 0.3  --   --    ALKPHOS 75  --   --    ALT 22  --   --    AST 33  --   --    MG  --  2.0  --    PHOS  --  2.5*  --         Cardiac Enzymes: Ejection Fractions:    Recent Labs     10/20/22  1643   TROPONINI <0.006    EF   Date Value Ref Range Status   10/21/2022 55 % Final        POCT Glucose: HbA1c:    Recent Labs   Lab 10/21/22  1349   POCTGLUCOSE 133*    Hemoglobin A1C   Date Value Ref Range Status   04/28/2022 5.6 <5.7 % Final   10/19/2018 5.4 4.0 - 5.6 % Final     Comment:     ADA Screening Guidelines:  5.7-6.4%  Consistent with prediabetes  >or=6.5%  Consistent with diabetes  High levels of fetal hemoglobin interfere with the HbA1C  assay. Heterozygous hemoglobin variants (HbS, HgC, etc)do  not significantly interfere with this assay.   However, presence of multiple variants may affect accuracy.             ICU LOS 2d 6h  Level of Care: OK to Transfer    Chart Check: 24 HR Done  Shift Summary/Plan for the shift: see previous notes.

## 2022-10-23 NOTE — NURSING
Pt still a-fib RVR -120s. Second push of metoprolol given by charge rn per orders. Will continue to monitor

## 2022-10-23 NOTE — NURSING
Pt given 10 of Hydralazine IV push with relief, pt also given PRN Haldol for agitation with relief, pt then agreed to take nightly oral medications.

## 2022-10-23 NOTE — NURSING
Pt now rate controlled 58-90s  back and forth between afib and sinus rhythm. MD aware. Will continue to monitor

## 2022-10-23 NOTE — NURSING
Pt Hr going back into the 120s. Dr. Platt notified. Metoprolol IV push ordered and given by RN pt HR now 100. Will continue to monitor.

## 2022-10-24 VITALS
HEIGHT: 69 IN | HEART RATE: 61 BPM | DIASTOLIC BLOOD PRESSURE: 63 MMHG | WEIGHT: 159.63 LBS | OXYGEN SATURATION: 99 % | TEMPERATURE: 98 F | RESPIRATION RATE: 18 BRPM | BODY MASS INDEX: 23.64 KG/M2 | SYSTOLIC BLOOD PRESSURE: 136 MMHG

## 2022-10-24 PROBLEM — I16.1 HYPERTENSIVE EMERGENCY: Status: RESOLVED | Noted: 2022-10-20 | Resolved: 2022-10-24

## 2022-10-24 PROBLEM — R41.82 ALTERED MENTAL STATUS: Status: RESOLVED | Noted: 2022-10-21 | Resolved: 2022-10-24

## 2022-10-24 PROBLEM — F32.A DEPRESSION: Status: ACTIVE | Noted: 2022-10-24

## 2022-10-24 LAB
ANION GAP SERPL CALC-SCNC: 9 MMOL/L (ref 8–16)
BUN SERPL-MCNC: 34 MG/DL (ref 8–23)
CALCIUM SERPL-MCNC: 8.8 MG/DL (ref 8.7–10.5)
CHLORIDE SERPL-SCNC: 107 MMOL/L (ref 95–110)
CO2 SERPL-SCNC: 24 MMOL/L (ref 23–29)
CREAT SERPL-MCNC: 1.1 MG/DL (ref 0.5–1.4)
EST. GFR  (NO RACE VARIABLE): >60 ML/MIN/1.73 M^2
GLUCOSE SERPL-MCNC: 106 MG/DL (ref 70–110)
POTASSIUM SERPL-SCNC: 4.6 MMOL/L (ref 3.5–5.1)
SODIUM SERPL-SCNC: 140 MMOL/L (ref 136–145)

## 2022-10-24 PROCEDURE — 99233 PR SUBSEQUENT HOSPITAL CARE,LEVL III: ICD-10-PCS | Mod: ,,, | Performed by: PSYCHIATRY & NEUROLOGY

## 2022-10-24 PROCEDURE — 25000003 PHARM REV CODE 250: Performed by: HOSPITALIST

## 2022-10-24 PROCEDURE — 80048 BASIC METABOLIC PNL TOTAL CA: CPT | Performed by: HOSPITALIST

## 2022-10-24 PROCEDURE — 99233 SBSQ HOSP IP/OBS HIGH 50: CPT | Mod: ,,, | Performed by: PSYCHIATRY & NEUROLOGY

## 2022-10-24 PROCEDURE — 11000001 HC ACUTE MED/SURG PRIVATE ROOM

## 2022-10-24 PROCEDURE — 36415 COLL VENOUS BLD VENIPUNCTURE: CPT | Performed by: HOSPITALIST

## 2022-10-24 RX ADMIN — AMLODIPINE BESYLATE 10 MG: 5 TABLET ORAL at 09:10

## 2022-10-24 RX ADMIN — MUPIROCIN: 20 OINTMENT TOPICAL at 09:10

## 2022-10-24 RX ADMIN — ACETAMINOPHEN 650 MG: 325 TABLET ORAL at 03:10

## 2022-10-24 RX ADMIN — METOPROLOL SUCCINATE 50 MG: 50 TABLET, EXTENDED RELEASE ORAL at 09:10

## 2022-10-24 RX ADMIN — APIXABAN 5 MG: 5 TABLET, FILM COATED ORAL at 09:10

## 2022-10-24 RX ADMIN — HYPROMELLOSE 2910 1 DROP: 5 SOLUTION OPHTHALMIC at 10:10

## 2022-10-24 RX ADMIN — HYDRALAZINE HYDROCHLORIDE 25 MG: 25 TABLET, FILM COATED ORAL at 03:10

## 2022-10-24 NOTE — SUBJECTIVE & OBJECTIVE
"Interval History: improved medically    Family History       Problem Relation (Age of Onset)    Cancer Father, Brother    Heart disease Mother, Brother    Liver disease Sister    Neuropathy Sister    Stroke Mother          Tobacco Use    Smoking status: Former     Types: Cigarettes     Start date: 1952     Quit date: 4/10/1983     Years since quittin.5    Smokeless tobacco: Never   Substance and Sexual Activity    Alcohol use: No     Comment: Heavy at one time.    Drug use: No    Sexual activity: Never     Psychotherapeutics (From admission, onward)      Start     Stop Route Frequency Ordered    10/21/22 1823  haloperidol lactate injection 2 mg         -- IV Every 4 hours PRN 10/21/22 1724             Review of Systems   Constitutional:  Negative for activity change and fatigue.   Respiratory:  Negative for shortness of breath.    Cardiovascular:  Negative for chest pain.   Gastrointestinal:  Negative for nausea.   Musculoskeletal:  Negative for myalgias.   Psychiatric/Behavioral:  Negative for dysphoric mood, sleep disturbance and suicidal ideas. The patient is nervous/anxious.    Objective:     Vital Signs (Most Recent):  Temp: 98 °F (36.7 °C) (10/24/22 1130)  Pulse: 61 (10/24/22 1130)  Resp: 18 (10/24/22 1130)  BP: 136/63 (10/24/22 1130)  SpO2: 99 % (10/24/22 1130) Vital Signs (24h Range):  Temp:  [97.5 °F (36.4 °C)-98.8 °F (37.1 °C)] 98 °F (36.7 °C)  Pulse:  [] 61  Resp:  [17-20] 18  SpO2:  [94 %-99 %] 99 %  BP: (115-181)/(63-87) 136/63     Height: 5' 9" (175.3 cm)  Weight: 72.4 kg (159 lb 9.8 oz)  Body mass index is 23.57 kg/m².      Intake/Output Summary (Last 24 hours) at 10/24/2022 1439  Last data filed at 10/24/2022 1247  Gross per 24 hour   Intake 580 ml   Output 700 ml   Net -120 ml       Physical Exam  Psychiatric:      Comments: EXAMINATION    CONSTITUTIONAL  General Appearance: hospital attire    MUSCULOSKELETAL  Muscle Strength and Tone: normal  Abnormal Involuntary Movements: none " "noted  Gait and Station: not observed    PSYCHIATRIC MENTAL STATUS EXAM   Level of Consciousness: awake and alert  Orientation: name, month/year  Grooming: limited  Psychomotor Behavior: mildly slowed  Speech: normal rate and tone  Language: no abnormalities  Mood: "good"  Affect: mildly blunted  Thought Process: linear  Associations: intact  Thought Content: perseverates on "I don't know" and on "herpes"  Memory: poor to recent and remote  Attention: mildly distracted  Fund of Knowledge: poor to conversation  Insight: poor into his medical care  Judgment: fair into cooperation with care            Significant Labs: All pertinent labs within the past 24 hours have been reviewed.    Significant Imaging: I have reviewed all pertinent imaging results/findings within the past 24 hours.  "

## 2022-10-24 NOTE — ASSESSMENT & PLAN NOTE
Patient expressed suicidal ideation with plan to shot self with gun that he has at home.  Agree with PEC/CEC and 1:1 sitter.  Seek acute inpatient psychiatric facility when medically cleared.  Unknown psychiatric history but hold off of medications at this time until he is medically cleared.      10/24/22 - Patient is still with passive thoughts of using gun he has at home.  Cannot give clear plan of safety, poor understanding.

## 2022-10-24 NOTE — DISCHARGE SUMMARY
[facility] Medicine  Discharge Summary      Patient Name: Kierra Daugherty  MRN: [id number]  Patient Class: IP- Inpatient  Admission Date: 10/20/2022  Hospital Length of Stay: 4 days  Discharge Date and Time:  10/24/2022 9:23 AM  Attending Physician: Rk Garcia MD   Discharging Provider: Rk Garcia MD  Primary Care Provider: Antonieta Khalil MD      HPI:   This is an 82 year old male with a PMHx of HTN, CVA, HLD who presented with left sided weakness. He is admitted for HTN emergency.     The patient reports left sided weakness that started the night before presentation. He has baseline weakness from a prior stroke but reports it has gotten worse. Additionally, he endorsed having baseline bilateral feet numbness, nausea, vomiting, confusion, blurry vision and generalized pain. He expressed frustration with his previous medical care and he was not taking any of his home medications. In the ED, the patient was hypertensive (227/99 - MAP:  142), tachypnic but otherwise stable. Labs were largely unremarkable, VBG showed acute hypercapnia with a PH of 7.34, PCO2: 51.8. CT head showed chronic ischemic changes but no acute process. Telestroke was consulted, and thought that his symptoms were related to HTN emergency, and recommended nicardipine for BP, with consideration of repeat imaging/CTA if symptoms worsen. The patient was given zofran, pepcid and was started on a nicardipine drip.       * No surgery found *      Hospital Course:   Mr Kierra Daugherty was admitted with hypertensive emergency. Started cardene gtt. BP controlled with change in medications- now on amlodipine, metoprolol, and HCTZ. Then developed Afib RVR. Continued BB and started eliquis. Cardiology consulted. He endorsed suicidal ideation. He was PEC'd and Psych consulted. He had delirium. Code Stroke was performed on 2 occasions for confusion, both times negative for acute infarct. CTA did show 80-95% narrowing of proximal L  ICA. He has remained afebrile and hemodynamically stable.  He is stable for discharge to inpatient psych placement.       Goals of Care Treatment Preferences:  Code Status: Full Code      Consults:   Consults (From admission, onward)        Status Ordering Provider     Inpatient consult to Cardiology  Once        Provider:  Barry Whittaker MD    Completed REGINA PULIDO     IP consult to case management  Once        Provider:  (Not yet assigned)    Completed ARIS MONTAÑO     Inpatient consult to Psychiatry  Once        Provider:  Shad Anderson MD    Completed REGINA PULIDO     Consult to Telemedicine - Acute Stroke  Once        Provider:  Carlos Waite MD    Completed FLAKITO SERNA          No new Assessment & Plan notes have been filed under this hospital service since the last note was generated.  Service: Hospital Medicine    Final Active Diagnoses:    Diagnosis Date Noted POA    PRINCIPAL PROBLEM:  New onset atrial fibrillation [I48.91] 10/21/2022 No    Left-sided carotid artery disease [I77.9] 10/22/2022 Yes    Balanitis [N48.1] 10/22/2022 Yes    Suicidal ideation [R45.851] 10/21/2022 Not Applicable    History of CVA (cerebrovascular accident) [Z86.73] 10/20/2022 Not Applicable    Mixed hyperlipidemia [E78.2] 10/21/2018 Yes      Problems Resolved During this Admission:    Diagnosis Date Noted Date Resolved POA    Aphasia [R47.01] 10/21/2022 10/22/2022 Yes    Altered mental status [R41.82] 10/21/2022 10/24/2022 Yes    Hypertensive emergency [I16.1] 10/20/2022 10/24/2022 Yes       Discharged Condition: stable    Disposition: Psychiatric Hospital    Follow Up:   Follow-up Information     Antonieta Khalil MD Follow up.    Specialties: Internal Medicine, Wound Care  Contact information:  [address] [address]  [address]  [place] [id number]  [phone]                       Patient Instructions:      Ambulatory referral/consult to Vascular Surgery   Standing Status: Future   Referral Priority:  Routine Referral Type: Consultation   Referral Reason: Specialty Services Required   Requested Specialty: Vascular Surgery   Number of Visits Requested: 1     Diet Cardiac     Notify your health care provider if you experience any of the following:  temperature >100.4     Notify your health care provider if you experience any of the following:  persistent nausea and vomiting or diarrhea     Notify your health care provider if you experience any of the following:  severe uncontrolled pain     Notify your health care provider if you experience any of the following:  redness, tenderness, or signs of infection (pain, swelling, redness, odor or green/yellow discharge around incision site)     Notify your health care provider if you experience any of the following:  difficulty breathing or increased cough     Notify your health care provider if you experience any of the following:  severe persistent headache     Notify your health care provider if you experience any of the following:  worsening rash     Notify your health care provider if you experience any of the following:  persistent dizziness, light-headedness, or visual disturbances     Notify your health care provider if you experience any of the following:  increased confusion or weakness     Activity as tolerated         Pending Diagnostic Studies:     None         Medications:  Reconciled Home Medications:      Medication List      START taking these medications    apixaban 5 mg Tab  Commonly known as: ELIQUIS  Take 1 tablet (5 mg total) by mouth 2 (two) times daily.     metoprolol succinate 50 MG 24 hr tablet  Commonly known as: TOPROL-XL  Take 1 tablet (50 mg total) by mouth 2 (two) times daily.        CHANGE how you take these medications    amLODIPine 10 MG tablet  Commonly known as: NORVASC  Take 1 tablet (10 mg total) by mouth once daily.  What changed:   · how much to take  · when to take this        CONTINUE taking these medications    rosuvastatin 40 MG  Tab  Commonly known as: CRESTOR  Take 1 tablet (40 mg total) by mouth every evening.        STOP taking these medications    cloNIDine 0.2 mg/24 hr td ptwk 0.2 mg/24 hr  Commonly known as: CATAPRES     nystatin cream  Commonly known as: MYCOSTATIN            Indwelling Lines/Drains at time of discharge:   Lines/Drains/Airways     None                 Time spent on the discharge of patient: >30 minutes         Rk Garcia MD  Department of Hospital Medicine  [facility] [place] - [facility]

## 2022-10-24 NOTE — ASSESSMENT & PLAN NOTE
"10/24/22 - Patient has depressive symptoms but this may/may not be a pseudodementia type of picture.  I do feel that he is having some perseverative paranoia (herpes) and memory issues.  He has also had some recent impulsive outbursts, like getting removed from PCP clinic for racial slurs.  He also uses "I don't know what is going on" a lot.  He seems to have somewhat of an understanding that he is having trouble remembering.  This may be a primary memory problem in early-mid stages or a depression.  Inpatient psychiatry should focus on helping depression.  If memory does not improve, he may have a limited baseline to function on his own.  Start sertraline 25 mg nightly for depression.  He may/may not be able to live on his own.  Family should be notified to remove weapon from home, should he return.  "

## 2022-10-24 NOTE — NURSING
Report called to [facility] to Nurse Lexie. Awaiting transportation at this time.

## 2022-10-24 NOTE — PLAN OF CARE
ADT 32 placed for transfer center to coordinate inpatient psych placement. TN to continue to follow.

## 2022-10-24 NOTE — NURSING
Patient transported to [facility] via stretcher by ambulance. All patient's belongings/ valuables returned to patient. Patient's cash money counted and returned to patient, witnessed by nurse colleague.

## 2022-10-24 NOTE — PLAN OF CARE
Problem: Adult Inpatient Plan of Care  Goal: Plan of Care Review  Outcome: Ongoing, Progressing  Goal: Patient-Specific Goal (Individualized)  Outcome: Ongoing, Progressing     Problem: Fall Injury Risk  Goal: Absence of Fall and Fall-Related Injury  Outcome: Ongoing, Progressing     Pt free from falls or any further trauma thorough out the shift. Prescribed medication administered. Complaints of headache, prn medication given. Pt in no distress. Will continue to monitor.

## 2022-10-24 NOTE — PLAN OF CARE
[place] [facility]  Discharge Final Note    Patient clear to discharge from case management stand point for inpatient psych. TN spoke with patient's nephew for update on discharge placement.     Primary Care Provider: Antonieta Khalil MD    Expected Discharge Date: 10/24/2022    Final Discharge Note (most recent)       Final Note - 10/24/22 1625          Final Note    Assessment Type Final Discharge Note     Anticipated Discharge Disposition [facility]    What phone number can be called within the next 1-3 days to see how you are doing after discharge? [phone]     Hospital Resources/Appts/Education Provided Provided patient/caregiver with written discharge plan information        Post-Acute Status    Post-Acute Authorization Other     Post-Acute Placement Status Set-up Complete/Auth obtained     Coverage PHN     Discharge Delays None known at this time                     Important Message from Medicare  Important Message from Medicare regarding Discharge Appeal Rights: (P) Given to patient/caregiver, Explained to patient/caregiver, Signed/date by patient/caregiver     Date IMM was signed: (P) 10/23/22  Time IMM was signed: (P) 1100    Contact Info       Antonieta Khalil MD   Specialty: Internal Medicine, Wound Care   Relationship: PCP - General    [address] [place] [address]  [address]  [place] [id number]   Phone: [phone]       Next Steps: Follow up

## 2022-10-24 NOTE — PLAN OF CARE
Plan for patient discharge to inpatient psych facility. PEC/CEC in patient chart. Per [place], with  transfer center, awaiting accepting facility. Spoke with patient's nephew Fady Daugherty ([phone]), informed of discharge plan, verbalized understanding. TN to update pt nephew upon discharge.    10/24/22 1338   Post-Acute Status   Post-Acute Authorization Other  (Inpatient psych)   Post-Acute Placement Status Pending Bed Availability   Coverage PHN   Discharge Delays (!) PFC Arranged Transportation   Discharge Plan   Discharge Plan A Psychiatric hospital   Discharge Plan B Home

## 2022-10-24 NOTE — PROGRESS NOTES
"[facility]  Psychiatry  Progress Note    Patient Name: Kierra Daugherty  MRN: [id number]   Code Status: Full Code  Admission Date: 10/20/2022  Hospital Length of Stay: 4 days  Expected Discharge Date: 10/24/2022  Attending Physician: Rk Garcia MD  Primary Care Provider: Antonieta Khalil MD    Current Legal Status: 's Emergency Certificate (CEC)    Patient information was obtained from patient, ER records and chart review, nursing.       Subjective:     Patient is a 82 y.o., male, presents with:    Principal Problem:New onset atrial fibrillation    Chief Complaint: depression, suicidal ideation    HPI: Patient Kierra Daugherty presented to the hospital with left sided weakness and admitted for hypertensive emergency.  He also expressed desire to shot himself with a gun that he has at home.  PEC placed and  has come by today to place CEC on chart.  Patient was evaluated in room after awakening but is confused.  He answers most questions with "I am not sure what is going on".  He is oriented to name only and every other question is answered with "I am not sure what is going on".  Unknown psychiatric history or prior self harm.  Chart review shows that his PCP evaluated and diagnosed him with depression in 2018.  At that time, he disclosed to her that he abused himself anally and was making bizarre statements to her.  Nursing reports that patient has been odd, needy, and staring a lot.  He is not able to provide any information about where he lives or if he has family.  Chart review also shows that he did get haloperidol injections on 5/18/21 after some bizarre behavior in the ED, which cleared and was discharged home.      Hospital Course: 10/24/2022 - Patient seen in hospital room with sitter at bedside.  PEC/CEC noted on chart.  Patient now medically cleared for inpatient psychiatric care.  Patient states that he has depression and sadness and was crying earlier today, although not sure why.  " "He is somewhat confused and not a fully historian.  Says that he has no psychiatric history.  Oriented to name and month/year.  Not able to name the place where we are or why he was brought to the hospital.  When asked about his primary care, he says that he was "kicked out" because he call her a racial slur (ambivalent about this statement).  He says that he has no family other than a sister in a nursing home "somewhere" and a nephew who lives in [place].  He is able to repeat 3 words immediately but not able to recall them after 30 seconds.  He is able to spell the word CAT forwards and backwards.  Denies any family history of mental illness.  When asked about memory problems in the family, he says "how am I supposed to know that".  He says that he drives and lives alone and pays his bills.  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  Not sure of what medical problems he has or medications he is supposed to take.  He denies suicidal ideations but says that he has a gun at home and has thought about using it on himself.  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  Mentions this many times.  Says that he can't take it anymore and wants the "herpes" to go away.  Says that it is taking over his body.      Interval History: improved medically    Family History       Problem Relation (Age of Onset)    Cancer Father, Brother    Heart disease Mother, Brother    Liver disease Sister    Neuropathy Sister    Stroke Mother          Tobacco Use    Smoking status: Former     Types: Cigarettes     Start date: 1952     Quit date: 4/10/1983     Years since quittin.5    Smokeless tobacco: Never   Substance and Sexual Activity    Alcohol use: No     Comment: Heavy at one time.    Drug use: No    Sexual activity: Never     Psychotherapeutics (From admission, onward)      Start     Stop Route Frequency Ordered    10/21/22 1823  haloperidol lactate " "injection 2 mg         -- IV Every 4 hours PRN 10/21/22 1724             Review of Systems   Constitutional:  Negative for activity change and fatigue.   Respiratory:  Negative for shortness of breath.    Cardiovascular:  Negative for chest pain.   Gastrointestinal:  Negative for nausea.   Musculoskeletal:  Negative for myalgias.   Psychiatric/Behavioral:  Negative for dysphoric mood, sleep disturbance and suicidal ideas. The patient is nervous/anxious.    Objective:     Vital Signs (Most Recent):  Temp: 98 °F (36.7 °C) (10/24/22 1130)  Pulse: 61 (10/24/22 1130)  Resp: 18 (10/24/22 1130)  BP: 136/63 (10/24/22 1130)  SpO2: 99 % (10/24/22 1130) Vital Signs (24h Range):  Temp:  [97.5 °F (36.4 °C)-98.8 °F (37.1 °C)] 98 °F (36.7 °C)  Pulse:  [] 61  Resp:  [17-20] 18  SpO2:  [94 %-99 %] 99 %  BP: (115-181)/(63-87) 136/63     Height: 5' 9" (175.3 cm)  Weight: 72.4 kg (159 lb 9.8 oz)  Body mass index is 23.57 kg/m².      Intake/Output Summary (Last 24 hours) at 10/24/2022 1439  Last data filed at 10/24/2022 1247  Gross per 24 hour   Intake 580 ml   Output 700 ml   Net -120 ml       Physical Exam  Psychiatric:      Comments: EXAMINATION    CONSTITUTIONAL  General Appearance: hospital attire    MUSCULOSKELETAL  Muscle Strength and Tone: normal  Abnormal Involuntary Movements: none noted  Gait and Station: not observed    PSYCHIATRIC MENTAL STATUS EXAM   Level of Consciousness: awake and alert  Orientation: name, month/year  Grooming: limited  Psychomotor Behavior: mildly slowed  Speech: normal rate and tone  Language: no abnormalities  Mood: "good"  Affect: mildly blunted  Thought Process: linear  Associations: intact  Thought Content: perseverates on "I don't know" and on "herpes"  Memory: poor to recent and remote  Attention: mildly distracted  Fund of Knowledge: poor to conversation  Insight: poor into his medical care  Judgment: fair into cooperation with care            Significant Labs: All pertinent labs within " "the past 24 hours have been reviewed.    Significant Imaging: I have reviewed all pertinent imaging results/findings within the past 24 hours.       Scheduled Medications:   amLODIPine  10 mg Oral Daily    apixaban  5 mg Oral BID    artificial tears  1 drop Both Eyes TID    atorvastatin  40 mg Oral QHS    metoprolol succinate  50 mg Oral BID    mupirocin   Nasal BID       PRN Medications:  acetaminophen, albuterol-ipratropium, bismuth subsalicylate, calcium gluconate IVPB, calcium gluconate IVPB, calcium gluconate IVPB, haloperidol lactate, hydrALAZINE, hydrALAZINE, magnesium sulfate IVPB, magnesium sulfate IVPB, melatonin, metoprolol, ondansetron, potassium chloride **AND** potassium chloride **AND** potassium chloride, sodium chloride 0.9%, sodium phosphate IVPB, sodium phosphate IVPB, sodium phosphate IVPB    Review of patient's allergies indicates:   Allergen Reactions    Cymbalta [duloxetine] Other (See Comments)     Kidney failure    Iodine and iodide containing products Other (See Comments)     Chest, bladder pain    Latex, natural rubber Other (See Comments)     unknown    Asa [aspirin]      No rash, but feels bad when he takes it       Assessment/Plan:     Depression  10/24/22 - Patient has depressive symptoms but this may/may not be a pseudodementia type of picture.  I do feel that he is having some perseverative paranoia (herpes) and memory issues.  He has also had some recent impulsive outbursts, like getting removed from PCP clinic for racial slurs.  He also uses "I don't know what is going on" a lot.  He seems to have somewhat of an understanding that he is having trouble remembering.  This may be a primary memory problem in early-mid stages or a depression.  Inpatient psychiatry should focus on helping depression.  If memory does not improve, he may have a limited baseline to function on his own.  Start sertraline 25 mg nightly for depression.  He may/may not be able to live on his own.  " Family should be notified to remove weapon from home, should he return.    Suicidal ideation  Patient expressed suicidal ideation with plan to shot self with gun that he has at home.  Agree with PEC/CEC and 1:1 sitter.  Seek acute inpatient psychiatric facility when medically cleared.  Unknown psychiatric history but hold off of medications at this time until he is medically cleared.      10/24/22 - Patient is still with passive thoughts of using gun he has at home.  Cannot give clear plan of safety, poor understanding.         Need for Continued Hospitalization:  Psychiatric illness continues to pose a potential threat to life or bodily function, of self or others, thereby requiring the need for continued inpatient psychiatric hospitalization., Protective inpatient psychiatric hospitalization required while a safe disposition plan is enacted. and Requires ongoing hospitalization for stabilization of medications.    Anticipated Disposition:  Psychiatric Hospital    Total time:  35 with greater than 50% of this time spent in counseling and/or coordination of care.       Shad Anderson MD   Psychiatry  [facility] [place] - Med Surg
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
## 2023-01-29 ENCOUNTER — HOSPITAL ENCOUNTER (INPATIENT)
Facility: HOSPITAL | Age: 83
LOS: 2 days | Discharge: PSYCHIATRIC OR SUBSTANCE USE FACILITY | DRG: 309 | End: 2023-01-31
Attending: EMERGENCY MEDICINE | Admitting: STUDENT IN AN ORGANIZED HEALTH CARE EDUCATION/TRAINING PROGRAM
Payer: MEDICARE

## 2023-01-29 DIAGNOSIS — I10 HTN (HYPERTENSION): ICD-10-CM

## 2023-01-29 DIAGNOSIS — I10 HYPERTENSION, UNSPECIFIED TYPE: Primary | ICD-10-CM

## 2023-01-29 DIAGNOSIS — G89.29 OTHER CHRONIC PAIN: ICD-10-CM

## 2023-01-29 DIAGNOSIS — R00.2 PALPITATIONS: ICD-10-CM

## 2023-01-29 DIAGNOSIS — I48.91 ATRIAL FIBRILLATION WITH RVR: ICD-10-CM

## 2023-01-29 DIAGNOSIS — R45.851 SUICIDAL IDEATION: ICD-10-CM

## 2023-01-29 DIAGNOSIS — I48.91 A-FIB: ICD-10-CM

## 2023-01-29 DIAGNOSIS — I16.0 HYPERTENSIVE URGENCY: ICD-10-CM

## 2023-01-29 DIAGNOSIS — F03.90 DEMENTIA, UNSPECIFIED DEMENTIA SEVERITY, UNSPECIFIED DEMENTIA TYPE, UNSPECIFIED WHETHER BEHAVIORAL, PSYCHOTIC, OR MOOD DISTURBANCE OR ANXIETY: ICD-10-CM

## 2023-01-29 DIAGNOSIS — Z91.148 NONCOMPLIANCE WITH MEDICATIONS: ICD-10-CM

## 2023-01-29 LAB
ALBUMIN SERPL BCP-MCNC: 4.1 G/DL (ref 3.5–5.2)
ALP SERPL-CCNC: 56 U/L (ref 55–135)
ALT SERPL W/O P-5'-P-CCNC: 17 U/L (ref 10–44)
AMPHET+METHAMPHET UR QL: NEGATIVE
ANION GAP SERPL CALC-SCNC: 12 MMOL/L (ref 8–16)
AST SERPL-CCNC: 22 U/L (ref 10–40)
BARBITURATES UR QL SCN>200 NG/ML: NEGATIVE
BASOPHILS # BLD AUTO: 0.03 K/UL (ref 0–0.2)
BASOPHILS NFR BLD: 0.7 % (ref 0–1.9)
BENZODIAZ UR QL SCN>200 NG/ML: NEGATIVE
BILIRUB SERPL-MCNC: 0.5 MG/DL (ref 0.1–1)
BILIRUB UR QL STRIP: NEGATIVE
BNP SERPL-MCNC: 72 PG/ML (ref 0–99)
BUN SERPL-MCNC: 23 MG/DL (ref 8–23)
BZE UR QL SCN: NEGATIVE
CALCIUM SERPL-MCNC: 9.5 MG/DL (ref 8.7–10.5)
CANNABINOIDS UR QL SCN: NEGATIVE
CHLORIDE SERPL-SCNC: 107 MMOL/L (ref 95–110)
CLARITY UR: CLEAR
CO2 SERPL-SCNC: 24 MMOL/L (ref 23–29)
COLOR UR: COLORLESS
CREAT SERPL-MCNC: 1 MG/DL (ref 0.5–1.4)
CREAT UR-MCNC: 17.4 MG/DL (ref 23–375)
CTP QC/QA: YES
CTP QC/QA: YES
DIFFERENTIAL METHOD: ABNORMAL
EOSINOPHIL # BLD AUTO: 0.2 K/UL (ref 0–0.5)
EOSINOPHIL NFR BLD: 3.3 % (ref 0–8)
ERYTHROCYTE [DISTWIDTH] IN BLOOD BY AUTOMATED COUNT: 13.5 % (ref 11.5–14.5)
EST. GFR  (NO RACE VARIABLE): >60 ML/MIN/1.73 M^2
GLUCOSE SERPL-MCNC: 88 MG/DL (ref 70–110)
GLUCOSE UR QL STRIP: NEGATIVE
HCT VFR BLD AUTO: 39.8 % (ref 40–54)
HGB BLD-MCNC: 13.7 G/DL (ref 14–18)
HGB UR QL STRIP: ABNORMAL
IMM GRANULOCYTES # BLD AUTO: 0.03 K/UL (ref 0–0.04)
IMM GRANULOCYTES NFR BLD AUTO: 0.7 % (ref 0–0.5)
KETONES UR QL STRIP: NEGATIVE
LEUKOCYTE ESTERASE UR QL STRIP: NEGATIVE
LYMPHOCYTES # BLD AUTO: 1.2 K/UL (ref 1–4.8)
LYMPHOCYTES NFR BLD: 27.1 % (ref 18–48)
MCH RBC QN AUTO: 31.9 PG (ref 27–31)
MCHC RBC AUTO-ENTMCNC: 34.4 G/DL (ref 32–36)
MCV RBC AUTO: 93 FL (ref 82–98)
METHADONE UR QL SCN>300 NG/ML: NEGATIVE
MONOCYTES # BLD AUTO: 0.2 K/UL (ref 0.3–1)
MONOCYTES NFR BLD: 5.3 % (ref 4–15)
NEUTROPHILS # BLD AUTO: 2.9 K/UL (ref 1.8–7.7)
NEUTROPHILS NFR BLD: 62.9 % (ref 38–73)
NITRITE UR QL STRIP: NEGATIVE
NRBC BLD-RTO: 0 /100 WBC
OPIATES UR QL SCN: NEGATIVE
PCP UR QL SCN>25 NG/ML: NEGATIVE
PH UR STRIP: 6 [PH] (ref 5–8)
PLATELET # BLD AUTO: 187 K/UL (ref 150–450)
PMV BLD AUTO: 8.6 FL (ref 9.2–12.9)
POC MOLECULAR INFLUENZA A AGN: NEGATIVE
POC MOLECULAR INFLUENZA B AGN: NEGATIVE
POTASSIUM SERPL-SCNC: 4.4 MMOL/L (ref 3.5–5.1)
PROT SERPL-MCNC: 7.2 G/DL (ref 6–8.4)
PROT UR QL STRIP: NEGATIVE
RBC # BLD AUTO: 4.3 M/UL (ref 4.6–6.2)
SARS-COV-2 RDRP RESP QL NAA+PROBE: NEGATIVE
SODIUM SERPL-SCNC: 143 MMOL/L (ref 136–145)
SP GR UR STRIP: 1 (ref 1–1.03)
TOXICOLOGY INFORMATION: ABNORMAL
TROPONIN I SERPL DL<=0.01 NG/ML-MCNC: 0.01 NG/ML (ref 0–0.03)
TROPONIN I SERPL DL<=0.01 NG/ML-MCNC: <0.006 NG/ML (ref 0–0.03)
URN SPEC COLLECT METH UR: ABNORMAL
UROBILINOGEN UR STRIP-ACNC: NEGATIVE EU/DL
WBC # BLD AUTO: 4.57 K/UL (ref 3.9–12.7)

## 2023-01-29 PROCEDURE — 80307 DRUG TEST PRSMV CHEM ANLYZR: CPT | Performed by: EMERGENCY MEDICINE

## 2023-01-29 PROCEDURE — 25000003 PHARM REV CODE 250: Performed by: EMERGENCY MEDICINE

## 2023-01-29 PROCEDURE — 63600175 PHARM REV CODE 636 W HCPCS: Performed by: EMERGENCY MEDICINE

## 2023-01-29 PROCEDURE — 80053 COMPREHEN METABOLIC PANEL: CPT | Performed by: EMERGENCY MEDICINE

## 2023-01-29 PROCEDURE — 96375 TX/PRO/DX INJ NEW DRUG ADDON: CPT

## 2023-01-29 PROCEDURE — 93010 ELECTROCARDIOGRAM REPORT: CPT | Mod: ,,, | Performed by: INTERNAL MEDICINE

## 2023-01-29 PROCEDURE — 93010 EKG 12-LEAD: ICD-10-PCS | Mod: 76,,, | Performed by: INTERNAL MEDICINE

## 2023-01-29 PROCEDURE — G0425 INPT/ED TELECONSULT30: HCPCS | Mod: 95,GC,, | Performed by: PSYCHIATRY & NEUROLOGY

## 2023-01-29 PROCEDURE — 83880 ASSAY OF NATRIURETIC PEPTIDE: CPT | Performed by: EMERGENCY MEDICINE

## 2023-01-29 PROCEDURE — 81003 URINALYSIS AUTO W/O SCOPE: CPT | Mod: 59 | Performed by: EMERGENCY MEDICINE

## 2023-01-29 PROCEDURE — 96376 TX/PRO/DX INJ SAME DRUG ADON: CPT

## 2023-01-29 PROCEDURE — 93005 ELECTROCARDIOGRAM TRACING: CPT

## 2023-01-29 PROCEDURE — 12000002 HC ACUTE/MED SURGE SEMI-PRIVATE ROOM

## 2023-01-29 PROCEDURE — 84484 ASSAY OF TROPONIN QUANT: CPT | Performed by: EMERGENCY MEDICINE

## 2023-01-29 PROCEDURE — 85025 COMPLETE CBC W/AUTO DIFF WBC: CPT | Performed by: EMERGENCY MEDICINE

## 2023-01-29 PROCEDURE — 96365 THER/PROPH/DIAG IV INF INIT: CPT

## 2023-01-29 PROCEDURE — G0425 PR INPT TELEHEALTH CONSULT 30M: ICD-10-PCS | Mod: 95,GC,, | Performed by: PSYCHIATRY & NEUROLOGY

## 2023-01-29 PROCEDURE — 99291 CRITICAL CARE FIRST HOUR: CPT | Mod: 25

## 2023-01-29 PROCEDURE — 87635 SARS-COV-2 COVID-19 AMP PRB: CPT | Performed by: EMERGENCY MEDICINE

## 2023-01-29 RX ORDER — DILTIAZEM HCL 1 MG/ML
5 INJECTION, SOLUTION INTRAVENOUS CONTINUOUS
Status: DISCONTINUED | OUTPATIENT
Start: 2023-01-29 | End: 2023-01-30

## 2023-01-29 RX ORDER — LANOLIN ALCOHOL/MO/W.PET/CERES
800 CREAM (GRAM) TOPICAL
Status: DISCONTINUED | OUTPATIENT
Start: 2023-01-30 | End: 2023-01-31 | Stop reason: HOSPADM

## 2023-01-29 RX ORDER — KETOROLAC TROMETHAMINE 30 MG/ML
15 INJECTION, SOLUTION INTRAMUSCULAR; INTRAVENOUS
Status: COMPLETED | OUTPATIENT
Start: 2023-01-29 | End: 2023-01-29

## 2023-01-29 RX ORDER — HYDRALAZINE HYDROCHLORIDE 20 MG/ML
10 INJECTION INTRAMUSCULAR; INTRAVENOUS
Status: COMPLETED | OUTPATIENT
Start: 2023-01-29 | End: 2023-01-29

## 2023-01-29 RX ORDER — METHOCARBAMOL 500 MG/1
1000 TABLET, FILM COATED ORAL 3 TIMES DAILY
Qty: 30 TABLET | Refills: 0 | Status: SHIPPED | OUTPATIENT
Start: 2023-01-29 | End: 2023-02-03

## 2023-01-29 RX ORDER — TALC
6 POWDER (GRAM) TOPICAL NIGHTLY PRN
Status: DISCONTINUED | OUTPATIENT
Start: 2023-01-30 | End: 2023-01-31 | Stop reason: HOSPADM

## 2023-01-29 RX ORDER — HYDROCHLOROTHIAZIDE 25 MG/1
25 TABLET ORAL
COMMUNITY
Start: 2022-10-24

## 2023-01-29 RX ORDER — ONDANSETRON 2 MG/ML
4 INJECTION INTRAMUSCULAR; INTRAVENOUS EVERY 8 HOURS PRN
Status: DISCONTINUED | OUTPATIENT
Start: 2023-01-30 | End: 2023-01-31 | Stop reason: HOSPADM

## 2023-01-29 RX ORDER — HYDROCODONE BITARTRATE AND ACETAMINOPHEN 10; 325 MG/1; MG/1
1 TABLET ORAL EVERY 4 HOURS PRN
Status: DISCONTINUED | OUTPATIENT
Start: 2023-01-30 | End: 2023-01-29

## 2023-01-29 RX ORDER — DILTIAZEM HYDROCHLORIDE 5 MG/ML
0.25 INJECTION INTRAVENOUS
Status: COMPLETED | OUTPATIENT
Start: 2023-01-29 | End: 2023-01-29

## 2023-01-29 RX ORDER — IPRATROPIUM BROMIDE AND ALBUTEROL SULFATE 2.5; .5 MG/3ML; MG/3ML
3 SOLUTION RESPIRATORY (INHALATION) EVERY 4 HOURS PRN
Status: DISCONTINUED | OUTPATIENT
Start: 2023-01-30 | End: 2023-01-31 | Stop reason: HOSPADM

## 2023-01-29 RX ORDER — ACETAMINOPHEN 500 MG
500 TABLET ORAL EVERY 6 HOURS PRN
Qty: 30 TABLET | Refills: 0 | Status: SHIPPED | OUTPATIENT
Start: 2023-01-29

## 2023-01-29 RX ORDER — SODIUM,POTASSIUM PHOSPHATES 280-250MG
2 POWDER IN PACKET (EA) ORAL
Status: DISCONTINUED | OUTPATIENT
Start: 2023-01-30 | End: 2023-01-31 | Stop reason: HOSPADM

## 2023-01-29 RX ORDER — METOPROLOL SUCCINATE 50 MG/1
50 TABLET, EXTENDED RELEASE ORAL 2 TIMES DAILY
Status: DISCONTINUED | OUTPATIENT
Start: 2023-01-30 | End: 2023-01-30

## 2023-01-29 RX ORDER — ATORVASTATIN CALCIUM 40 MG/1
80 TABLET, FILM COATED ORAL DAILY
Status: DISCONTINUED | OUTPATIENT
Start: 2023-01-30 | End: 2023-01-31 | Stop reason: HOSPADM

## 2023-01-29 RX ORDER — AMLODIPINE BESYLATE 5 MG/1
5 TABLET ORAL
Status: COMPLETED | OUTPATIENT
Start: 2023-01-29 | End: 2023-01-29

## 2023-01-29 RX ORDER — ACETAMINOPHEN 500 MG
1000 TABLET ORAL
Status: DISCONTINUED | OUTPATIENT
Start: 2023-01-29 | End: 2023-01-30

## 2023-01-29 RX ORDER — AMLODIPINE BESYLATE 5 MG/1
5 TABLET ORAL DAILY
Qty: 60 TABLET | Refills: 0 | Status: SHIPPED | OUTPATIENT
Start: 2023-01-29 | End: 2024-01-29

## 2023-01-29 RX ORDER — ACETAMINOPHEN 325 MG/1
650 TABLET ORAL EVERY 4 HOURS PRN
Status: DISCONTINUED | OUTPATIENT
Start: 2023-01-30 | End: 2023-01-31 | Stop reason: HOSPADM

## 2023-01-29 RX ORDER — SODIUM CHLORIDE 9 MG/ML
1000 INJECTION, SOLUTION INTRAVENOUS
Status: COMPLETED | OUTPATIENT
Start: 2023-01-29 | End: 2023-01-30

## 2023-01-29 RX ORDER — SODIUM CHLORIDE 0.9 % (FLUSH) 0.9 %
10 SYRINGE (ML) INJECTION
Status: DISCONTINUED | OUTPATIENT
Start: 2023-01-30 | End: 2023-01-31 | Stop reason: HOSPADM

## 2023-01-29 RX ORDER — HYDROCODONE BITARTRATE AND ACETAMINOPHEN 5; 325 MG/1; MG/1
1 TABLET ORAL EVERY 4 HOURS PRN
Status: DISCONTINUED | OUTPATIENT
Start: 2023-01-30 | End: 2023-01-29

## 2023-01-29 RX ORDER — VALPROIC ACID 250 MG/1
250 CAPSULE, LIQUID FILLED ORAL EVERY 8 HOURS PRN
Status: DISCONTINUED | OUTPATIENT
Start: 2023-01-30 | End: 2023-01-31 | Stop reason: HOSPADM

## 2023-01-29 RX ORDER — LIDOCAINE 50 MG/G
1 PATCH TOPICAL DAILY
Qty: 12 PATCH | Refills: 0 | Status: SHIPPED | OUTPATIENT
Start: 2023-01-29

## 2023-01-29 RX ORDER — FAMOTIDINE 10 MG/ML
20 INJECTION INTRAVENOUS DAILY
Status: DISCONTINUED | OUTPATIENT
Start: 2023-01-30 | End: 2023-01-31 | Stop reason: HOSPADM

## 2023-01-29 RX ORDER — PROCHLORPERAZINE EDISYLATE 5 MG/ML
5 INJECTION INTRAMUSCULAR; INTRAVENOUS EVERY 6 HOURS PRN
Status: DISCONTINUED | OUTPATIENT
Start: 2023-01-30 | End: 2023-01-31 | Stop reason: HOSPADM

## 2023-01-29 RX ADMIN — AMLODIPINE BESYLATE 5 MG: 5 TABLET ORAL at 03:01

## 2023-01-29 RX ADMIN — SODIUM CHLORIDE 1000 ML: 9 INJECTION, SOLUTION INTRAVENOUS at 10:01

## 2023-01-29 RX ADMIN — DILTIAZEM HYDROCHLORIDE 17 MG: 5 INJECTION INTRAVENOUS at 10:01

## 2023-01-29 RX ADMIN — KETOROLAC TROMETHAMINE 15 MG: 30 INJECTION, SOLUTION INTRAMUSCULAR; INTRAVENOUS at 05:01

## 2023-01-29 RX ADMIN — HYDRALAZINE HYDROCHLORIDE 10 MG: 20 INJECTION INTRAMUSCULAR; INTRAVENOUS at 05:01

## 2023-01-29 RX ADMIN — HYDRALAZINE HYDROCHLORIDE 10 MG: 20 INJECTION INTRAMUSCULAR; INTRAVENOUS at 02:01

## 2023-01-29 RX ADMIN — DILTIAZEM HYDROCHLORIDE 5 MG/HR: 5 INJECTION, SOLUTION INTRAVENOUS at 10:01
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
## 2023-01-29 NOTE — Clinical Note
Diagnosis: Atrial fibrillation with RVR [153625]   Admitting Provider:: VAMSHI RUIZ [[id number]]   Future Attending Provider: VAMSHI RUIZ [[id number]]   Reason for IP Medical Treatment  (Clinical interventions that can only be accomplished in the IP setting? ) :: AFib with RVR, uncontrolled hypertension, and suicidal ideation.   Estimated Length of Stay:: 2 midnights   I certify that Inpatient services for greater than or equal to 2 midnights are medically necessary:: Yes   Plans for Post-Acute care--if anticipated (pick the single best option):: A. No post acute care anticipated at this time   Special Needs:: Fall Risk [15]

## 2023-01-29 NOTE — ED TRIAGE NOTES
"Pt reported to ED via EMS with c/o of high blood pressure. Per EMS pt is noncompliant and "takes medications on and off." Has not taken in a couple of days now. C/o of generalized HA. AAOx4, all vitals stable a this time with exception of elevated /100    "

## 2023-01-29 NOTE — DISCHARGE INSTRUCTIONS
Please monitor your blood pressure at home daily.  Please record the values and follow up with her primary care in 1 day.  Take your blood pressure medicine every day and do not miss a dose.

## 2023-01-30 PROBLEM — I10 PRIMARY HYPERTENSION: Status: ACTIVE | Noted: 2023-01-30

## 2023-01-30 LAB
ANION GAP SERPL CALC-SCNC: 8 MMOL/L (ref 8–16)
BASOPHILS # BLD AUTO: 0.04 K/UL (ref 0–0.2)
BASOPHILS NFR BLD: 0.7 % (ref 0–1.9)
BUN SERPL-MCNC: 25 MG/DL (ref 8–23)
CALCIUM SERPL-MCNC: 8.4 MG/DL (ref 8.7–10.5)
CHLORIDE SERPL-SCNC: 109 MMOL/L (ref 95–110)
CO2 SERPL-SCNC: 20 MMOL/L (ref 23–29)
CREAT SERPL-MCNC: 0.9 MG/DL (ref 0.5–1.4)
DIFFERENTIAL METHOD: ABNORMAL
EOSINOPHIL # BLD AUTO: 0.1 K/UL (ref 0–0.5)
EOSINOPHIL NFR BLD: 2 % (ref 0–8)
ERYTHROCYTE [DISTWIDTH] IN BLOOD BY AUTOMATED COUNT: 13.7 % (ref 11.5–14.5)
EST. GFR  (NO RACE VARIABLE): >60 ML/MIN/1.73 M^2
GLUCOSE SERPL-MCNC: 102 MG/DL (ref 70–110)
HCT VFR BLD AUTO: 39.4 % (ref 40–54)
HGB BLD-MCNC: 13.5 G/DL (ref 14–18)
IMM GRANULOCYTES # BLD AUTO: 0.02 K/UL (ref 0–0.04)
IMM GRANULOCYTES NFR BLD AUTO: 0.3 % (ref 0–0.5)
LYMPHOCYTES # BLD AUTO: 1.4 K/UL (ref 1–4.8)
LYMPHOCYTES NFR BLD: 24.2 % (ref 18–48)
MAGNESIUM SERPL-MCNC: 2 MG/DL (ref 1.6–2.6)
MCH RBC QN AUTO: 32.1 PG (ref 27–31)
MCHC RBC AUTO-ENTMCNC: 34.3 G/DL (ref 32–36)
MCV RBC AUTO: 94 FL (ref 82–98)
MONOCYTES # BLD AUTO: 0.4 K/UL (ref 0.3–1)
MONOCYTES NFR BLD: 6.9 % (ref 4–15)
NEUTROPHILS # BLD AUTO: 3.9 K/UL (ref 1.8–7.7)
NEUTROPHILS NFR BLD: 65.9 % (ref 38–73)
NRBC BLD-RTO: 0 /100 WBC
PHOSPHATE SERPL-MCNC: 3.3 MG/DL (ref 2.7–4.5)
PLATELET # BLD AUTO: 192 K/UL (ref 150–450)
PMV BLD AUTO: 9 FL (ref 9.2–12.9)
POTASSIUM SERPL-SCNC: 4 MMOL/L (ref 3.5–5.1)
RBC # BLD AUTO: 4.21 M/UL (ref 4.6–6.2)
SODIUM SERPL-SCNC: 137 MMOL/L (ref 136–145)
TSH SERPL DL<=0.005 MIU/L-ACNC: 1.49 UIU/ML (ref 0.4–4)
WBC # BLD AUTO: 5.94 K/UL (ref 3.9–12.7)

## 2023-01-30 PROCEDURE — 99222 1ST HOSP IP/OBS MODERATE 55: CPT | Mod: ,,, | Performed by: INTERNAL MEDICINE

## 2023-01-30 PROCEDURE — 99222 PR INITIAL HOSPITAL CARE,LEVL II: ICD-10-PCS | Mod: ,,, | Performed by: NURSE PRACTITIONER

## 2023-01-30 PROCEDURE — 93005 ELECTROCARDIOGRAM TRACING: CPT

## 2023-01-30 PROCEDURE — 99222 1ST HOSP IP/OBS MODERATE 55: CPT | Mod: ,,, | Performed by: NURSE PRACTITIONER

## 2023-01-30 PROCEDURE — 99222 PR INITIAL HOSPITAL CARE,LEVL II: ICD-10-PCS | Mod: ,,, | Performed by: INTERNAL MEDICINE

## 2023-01-30 PROCEDURE — 25000003 PHARM REV CODE 250: Performed by: STUDENT IN AN ORGANIZED HEALTH CARE EDUCATION/TRAINING PROGRAM

## 2023-01-30 PROCEDURE — 93010 ELECTROCARDIOGRAM REPORT: CPT | Mod: ,,, | Performed by: INTERNAL MEDICINE

## 2023-01-30 PROCEDURE — 63600175 PHARM REV CODE 636 W HCPCS: Performed by: STUDENT IN AN ORGANIZED HEALTH CARE EDUCATION/TRAINING PROGRAM

## 2023-01-30 PROCEDURE — 84100 ASSAY OF PHOSPHORUS: CPT | Performed by: STUDENT IN AN ORGANIZED HEALTH CARE EDUCATION/TRAINING PROGRAM

## 2023-01-30 PROCEDURE — 83735 ASSAY OF MAGNESIUM: CPT | Performed by: STUDENT IN AN ORGANIZED HEALTH CARE EDUCATION/TRAINING PROGRAM

## 2023-01-30 PROCEDURE — 20000000 HC ICU ROOM

## 2023-01-30 PROCEDURE — A4216 STERILE WATER/SALINE, 10 ML: HCPCS | Performed by: STUDENT IN AN ORGANIZED HEALTH CARE EDUCATION/TRAINING PROGRAM

## 2023-01-30 PROCEDURE — 80048 BASIC METABOLIC PNL TOTAL CA: CPT | Performed by: STUDENT IN AN ORGANIZED HEALTH CARE EDUCATION/TRAINING PROGRAM

## 2023-01-30 PROCEDURE — 84443 ASSAY THYROID STIM HORMONE: CPT | Performed by: STUDENT IN AN ORGANIZED HEALTH CARE EDUCATION/TRAINING PROGRAM

## 2023-01-30 PROCEDURE — 93010 EKG 12-LEAD: ICD-10-PCS | Mod: ,,, | Performed by: INTERNAL MEDICINE

## 2023-01-30 PROCEDURE — 85025 COMPLETE CBC W/AUTO DIFF WBC: CPT | Performed by: STUDENT IN AN ORGANIZED HEALTH CARE EDUCATION/TRAINING PROGRAM

## 2023-01-30 RX ORDER — HALOPERIDOL 5 MG/ML
5 INJECTION INTRAMUSCULAR EVERY 6 HOURS PRN
Status: DISCONTINUED | OUTPATIENT
Start: 2023-01-30 | End: 2023-01-31 | Stop reason: HOSPADM

## 2023-01-30 RX ORDER — MUPIROCIN 20 MG/G
OINTMENT TOPICAL 2 TIMES DAILY
Status: DISCONTINUED | OUTPATIENT
Start: 2023-01-30 | End: 2023-01-31 | Stop reason: HOSPADM

## 2023-01-30 RX ORDER — CARVEDILOL 6.25 MG/1
6.25 TABLET ORAL 2 TIMES DAILY WITH MEALS
Status: DISCONTINUED | OUTPATIENT
Start: 2023-01-30 | End: 2023-01-31 | Stop reason: HOSPADM

## 2023-01-30 RX ORDER — SERTRALINE HYDROCHLORIDE 25 MG/1
25 TABLET, FILM COATED ORAL DAILY
Status: DISCONTINUED | OUTPATIENT
Start: 2023-01-30 | End: 2023-01-31 | Stop reason: HOSPADM

## 2023-01-30 RX ADMIN — CARVEDILOL 6.25 MG: 6.25 TABLET, FILM COATED ORAL at 05:01

## 2023-01-30 RX ADMIN — MELATONIN TAB 3 MG 6 MG: 3 TAB at 10:01

## 2023-01-30 RX ADMIN — VALPROIC ACID 250 MG: 250 CAPSULE ORAL at 03:01

## 2023-01-30 RX ADMIN — FAMOTIDINE 20 MG: 10 INJECTION INTRAVENOUS at 09:01

## 2023-01-30 RX ADMIN — HALOPERIDOL LACTATE 5 MG: 5 INJECTION, SOLUTION INTRAMUSCULAR at 05:01

## 2023-01-30 RX ADMIN — HALOPERIDOL LACTATE 5 MG: 5 INJECTION, SOLUTION INTRAMUSCULAR at 10:01

## 2023-01-30 RX ADMIN — MELATONIN TAB 3 MG 6 MG: 3 TAB at 12:01

## 2023-01-30 RX ADMIN — ATORVASTATIN CALCIUM 80 MG: 40 TABLET, FILM COATED ORAL at 09:01

## 2023-01-30 RX ADMIN — APIXABAN 5 MG: 5 TABLET, FILM COATED ORAL at 10:01

## 2023-01-30 RX ADMIN — APIXABAN 5 MG: 5 TABLET, FILM COATED ORAL at 09:01

## 2023-01-30 RX ADMIN — MUPIROCIN: 20 OINTMENT TOPICAL at 09:01

## 2023-01-30 RX ADMIN — CARVEDILOL 6.25 MG: 6.25 TABLET, FILM COATED ORAL at 09:01

## 2023-01-30 RX ADMIN — SERTRALINE HYDROCHLORIDE 25 MG: 25 TABLET ORAL at 01:01

## 2023-01-30 RX ADMIN — Medication 10 ML: at 10:01

## 2023-01-30 NOTE — ED NOTES
Previous sitter called and faxed PEC to  office,(Summer) also PEC scanned into pt chart   by registration.

## 2023-01-30 NOTE — SUBJECTIVE & OBJECTIVE
Past Medical History:   Diagnosis Date    Chronic kidney disease     Fibromyalgia     Fibromyalgia     HSV (herpes simplex virus) anogenital infection     Hypertension     does not take medication    Mental disorder     history of depression       Past Surgical History:   Procedure Laterality Date    APPENDECTOMY      Collapse Lung  1969    GUM SURGERY      HERNIA REPAIR      Left Shoulder      tonsilect      TONSILLECTOMY, ADENOIDECTOMY         Review of patient's allergies indicates:   Allergen Reactions    Cymbalta [duloxetine] Other (See Comments)     Kidney failure    Iodine and iodide containing products Other (See Comments)     Chest, bladder pain    Latex, natural rubber Other (See Comments)     unknown    Asa [aspirin]      No rash, but feels bad when he takes it       No current facility-administered medications on file prior to encounter.     Current Outpatient Medications on File Prior to Encounter   Medication Sig    apixaban (ELIQUIS) 5 mg Tab Take 1 tablet (5 mg total) by mouth 2 (two) times daily.    hydroCHLOROthiazide (HYDRODIURIL) 25 MG tablet Take 25 mg by mouth.    metoprolol succinate (TOPROL-XL) 50 MG 24 hr tablet Take 1 tablet (50 mg total) by mouth 2 (two) times daily.    rosuvastatin (CRESTOR) 40 MG Tab Take 1 tablet (40 mg total) by mouth every evening.     Family History       Problem Relation (Age of Onset)    Cancer Father, Brother    Heart disease Mother, Brother    Liver disease Sister    Neuropathy Sister    Stroke Mother          Tobacco Use    Smoking status: Former     Types: Cigarettes     Start date: 1952     Quit date: 4/10/1983     Years since quittin.8    Smokeless tobacco: Never   Substance and Sexual Activity    Alcohol use: No     Comment: Heavy at one time.    Drug use: No    Sexual activity: Never     Review of Systems   Unable to perform ROS: Psychiatric disorder   Objective:     Vital Signs (Most Recent):  Temp: 97.9 °F (36.6 °C) (23 1246)  Pulse: 105  (01/29/23 2345)  Resp: 16 (01/29/23 2345)  BP: 119/69 (01/29/23 2345)  SpO2: 98 % (01/29/23 2345) Vital Signs (24h Range):  Temp:  [97.9 °F (36.6 °C)] 97.9 °F (36.6 °C)  Pulse:  [] 105  Resp:  [14-30] 16  SpO2:  [93 %-98 %] 98 %  BP: ()/() 119/69     Weight: 68 kg (150 lb)  Body mass index is 22.15 kg/m².    Physical Exam  Vitals and nursing note reviewed.   Constitutional:       Appearance: He is ill-appearing.   HENT:      Head: Normocephalic and atraumatic.      Nose: Nose normal.      Mouth/Throat:      Mouth: Mucous membranes are moist.   Eyes:      Extraocular Movements: Extraocular movements intact.   Cardiovascular:      Rate and Rhythm: Tachycardia present. Rhythm irregular.      Pulses: Normal pulses.      Heart sounds: No murmur heard.  Pulmonary:      Effort: Pulmonary effort is normal. No respiratory distress.   Abdominal:      General: Abdomen is flat.      Palpations: Abdomen is soft.      Tenderness: There is no abdominal tenderness.   Musculoskeletal:      Right lower leg: No edema.      Left lower leg: No edema.   Skin:     General: Skin is warm.      Capillary Refill: Capillary refill takes less than 2 seconds.   Neurological:      Mental Status: He is alert.   Psychiatric:         Speech: Speech is tangential.         Behavior: Behavior normal.           Significant Labs: All pertinent labs within the past 24 hours have been reviewed.    Significant Imaging: I have reviewed all pertinent imaging results/findings within the past 24 hours.

## 2023-01-30 NOTE — HPI
"HPI: This is an 82 year old male with a PMHx of Afib (on Eliquis), HTN, CVA, HLD, dementia who presented with malaise/high blood pressure.    Patient initially presented to the ER for hypertension, myalgias, insomnia and rhinorrhea. He is a poor historian, and most history was obtained from the chart. He reports non compliance of his medications they make him feel bad. Family is concerned that his dementia is getting worse and he is unable to take care of himself. He also reports having suicidal ideations; stating "why would I want to live if my health is like this". He denies having any previous attempts or having a plan for suicide. While in the ED, he went was initially hypertensive (229/100), later went into afib with RVR (120s), and had transient hypotension. Labs were unremarkable. CXR/CT head showed no acute process. Psychiatry was consulted given concern for suicidal ideation and recommended inpatient psychiatric hospitalization. He was given diltiazem 17 mg IV, hydralazine 10 mg IV x2, toradol 15 mg IV, amlodipine 5 mg and fluids. He was started on a diltiazem ggt and was admitted to the ICU.      "

## 2023-01-30 NOTE — ED NOTES
Cardiologist Dr. Whittaker at patient bedside.       Verbal order to change NPO status to Cardiac diet.

## 2023-01-30 NOTE — HPI
Kierra Daugherty is an 83yo male who presents with elevated blood pressure 229/100. Subsequently noted to go into atrial fibrillation.  He has a history of poorly-controlled hypertension.  Paroxysmal atrial fibrillation.  He was seen as a consultation in October 2022.  Patient was given diltiazem for his blood pressure as well as heart rate control.  He also received hydralazine and amlodipine.  Last blood pressure showed a systolic of 129.  History of stroke.  Question of dementia.  Chronic microvascular ischemic changes noted on CT.  Outpatient regimen shows that he is on Eliquis.     Echocardiogram 10/21/2022:    Technically difficult study.  Atrial fibrillation observed.  The left ventricle is normal in size with concentric remodeling and  The estimated ejection fraction is 55%.  Normal right ventricular size with normal right ventricular systolic function.  Normal central venous pressure (3 mmHg).  The estimated PA systolic pressure is 26 mmHg.  There is no pulmonary hypertension.     Echocardiogram 04/29/2022:     CONCLUSIONS     Mild left ventricular hypertrophy.     Hyperdynamic left ventricular systolic function.     Left ventricular ejection fraction is estimated at >65%.     Mild mitral annular calcification.     Aortic valve sclerosis without stenosis.     Mild tricuspid valve regurgitation.     Mild pulmonary valve regurgitation.     Bubble study performed which appeared to be negative at rest and wtih valsalva.      Carotid ultrasound 04/29/2022:     1.   Moderate stenosis of the left ICA. No high-grade stenosis of the right ICA.   2.   Moderate atherosclerotic plaque of bilateral cervical carotid arterial systems.   3.   Forward flow both vertebral arteries.         MRI brain 04/27/2022:     IMPRESSION:     Tiny acute lacunar infarct within the left frontal horn periventricular white matter propagating into the corona radiata. No acute large territorial infarct.     Background cerebral atrophy, chronic  microangiopathy, and multiple old lacunar infarcts.

## 2023-01-30 NOTE — ED NOTES
Received report from CHRISTOPHER Armenta. RN introduced self at patient bedside. PT alert and oriented to self. PT has slight confusion of location and situation due to administration of haldol prior to RN arrival. Vital signs reassessed and stable. 96% on RA. No acute distress noted. PT denies any pain at this time. Denies SI/hI at this time. Will proceed with admission.    R/AC IV and L/FA IV clean, patent, dry, and intact.    Sitter at patient bedside. Telemetry maintained for medical purposes with NSR. Bed in lowest position. Side rails up x2.

## 2023-01-30 NOTE — PHARMACY MED REC
"Admission Medication History     The home medication history was taken by Gela Cheatham.    You may go to "Admission" then "Reconcile Home Medications" tabs to review and/or act upon these items.     The home medication list has been updated by the Pharmacy department.   Please read ALL comments highlighted in yellow.   Please address this information as you see fit.    Feel free to contact us if you have any questions or require assistance.    Medications listed below were obtained from: Patient/family and Analytic software- [employer] and added to home medication:   Hydrochlorothiazide    Potential issues to be addressed PRIOR TO DISCHARGE  Please discuss with the patient barriers to adherence with medication treatment plans  Patient requires education regarding drug therapies     The medication reconciliation was completed by the patient's bedside; patient does not recall the last time or name of medication.      Gela Cheatham  [phone]              .        "

## 2023-01-30 NOTE — PROVIDER PROGRESS NOTES - EMERGENCY DEPT.
Encounter Date: 1/29/2023    ED Physician Progress Notes       SCRIBE NOTE: I, Debra Logan, am scribing for, and in the presence of,  Nimo Warner DO.  Physician Statement: I, Nimo Warner DO, personally performed the services described in this documentation as scribed by Debra Logan in my presence, and it is both accurate and complete.   Physician Note:   Patient was waiting for psych evaluation when he had change in vital signs. HR in 130s, BP with significant drop. EKG done at 22:22 shows: No STEMI. Rate of 153. Atrial fibrillation with RVR. Normal Axis. Abnormal EKG. QTc normal at 444. When compared to prior EKG rate increased by 92 bpm.

## 2023-01-30 NOTE — PLAN OF CARE
Problem: Adult Inpatient Plan of Care  Goal: Plan of Care Review  Outcome: Ongoing, Progressing  Goal: Patient-Specific Goal (Individualized)  Outcome: Ongoing, Progressing  Goal: Absence of Hospital-Acquired Illness or Injury  Outcome: Ongoing, Progressing  Goal: Optimal Comfort and Wellbeing  Outcome: Ongoing, Progressing  Goal: Readiness for Transition of Care  Outcome: Ongoing, Progressing     Problem: Coping Ineffective  Goal: Effective Coping  Outcome: Ongoing, Progressing     Problem: Skin Injury Risk Increased  Goal: Skin Health and Integrity  Outcome: Ongoing, Progressing

## 2023-01-30 NOTE — PROVIDER TRANSFER
PMHx of Afib (on Eliquis), HTN, CVA, HLD, dementia who presented with malaise/high blood pressure and in Afib RVR, severe depression with SI.    Dilt gtt stopped.   BP and HR stable now, back in NSR  Will step down to tele    Medically ready for discharge to psych facility      Will need Psych placement for SI  Sitter on floor  Change metoprolol to correg for better BP at discharge  Script for sertraline, note to titrate up at psych facility          Jaswinder Lockhart MD  Internal Medicine Staff

## 2023-01-30 NOTE — ASSESSMENT & PLAN NOTE
01/30/2023-converted to sinus.  On Eliquis as outpatient.  On Toprol for rate control as an outpatient.  Would continue.

## 2023-01-30 NOTE — SUBJECTIVE & OBJECTIVE
Past Medical History:   Diagnosis Date    Chronic kidney disease     Fibromyalgia     Fibromyalgia     HSV (herpes simplex virus) anogenital infection     Hypertension     does not take medication    Mental disorder     history of depression       Past Surgical History:   Procedure Laterality Date    APPENDECTOMY      Collapse Lung  1969    GUM SURGERY      HERNIA REPAIR      Left Shoulder      tonsilect      TONSILLECTOMY, ADENOIDECTOMY         Review of patient's allergies indicates:   Allergen Reactions    Cymbalta [duloxetine] Other (See Comments)     Kidney failure    Iodine and iodide containing products Other (See Comments)     Chest, bladder pain    Latex, natural rubber Other (See Comments)     unknown    Asa [aspirin]      No rash, but feels bad when he takes it       Medications:  Continuous Infusions:  Scheduled Meds:   apixaban  5 mg Oral BID    atorvastatin  80 mg Oral Daily    carvediloL  6.25 mg Oral BID WM    famotidine (PF)  20 mg Intravenous Daily     PRN Meds:acetaminophen, albuterol-ipratropium, haloperidol lactate, magnesium oxide, magnesium oxide, melatonin, ondansetron, potassium bicarbonate, potassium bicarbonate, potassium bicarbonate, potassium, sodium phosphates, potassium, sodium phosphates, potassium, sodium phosphates, prochlorperazine, sodium chloride 0.9%, valproic acid    Family History       Problem Relation (Age of Onset)    Cancer Father, Brother    Heart disease Mother, Brother    Liver disease Sister    Neuropathy Sister    Stroke Mother          Tobacco Use    Smoking status: Former     Types: Cigarettes     Start date: 1952     Quit date: 4/10/1983     Years since quittin.8    Smokeless tobacco: Never   Substance and Sexual Activity    Alcohol use: No     Comment: Heavy at one time.    Drug use: No    Sexual activity: Never       Review of Systems  Objective:     Vital Signs (Most Recent):  Temp: 97.6 °F (36.4 °C) (23 0908)  Pulse: 68 (23  1100)  Resp: 20 (01/30/23 1100)  BP: 134/61 (01/30/23 1100)  SpO2: 95 % (01/30/23 1100) Vital Signs (24h Range):  Temp:  [97.6 °F (36.4 °C)-98.1 °F (36.7 °C)] 97.6 °F (36.4 °C)  Pulse:  [] 68  Resp:  [14-30] 20  SpO2:  [92 %-98 %] 95 %  BP: ()/() 134/61     Weight: 69.8 kg (153 lb 14.1 oz)  Body mass index is 22.72 kg/m².    Physical Exam  Constitutional:       General: He is not in acute distress.     Appearance: He is not ill-appearing.   HENT:      Head: Normocephalic and atraumatic.   Pulmonary:      Effort: Pulmonary effort is normal.   Skin:     General: Skin is warm and dry.      Comments: tatooes   Neurological:      Mental Status: He is alert and oriented to person, place, and time.       Review of Symptoms      Symptom Assessment (ESAS 0-10 Scale)  Pain:  0  Dyspnea:  0  Anxiety:  0  Nausea:  0  Depression:  0  Anorexia:  0  Fatigue:  0  Insomnia:  0  Restlessness:  0  Agitation:  0         Psychosocial/Cultural:   See Palliative Psychosocial Note: Yes  Patient lives alone, no spouse and no children. Reports his nieces and nephew help him.   **Primary  to Follow**  Palliative Care  Consult: No      Advance Care Planning   Advance Directives:     Decision Making:  Patient answered questions       Significant Labs: All pertinent labs within the past 24 hours have been reviewed.  CBC:   Recent Labs   Lab 01/30/23  0500   WBC 5.94   HGB 13.5*   HCT 39.4*   MCV 94        BMP:  Recent Labs   Lab 01/30/23  0500         K 4.0      CO2 20*   BUN 25*   CREATININE 0.9   CALCIUM 8.4*   MG 2.0     LFT:  Lab Results   Component Value Date    AST 22 01/29/2023    ALKPHOS 56 01/29/2023    BILITOT 0.5 01/29/2023     Albumin:   Albumin   Date Value Ref Range Status   01/29/2023 4.1 3.5 - 5.2 g/dL Final     Protein:   Total Protein   Date Value Ref Range Status   01/29/2023 7.2 6.0 - 8.4 g/dL Final     Lactic acid:   No results found for:  LACTATE    Significant Imaging: I have reviewed all pertinent imaging results/findings within the past 24 hours. CT head. CXR

## 2023-01-30 NOTE — H&P
"[facility]  [facility] Medicine  History & Physical    Patient Name: Kierra Daugherty  MRN: [id number]  Patient Class: IP- Inpatient  Admission Date: 1/29/2023  Attending Physician: Jaswinder Lockhart MD   Primary Care Provider: Antonieta Khalil MD         Patient information was obtained from patient and ER records.     Subjective:     Principal Problem:Atrial fibrillation with RVR    Chief Complaint:   Chief Complaint   Patient presents with    Hypertension     Presents to the ED via EMS with c/o HTN. Patient noncompliant with BP meds because they "make him feel bad". Currently reporting generalized HA. /100 in triage.         HPI: This is an 82 year old male with a PMHx of Afib (on Eliquis), HTN, CVA, HLD, dementia who presented with malaise/high blood pressure.     Patient initially presented to the ER for hypertension, myalgias, insomnia and rhinorrhea. He is a poor historian, and most history was obtained from the chart. He reports non compliance of his medications they make him feel bad. Family is concerned that his dementia is getting worse and he is unable to take care of himself. He also reports having suicidal ideations; stating "why would I want to live if my health is like this". He denies having any previous attempts or having a plan for suicide. While in the ED, he went was initially hypertensive (229/100), later went into afib with RVR (120s), and had transient hypotension. Labs were unremarkable. CXR/CT head showed no acute process. Psychiatry was consulted given concern for suicidal ideation and recommended inpatient psychiatric hospitalization. He was given diltiazem 17 mg IV, hydralazine 10 mg IV x2, toradol 15 mg IV, amlodipine 5 mg and fluids. He was started on a diltiazem ggt and was admitted to the ICU.       Past Medical History:   Diagnosis Date    Chronic kidney disease     Fibromyalgia     Fibromyalgia     HSV (herpes simplex virus) anogenital infection     Hypertension     " does not take medication    Mental disorder     history of depression       Past Surgical History:   Procedure Laterality Date    APPENDECTOMY      Collapse Lung  1969    GUM SURGERY      HERNIA REPAIR      Left Shoulder      tonsilect      TONSILLECTOMY, ADENOIDECTOMY         Review of patient's allergies indicates:   Allergen Reactions    Cymbalta [duloxetine] Other (See Comments)     Kidney failure    Iodine and iodide containing products Other (See Comments)     Chest, bladder pain    Latex, natural rubber Other (See Comments)     unknown    Asa [aspirin]      No rash, but feels bad when he takes it       No current facility-administered medications on file prior to encounter.     Current Outpatient Medications on File Prior to Encounter   Medication Sig    apixaban (ELIQUIS) 5 mg Tab Take 1 tablet (5 mg total) by mouth 2 (two) times daily.    hydroCHLOROthiazide (HYDRODIURIL) 25 MG tablet Take 25 mg by mouth.    metoprolol succinate (TOPROL-XL) 50 MG 24 hr tablet Take 1 tablet (50 mg total) by mouth 2 (two) times daily.    rosuvastatin (CRESTOR) 40 MG Tab Take 1 tablet (40 mg total) by mouth every evening.     Family History       Problem Relation (Age of Onset)    Cancer Father, Brother    Heart disease Mother, Brother    Liver disease Sister    Neuropathy Sister    Stroke Mother          Tobacco Use    Smoking status: Former     Types: Cigarettes     Start date: 1952     Quit date: 4/10/1983     Years since quittin.8    Smokeless tobacco: Never   Substance and Sexual Activity    Alcohol use: No     Comment: Heavy at one time.    Drug use: No    Sexual activity: Never     Review of Systems   Unable to perform ROS: Psychiatric disorder   Objective:     Vital Signs (Most Recent):  Temp: 97.9 °F (36.6 °C) (23 1246)  Pulse: 105 (23 234)  Resp: 16 (23)  BP: 119/69 (23 234)  SpO2: 98 % (23) Vital Signs (24h Range):  Temp:  [97.9 °F (36.6 °C)]  97.9 °F (36.6 °C)  Pulse:  [] 105  Resp:  [14-30] 16  SpO2:  [93 %-98 %] 98 %  BP: ()/() 119/69     Weight: 68 kg (150 lb)  Body mass index is 22.15 kg/m².    Physical Exam  Vitals and nursing note reviewed.   Constitutional:       Appearance: He is ill-appearing.   HENT:      Head: Normocephalic and atraumatic.      Nose: Nose normal.      Mouth/Throat:      Mouth: Mucous membranes are moist.   Eyes:      Extraocular Movements: Extraocular movements intact.   Cardiovascular:      Rate and Rhythm: Tachycardia present. Rhythm irregular.      Pulses: Normal pulses.      Heart sounds: No murmur heard.  Pulmonary:      Effort: Pulmonary effort is normal. No respiratory distress.   Abdominal:      General: Abdomen is flat.      Palpations: Abdomen is soft.      Tenderness: There is no abdominal tenderness.   Musculoskeletal:      Right lower leg: No edema.      Left lower leg: No edema.   Skin:     General: Skin is warm.      Capillary Refill: Capillary refill takes less than 2 seconds.   Neurological:      Mental Status: He is alert.   Psychiatric:         Speech: Speech is tangential.         Behavior: Behavior normal.           Significant Labs: All pertinent labs within the past 24 hours have been reviewed.    Significant Imaging: I have reviewed all pertinent imaging results/findings within the past 24 hours.    Assessment/Plan:     * Atrial fibrillation with RVR  [id number] Score: 3   Diltiazem ggt   Resume home medications   Cardiology consult  Telemetry monitoring     Primary hypertension  Resume home medications   Monitor     Suicidal ideation  Patient endorses suicidal ideation  Psychiatry evaluated in the ER; currently PEC'd  Inpatient psychiatric hospitalization once medically stable       History of CVA (cerebrovascular accident)  Resume home medications       Mixed hyperlipidemia  Resume home medications         VTE Risk Mitigation (From admission, onward)         Ordered     apixaban  tablet 5 mg  2 times daily         01/29/23 2332     IP VTE HIGH RISK PATIENT  Once         01/29/23 2332     Place sequential compression device  Until discontinued         01/29/23 2332                   Seamus Portillo MD  Department of Hospital Medicine   [facility] - Emergency Dept
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
## 2023-01-30 NOTE — SUBJECTIVE & OBJECTIVE
Past Medical History:   Diagnosis Date    Chronic kidney disease     Fibromyalgia     Fibromyalgia     HSV (herpes simplex virus) anogenital infection     Hypertension     does not take medication    Mental disorder     history of depression       Past Surgical History:   Procedure Laterality Date    APPENDECTOMY      Collapse Lung  1969    GUM SURGERY      HERNIA REPAIR      Left Shoulder      tonsilect      TONSILLECTOMY, ADENOIDECTOMY         Review of patient's allergies indicates:   Allergen Reactions    Cymbalta [duloxetine] Other (See Comments)     Kidney failure    Iodine and iodide containing products Other (See Comments)     Chest, bladder pain    Latex, natural rubber Other (See Comments)     unknown    Asa [aspirin]      No rash, but feels bad when he takes it       No current facility-administered medications on file prior to encounter.     Current Outpatient Medications on File Prior to Encounter   Medication Sig    apixaban (ELIQUIS) 5 mg Tab Take 1 tablet (5 mg total) by mouth 2 (two) times daily.    hydroCHLOROthiazide (HYDRODIURIL) 25 MG tablet Take 25 mg by mouth.    metoprolol succinate (TOPROL-XL) 50 MG 24 hr tablet Take 1 tablet (50 mg total) by mouth 2 (two) times daily.    rosuvastatin (CRESTOR) 40 MG Tab Take 1 tablet (40 mg total) by mouth every evening.     Family History       Problem Relation (Age of Onset)    Cancer Father, Brother    Heart disease Mother, Brother    Liver disease Sister    Neuropathy Sister    Stroke Mother          Tobacco Use    Smoking status: Former     Types: Cigarettes     Start date: 1951     Quit date: 4/10/1983     Years since quittin.8    Smokeless tobacco: Never   Substance and Sexual Activity    Alcohol use: No     Comment: Heavy at one time.    Drug use: No    Sexual activity: Never     Review of Systems   Unable to perform ROS: Dementia   Objective:     Vital Signs (Most Recent):  Temp: 98.1 °F (36.7 °C) (23 0706)  Pulse: 65 (23  0801)  Resp: 20 (01/30/23 0801)  BP: 128/60 (01/30/23 0801)  SpO2: 96 % (01/30/23 0801) Vital Signs (24h Range):  Temp:  [97.9 °F (36.6 °C)-98.1 °F (36.7 °C)] 98.1 °F (36.7 °C)  Pulse:  [] 65  Resp:  [14-30] 20  SpO2:  [92 %-98 %] 96 %  BP: ()/() 128/60     Weight: 68 kg (150 lb)  Body mass index is 22.15 kg/m².    SpO2: 96 %         Intake/Output Summary (Last 24 hours) at 1/30/2023 0807  Last data filed at 1/30/2023 0247  Gross per 24 hour   Intake 1000.12 ml   Output --   Net 1000.12 ml       Lines/Drains/Airways       Peripheral Intravenous Line  Duration                  Peripheral IV - Single Lumen 01/29/23 1247 20 G Left Hand <1 day         Peripheral IV - Single Lumen 01/29/23 1313 20 G Right Antecubital <1 day                    Physical Exam  Vitals reviewed.   Constitutional:       General: He is not in acute distress.     Appearance: He is not diaphoretic.   Neck:      Vascular: No carotid bruit or JVD.   Cardiovascular:      Rate and Rhythm: Normal rate and regular rhythm.      Pulses: Normal pulses.   Pulmonary:      Effort: Pulmonary effort is normal.      Breath sounds: Normal breath sounds.   Abdominal:      General: Bowel sounds are normal.      Palpations: Abdomen is soft.      Tenderness: There is no abdominal tenderness.   Musculoskeletal:      Right lower leg: No edema.      Left lower leg: No edema.   Neurological:      Mental Status: He is alert. He is disoriented and confused.      Motor: Motor function is intact.   Psychiatric:         Speech: Speech normal.         Cognition and Memory: Memory is impaired.       Significant Labs: CMP   Recent Labs   Lab 01/29/23  1313 01/30/23  0500    137   K 4.4 4.0    109   CO2 24 20*   GLU 88 102   BUN 23 25*   CREATININE 1.0 0.9   CALCIUM 9.5 8.4*   PROT 7.2  --    ALBUMIN 4.1  --    BILITOT 0.5  --    ALKPHOS 56  --    AST 22  --    ALT 17  --    ANIONGAP 12 8   , CBC   Recent Labs   Lab 01/29/23  1313 01/30/23  8930    WBC 4.57 5.94   HGB 13.7* 13.5*   HCT 39.8* 39.4*    192   , Lipid Panel No results for input(s): CHOL, HDL, LDLCALC, TRIG, CHOLHDL in the last 48 hours., and Troponin   Recent Labs   Lab 01/29/23  1313 01/29/23  1710   TROPONINI 0.010 <0.006       Significant Imaging: Echocardiogram: Transthoracic echo (TTE) complete (Cupid Only):   Results for orders placed or performed during the hospital encounter of 10/20/22   Echo   Result Value Ref Range    BSA 1.88 m2    LA WIDTH 3.60 cm    Left Ventricular Outflow Tract Mean Velocity 0.73 cm/s    Left Ventricular Outflow Tract Mean Gradient 2.24 mmHg    PV PEAK VELOCITY 0.68 cm/s    LVIDd 3.78 3.5 - 6.0 cm    IVS 0.94 0.6 - 1.1 cm    Posterior Wall 0.86 0.6 - 1.1 cm    Ao root annulus 1.30 cm    LVIDs 3.19 2.1 - 4.0 cm    FS 16 28 - 44 %    Sinus 3.13 cm    STJ 1.92 cm    LV mass 100.22 g    LA size 4.60 cm    RVDD 2.63 cm    TAPSE 1.16 cm    Left Ventricle Relative Wall Thickness 0.46 cm    AV mean gradient 4 mmHg    AV valve area 1.16 cm2    AV Velocity Ratio 0.65     AV index (prosthetic) 0.84     MV valve area p 1/2 method 4.50 cm2    E/A ratio 2.31     E wave deceleration time 168.63 msec    LVOT diameter 1.33 cm    LVOT area 1.4 cm2    LVOT peak irwin 0.85 m/s    LVOT peak VTI 17.70 cm    Ao peak irwin 1.31 m/s    Ao VTI 21.1 cm    LVOT stroke volume 24.58 cm3    AV peak gradient 7 mmHg    MV Peak E Irwin 0.97 m/s    TR Max Irwin 2.41 m/s    MV stenosis pressure 1/2 time 48.90 ms    MV Peak A Irwin 0.42 m/s    LV Systolic Volume 40.55 mL    LV Systolic Volume Index 21.6 mL/m2    LV Diastolic Volume 61.13 mL    LV Diastolic Volume Index 32.52 mL/m2    LV Mass Index 53 g/m2    RA Major Axis 5.37 cm    Left Atrium Minor Axis 3.55 cm    Triscuspid Valve Regurgitation Peak Gradient 23 mmHg    RA Width 2.52 cm    Right Atrial Pressure (from IVC) 3 mmHg    EF 55 %    TV rest pulmonary artery pressure 26 mmHg    Narrative    · Technically difficult study.  · Atrial  fibrillation observed.  · The left ventricle is normal in size with concentric remodeling and  · The estimated ejection fraction is 55%.  · Normal right ventricular size with normal right ventricular systolic   function.  · Normal central venous pressure (3 mmHg).  · The estimated PA systolic pressure is 26 mmHg.  · There is no pulmonary hypertension.
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
## 2023-01-30 NOTE — NURSING
[facility] Intensive Care  ICU Shift Summary  Date: 1/30/2023      Prehospitalization: Home  Admit Date / LOS : 1/29/2023/ 1 days    Diagnosis: Atrial fibrillation with RVR    Consults:        Active: Palliative and Psych       Needed: N/A     Code Status: Full Code   Advanced Directive: <no information>    LDA:  Lines/Drains/Airways       Peripheral Intravenous Line  Duration                  Peripheral IV - Single Lumen 01/29/23 1247 20 G Left Hand 1 day         Peripheral IV - Single Lumen 01/29/23 1313 20 G Right Antecubital 1 day                  Central Lines/Site/Justification:Patient Does Not Have Central Line  Urinary Cath/Order/Justification:Patient Does Not Have Urinary Catheter    Vasopressors/Infusions:        GOALS: Volume/ Hemodynamic: N/A                     RASS: 0  alert and calm    Pain Management: none       Pain Controlled: yes     Rhythm: NSR    Respiratory Device: Room Air                  Most Recent SBT/ SAT: N/A       MOVE Screen: PT Consult  ICU Liberation: yes    VTE Prophylaxis: Pharm  Mobility: Bedrest  Stress Ulcer Prophylaxis: Yes    Isolation: No active isolations    Dietary:   Current Diet Order   Procedures    Diet Cardiac [facility]; PEC/CEC - Styrofoam     Order Specific Question:   Indicate patient location for additional diet options:     Answer:   [facility]     Order Specific Question:   Tray type:     Answer:   PEC/CEC - Styrofoam      Tolerance: yes  Advancement: @ goal    I & O (24h):    Intake/Output Summary (Last 24 hours) at 1/30/2023 1602  Last data filed at 1/30/2023 1314  Gross per 24 hour   Intake 1100.12 ml   Output 200 ml   Net 900.12 ml        Restraints: No    Significant Dates:  Post Op Date: N/A  Rescue Date: N/A  Imaging/ Diagnostics: N/A    Noteworthy Labs:  none    COVID Test: (--)  CBC/Anemia Labs: Coags:    Recent Labs   Lab 01/29/23  1313 01/30/23  0500   WBC 4.57 5.94   HGB 13.7* 13.5*   HCT 39.8* 39.4*    192   MCV 93 94   RDW 13.5  13.7    No results for input(s): PT, INR, APTT in the last 168 hours.     Chemistries:   Recent Labs   Lab 01/29/23  1313 01/30/23  0500    137   K 4.4 4.0    109   CO2 24 20*   BUN 23 25*   CREATININE 1.0 0.9   CALCIUM 9.5 8.4*   PROT 7.2  --    BILITOT 0.5  --    ALKPHOS 56  --    ALT 17  --    AST 22  --    MG  --  2.0   PHOS  --  3.3        Cardiac Enzymes: Ejection Fractions:    Recent Labs     01/29/23  1313 01/29/23  1710   TROPONINI 0.010 <0.006    EF   Date Value Ref Range Status   10/21/2022 55 % Final        POCT Glucose: HbA1c:    No results for input(s): POCTGLUCOSE in the last 168 hours. Hemoglobin A1C   Date Value Ref Range Status   04/28/2022 5.6 <5.7 % Final   10/19/2018 5.4 4.0 - 5.6 % Final     Comment:     ADA Screening Guidelines:  5.7-6.4%  Consistent with prediabetes  >or=6.5%  Consistent with diabetes  High levels of fetal hemoglobin interfere with the HbA1C  assay. Heterozygous hemoglobin variants (HbS, HgC, etc)do  not significantly interfere with this assay.   However, presence of multiple variants may affect accuracy.             ICU LOS 6h  Level of Care: OK to Transfer    Chart Check: 12 HR Done  Shift Summary/Plan for the shift: Pt admitted to ICU now tele border. NS on monitor on RA. PEC order in place, sitter at bedside. Belongings sent to security.  Psychiatrist came to bedside to assess patient. Pt is awake and alert confused at times. Voiding in urinal. Tolerating diet. No falls, injuries, or skin breakdown.

## 2023-01-30 NOTE — PLAN OF CARE
Recommendations  1) Continue Cardiac diet   2) If PO < 50% meals, Add Boost Plus BID   3) Weekly wts   4) RD to follow    Goals: Pt to consistently meet greater than 75% EEN by RD follow up  Nutrition Goal Status: new  Communication of RD Recs: other (comment) (POC)    Assessment and Plan  Nutrition Problem  Inadequate oral intake    Related to (etiology):   Decreased appetite    Signs and Symptoms (as evidenced by):   Pt reports decreased PO in MST screen  3.7% wt loss x 3 months    Interventions(treatment strategy):  Collaboration with other providers  Fat and mineral modified diet    Nutrition Diagnosis Status:   New

## 2023-01-30 NOTE — ASSESSMENT & PLAN NOTE
Patient endorses suicidal ideation  Psychiatry evaluated in the ER; currently PEC'd  Inpatient psychiatric hospitalization once medically stable

## 2023-01-30 NOTE — NURSING
[facility] [place]  Nurses Note -- 4 Eyes      1/30/2023       Skin assessed on: Admit      [x] No Pressure Injuries Present    [x]Prevention Measures Documented    [] Yes LDA  for Pressure Injury Previously documented     [] Yes New Pressure Injury Discovered   [] LDA for New Pressure Injury Added      Attending RN:  Deborah العراقي RN     Second RN:  Alie WHITE

## 2023-01-30 NOTE — HPI
"This is an 82 year old male with a PMHx of Afib (on Eliquis), HTN, CVA, HLD, dementia who presented with malaise/high blood pressure.     Patient initially presented to the ER for hypertension, myalgias, insomnia and rhinorrhea. He is a poor historian, and most history was obtained from the chart. He reports non compliance of his medications they make him feel bad. Family is concerned that his dementia is getting worse and he is unable to take care of himself. He also reports having suicidal ideations; stating "why would I want to live if my health is like this". He denies having any previous attempts or having a plan for suicide. While in the ED, he went was initially hypertensive (229/100), later went into afib with RVR (120s), and had transient hypotension. Labs were unremarkable. CXR/CT head showed no acute process. Psychiatry was consulted given concern for suicidal ideation and recommended inpatient psychiatric hospitalization. He was given diltiazem 17 mg IV, hydralazine 10 mg IV x2, toradol 15 mg IV, amlodipine 5 mg and fluids. He was started on a diltiazem ggt and was admitted to the ICU.   "

## 2023-01-30 NOTE — CONSULTS
[facility] Emergency Dept  Cardiology  Consult Note    Patient Name: Kierra Daugherty  MRN: [id number]  Admission Date: 1/29/2023  Hospital Length of Stay: 1 days  Code Status: Full Code   Attending Provider: Jaswinder Lockhart MD   Consulting Provider: Barry Whittaker MD  Primary Care Physician: Antonieta Khalil MD  Principal Problem:Atrial fibrillation with RVR    Patient information was obtained from past medical records and ER records.     Inpatient consult to Cardiology  Consult performed by: Barry Whittaker MD  Consult ordered by: Seamus Portillo MD        Subjective:     Chief Complaint:  Hypertension/Atrial fibrillation     HPI:   Kierra Daugherty is an 81yo male who presents with elevated blood pressure 229/100. Subsequently noted to go into atrial fibrillation.  He has a history of poorly-controlled hypertension.  Paroxysmal atrial fibrillation.  He was seen as a consultation in October 2022.  Patient was given diltiazem for his blood pressure as well as heart rate control.  He also received hydralazine and amlodipine.  Last blood pressure showed a systolic of 129.  History of stroke.  Question of dementia.  Chronic microvascular ischemic changes noted on CT.  Outpatient regimen shows that he is on Eliquis.     Echocardiogram 10/21/2022:     Technically difficult study.   Atrial fibrillation observed.   The left ventricle is normal in size with concentric remodeling and   The estimated ejection fraction is 55%.   Normal right ventricular size with normal right ventricular systolic function.   Normal central venous pressure (3 mmHg).   The estimated PA systolic pressure is 26 mmHg.   There is no pulmonary hypertension.     Echocardiogram 04/29/2022:     CONCLUSIONS     Mild left ventricular hypertrophy.     Hyperdynamic left ventricular systolic function.     Left ventricular ejection fraction is estimated at >65%.     Mild mitral annular calcification.     Aortic valve sclerosis without stenosis.     Mild  tricuspid valve regurgitation.     Mild pulmonary valve regurgitation.     Bubble study performed which appeared to be negative at rest and wtih valsalva.      Carotid ultrasound 04/29/2022:     1.   Moderate stenosis of the left ICA. No high-grade stenosis of the right ICA.   2.   Moderate atherosclerotic plaque of bilateral cervical carotid arterial systems.   3.   Forward flow both vertebral arteries.         MRI brain 04/27/2022:     IMPRESSION:     Tiny acute lacunar infarct within the left frontal horn periventricular white matter propagating into the corona radiata. No acute large territorial infarct.     Background cerebral atrophy, chronic microangiopathy, and multiple old lacunar infarcts.       Past Medical History:   Diagnosis Date    Chronic kidney disease     Fibromyalgia     Fibromyalgia     HSV (herpes simplex virus) anogenital infection     Hypertension     does not take medication    Mental disorder     history of depression       Past Surgical History:   Procedure Laterality Date    APPENDECTOMY      Collapse Lung  1969    GUM SURGERY      HERNIA REPAIR      Left Shoulder      tonsilect      TONSILLECTOMY, ADENOIDECTOMY         Review of patient's allergies indicates:   Allergen Reactions    Cymbalta [duloxetine] Other (See Comments)     Kidney failure    Iodine and iodide containing products Other (See Comments)     Chest, bladder pain    Latex, natural rubber Other (See Comments)     unknown    Asa [aspirin]      No rash, but feels bad when he takes it       No current facility-administered medications on file prior to encounter.     Current Outpatient Medications on File Prior to Encounter   Medication Sig    apixaban (ELIQUIS) 5 mg Tab Take 1 tablet (5 mg total) by mouth 2 (two) times daily.    hydroCHLOROthiazide (HYDRODIURIL) 25 MG tablet Take 25 mg by mouth.    metoprolol succinate (TOPROL-XL) 50 MG 24 hr tablet Take 1 tablet (50 mg total) by mouth 2 (two) times daily.     rosuvastatin (CRESTOR) 40 MG Tab Take 1 tablet (40 mg total) by mouth every evening.     Family History       Problem Relation (Age of Onset)    Cancer Father, Brother    Heart disease Mother, Brother    Liver disease Sister    Neuropathy Sister    Stroke Mother          Tobacco Use    Smoking status: Former     Types: Cigarettes     Start date: 1952     Quit date: 4/10/1983     Years since quittin.8    Smokeless tobacco: Never   Substance and Sexual Activity    Alcohol use: No     Comment: Heavy at one time.    Drug use: No    Sexual activity: Never     Review of Systems   Unable to perform ROS: Dementia   Objective:     Vital Signs (Most Recent):  Temp: 98.1 °F (36.7 °C) (23 0706)  Pulse: 65 (23 08)  Resp: 20 (23)  BP: 128/60 (23)  SpO2: 96 % (23) Vital Signs (24h Range):  Temp:  [97.9 °F (36.6 °C)-98.1 °F (36.7 °C)] 98.1 °F (36.7 °C)  Pulse:  [] 65  Resp:  [14-30] 20  SpO2:  [92 %-98 %] 96 %  BP: ()/() 128/60     Weight: 68 kg (150 lb)  Body mass index is 22.15 kg/m².    SpO2: 96 %         Intake/Output Summary (Last 24 hours) at 2023 0807  Last data filed at 2023 0247  Gross per 24 hour   Intake 1000.12 ml   Output --   Net 1000.12 ml       Lines/Drains/Airways       Peripheral Intravenous Line  Duration                  Peripheral IV - Single Lumen 23 1247 20 G Left Hand <1 day         Peripheral IV - Single Lumen 23 1313 20 G Right Antecubital <1 day                    Physical Exam  Vitals reviewed.   Constitutional:       General: He is not in acute distress.     Appearance: He is not diaphoretic.   Neck:      Vascular: No carotid bruit or JVD.   Cardiovascular:      Rate and Rhythm: Normal rate and regular rhythm.      Pulses: Normal pulses.   Pulmonary:      Effort: Pulmonary effort is normal.      Breath sounds: Normal breath sounds.   Abdominal:      General: Bowel sounds are normal.      Palpations:  Abdomen is soft.      Tenderness: There is no abdominal tenderness.   Musculoskeletal:      Right lower leg: No edema.      Left lower leg: No edema.   Neurological:      Mental Status: He is alert. He is disoriented and confused.      Motor: Motor function is intact.   Psychiatric:         Speech: Speech normal.         Cognition and Memory: Memory is impaired.       Significant Labs: CMP   Recent Labs   Lab 01/29/23  1313 01/30/23  0500    137   K 4.4 4.0    109   CO2 24 20*   GLU 88 102   BUN 23 25*   CREATININE 1.0 0.9   CALCIUM 9.5 8.4*   PROT 7.2  --    ALBUMIN 4.1  --    BILITOT 0.5  --    ALKPHOS 56  --    AST 22  --    ALT 17  --    ANIONGAP 12 8   , CBC   Recent Labs   Lab 01/29/23  1313 01/30/23  0500   WBC 4.57 5.94   HGB 13.7* 13.5*   HCT 39.8* 39.4*    192   , Lipid Panel No results for input(s): CHOL, HDL, LDLCALC, TRIG, CHOLHDL in the last 48 hours., and Troponin   Recent Labs   Lab 01/29/23  1313 01/29/23  1710   TROPONINI 0.010 <0.006       Significant Imaging: Echocardiogram: Transthoracic echo (TTE) complete (Cupid Only):   Results for orders placed or performed during the hospital encounter of 10/20/22   Echo   Result Value Ref Range    BSA 1.88 m2    LA WIDTH 3.60 cm    Left Ventricular Outflow Tract Mean Velocity 0.73 cm/s    Left Ventricular Outflow Tract Mean Gradient 2.24 mmHg    PV PEAK VELOCITY 0.68 cm/s    LVIDd 3.78 3.5 - 6.0 cm    IVS 0.94 0.6 - 1.1 cm    Posterior Wall 0.86 0.6 - 1.1 cm    Ao root annulus 1.30 cm    LVIDs 3.19 2.1 - 4.0 cm    FS 16 28 - 44 %    Sinus 3.13 cm    STJ 1.92 cm    LV mass 100.22 g    LA size 4.60 cm    RVDD 2.63 cm    TAPSE 1.16 cm    Left Ventricle Relative Wall Thickness 0.46 cm    AV mean gradient 4 mmHg    AV valve area 1.16 cm2    AV Velocity Ratio 0.65     AV index (prosthetic) 0.84     MV valve area p 1/2 method 4.50 cm2    E/A ratio 2.31     E wave deceleration time 168.63 msec    LVOT diameter 1.33 cm    LVOT area 1.4 cm2     LVOT peak irwin 0.85 m/s    LVOT peak VTI 17.70 cm    Ao peak irwin 1.31 m/s    Ao VTI 21.1 cm    LVOT stroke volume 24.58 cm3    AV peak gradient 7 mmHg    MV Peak E Irwin 0.97 m/s    TR Max Irwin 2.41 m/s    MV stenosis pressure 1/2 time 48.90 ms    MV Peak A Irwin 0.42 m/s    LV Systolic Volume 40.55 mL    LV Systolic Volume Index 21.6 mL/m2    LV Diastolic Volume 61.13 mL    LV Diastolic Volume Index 32.52 mL/m2    LV Mass Index 53 g/m2    RA Major Axis 5.37 cm    Left Atrium Minor Axis 3.55 cm    Triscuspid Valve Regurgitation Peak Gradient 23 mmHg    RA Width 2.52 cm    Right Atrial Pressure (from IVC) 3 mmHg    EF 55 %    TV rest pulmonary artery pressure 26 mmHg    Narrative    · Technically difficult study.  · Atrial fibrillation observed.  · The left ventricle is normal in size with concentric remodeling and  · The estimated ejection fraction is 55%.  · Normal right ventricular size with normal right ventricular systolic   function.  · Normal central venous pressure (3 mmHg).  · The estimated PA systolic pressure is 26 mmHg.  · There is no pulmonary hypertension.        Assessment and Plan:     * Atrial fibrillation with RVR  01/30/2023-converted to sinus.  On Eliquis as outpatient.  On Toprol for rate control as an outpatient.  Would continue.    Primary hypertension  01/30/2023-monitor.    Mixed hyperlipidemia  01/30/2023-continue statin.        VTE Risk Mitigation (From admission, onward)         Ordered     apixaban tablet 5 mg  2 times daily         01/29/23 2332     IP VTE HIGH RISK PATIENT  Once         01/29/23 2332     Place sequential compression device  Until discontinued         01/29/23 2332                Thank you for your consult. I will follow-up with patient. Please contact us if you have any additional questions.    Barry Whittaker MD  Cardiology   [facility] [place] - Emergency Dept

## 2023-01-30 NOTE — PROGRESS NOTES
"[facility] [place]  [place]ult Nutrition  Progress Note    SUMMARY     Recommendations  1) Continue Cardiac diet   2) If PO < 50% meals, Add Boost Plus BID   3) Weekly wts   4) RD to follow    Goals: Pt to consistently meet greater than 75% EEN by RD follow up  Nutrition Goal Status: new  Communication of RD Recs: other (comment) (POC)    Assessment and Plan  Nutrition Problem  Inadequate oral intake    Related to (etiology):   Decreased appetite    Signs and Symptoms (as evidenced by):   Pt reports decreased PO in MST screen  3.7% wt loss x 3 months    Interventions(treatment strategy):  Collaboration with other providers  Fat and mineral modified diet    Nutrition Diagnosis Status:   New      Malnutrition Assessment  JAZZ NFPE due to RD remote  Pt endorses decreased PO/appetite  75% intake at lunch  3.7% wt loss x 3 month    Reason for Assessment  Reason For Assessment: identified at risk by screening criteria (MST 3)  Diagnosis: other (see comments), psychological disorder (Afib)  Relevant Medical History: Afib (on Eliquis), HTN, CVA, HLD, dementia  Interdisciplinary Rounds: did not attend  General Information Comments: Pt admitted with A-fib, SI, PEC ordered. On cardiac diet, 75% intake at lunch today. MST screen showing pt with decreased appetite but is unsure of any wt loss. Wt history showing UBW ~ 160 lbs which reveals a 3.7% wt loss x 3 month, not significant. JAZZ NFPE due to RD remote. Pt with SI - plan to d/c to inpt psych when medically cleared for discharge.  Nutrition Discharge Planning: Discharge on cardiac diet    Nutrition Risk Screen  Nutrition Risk Screen: no indicators present    Nutrition/Diet History  Factors Affecting Nutritional Intake: decreased appetite, impaired cognitive status/motor control, pain    Anthropometrics  Temp: 98.4 °F (36.9 °C)  Height Method: Stated  Height: 5' 9" (175.3 cm)  Height (inches): 69 in  Weight Method: Bed Scale  Weight: 69.8 kg (153 lb 14.1 oz)  Weight (lb): " 153.88 lb  Ideal Body Weight (IBW), Male: 160 lb  % Ideal Body Weight, Male (lb): 96.18 %  BMI (Calculated): 22.7       Lab/Procedures/Meds  Pertinent Labs Reviewed: reviewed  Pertinent Labs Comments: BUN 25, Ca 8.4  Pertinent Medications Reviewed: reviewed  Pertinent Medications Comments: apixaban, atorvastatin, carvedilol, famotidine    Estimated/Assessed Needs  Weight Used For Calorie Calculations: 69.4 kg (153 lb)  Energy Calorie Requirements (kcal): 1736 kcal/day based on MSJ x 1.2 AF  Energy Need Method: Hercules-St Jeor  Protein Requirements: 70-83 g/day based on 1-1.2 g/kg  Weight Used For Protein Calculations: 69.4 kg (153 lb)  Fluid Requirements (mL): 1 mL/kcal or per MD  Estimated Fluid Requirement Method: RDA Method  RDA Method (mL): 1736       Nutrition Prescription Ordered  Current Diet Order: Cardiac    Evaluation of Received Nutrient/Fluid Intake    Intake/Output Summary (Last 24 hours) at 1/30/2023 1505  Last data filed at 1/30/2023 1314  Gross per 24 hour   Intake 1100.12 ml   Output 200 ml   Net 900.12 ml     I/O: +1 L since admit  Energy Calories Required: meeting needs  Protein Required: meeting needs  Fluid Required: meeting needs  Comments: LBM 1/28  Tolerance: tolerating  % Intake of Estimated Energy Needs: 75 - 100 %  % Meal Intake: 75 - 100 %    Nutrition Risk  Level of Risk/Frequency of Follow-up: low (1x weekly)     Monitor and Evaluation  Food and Nutrient Intake: energy intake, food and beverage intake  Food and Nutrient Adminstration: diet order  Knowledge/Beliefs/Attitudes: beliefs and attitudes  Physical Activity and Function: nutrition-related ADLs and IADLs  Anthropometric Measurements: weight change, weight, body mass index  Biochemical Data, Medical Tests and Procedures: electrolyte and renal panel, lipid profile, gastrointestinal profile, glucose/endocrine profile, inflammatory profile  Nutrition-Focused Physical Findings: overall appearance, extremities, muscles and bones, skin      Nutrition Follow-Up  RD Follow-up?: Yes

## 2023-01-30 NOTE — ED NOTES
Both valuable envelopes handed off to gomez Christianson supervisor. Envelope #[id number] and envelope #[id number]

## 2023-01-30 NOTE — ASSESSMENT & PLAN NOTE
[id number] Score: 3   Diltiazem ggt   Resume home medications   Cardiology consult  Telemetry monitoring
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
## 2023-01-31 VITALS
SYSTOLIC BLOOD PRESSURE: 112 MMHG | BODY MASS INDEX: 23.35 KG/M2 | HEIGHT: 69 IN | RESPIRATION RATE: 18 BRPM | WEIGHT: 157.63 LBS | HEART RATE: 69 BPM | TEMPERATURE: 98 F | OXYGEN SATURATION: 95 % | DIASTOLIC BLOOD PRESSURE: 56 MMHG

## 2023-01-31 PROCEDURE — 25000003 PHARM REV CODE 250: Performed by: STUDENT IN AN ORGANIZED HEALTH CARE EDUCATION/TRAINING PROGRAM

## 2023-01-31 PROCEDURE — 99291 CRITICAL CARE FIRST HOUR: CPT | Mod: 95,GC,, | Performed by: INTERNAL MEDICINE

## 2023-01-31 PROCEDURE — 99291 PR CRITICAL CARE, E/M 30-74 MINUTES: ICD-10-PCS | Mod: 95,GC,, | Performed by: INTERNAL MEDICINE

## 2023-01-31 PROCEDURE — 63600175 PHARM REV CODE 636 W HCPCS: Performed by: SURGERY

## 2023-01-31 PROCEDURE — 63600175 PHARM REV CODE 636 W HCPCS: Performed by: STUDENT IN AN ORGANIZED HEALTH CARE EDUCATION/TRAINING PROGRAM

## 2023-01-31 RX ORDER — CARVEDILOL 6.25 MG/1
6.25 TABLET ORAL 2 TIMES DAILY WITH MEALS
Qty: 60 TABLET | Refills: 11 | Status: SHIPPED | OUTPATIENT
Start: 2023-01-31 | End: 2024-01-31

## 2023-01-31 RX ORDER — AMLODIPINE BESYLATE 5 MG/1
5 TABLET ORAL DAILY
Status: DISCONTINUED | OUTPATIENT
Start: 2023-01-31 | End: 2023-01-31 | Stop reason: HOSPADM

## 2023-01-31 RX ORDER — HYDRALAZINE HYDROCHLORIDE 20 MG/ML
5 INJECTION INTRAMUSCULAR; INTRAVENOUS EVERY 6 HOURS PRN
Status: DISCONTINUED | OUTPATIENT
Start: 2023-01-31 | End: 2023-01-31 | Stop reason: HOSPADM

## 2023-01-31 RX ADMIN — PROCHLORPERAZINE EDISYLATE 5 MG: 5 INJECTION INTRAMUSCULAR; INTRAVENOUS at 01:01

## 2023-01-31 RX ADMIN — CARVEDILOL 6.25 MG: 6.25 TABLET, FILM COATED ORAL at 08:01

## 2023-01-31 RX ADMIN — APIXABAN 5 MG: 5 TABLET, FILM COATED ORAL at 08:01

## 2023-01-31 RX ADMIN — HYDRALAZINE HYDROCHLORIDE 5 MG: 20 INJECTION, SOLUTION INTRAMUSCULAR; INTRAVENOUS at 05:01

## 2023-01-31 RX ADMIN — FAMOTIDINE 20 MG: 10 INJECTION INTRAVENOUS at 08:01

## 2023-01-31 RX ADMIN — SERTRALINE HYDROCHLORIDE 25 MG: 25 TABLET ORAL at 08:01

## 2023-01-31 RX ADMIN — MUPIROCIN: 20 OINTMENT TOPICAL at 08:01

## 2023-01-31 RX ADMIN — ATORVASTATIN CALCIUM 80 MG: 40 TABLET, FILM COATED ORAL at 08:01

## 2023-01-31 NOTE — PROGRESS NOTES
[facility] - [place] Intensive Care  Cardiology  Progress Note    Patient Name: Kierra Daugherty  MRN: [id number]  Admission Date: 1/29/2023  Hospital Length of Stay: 2 days  Code Status: Full Code   Attending Physician: Rk Garcia MD   Primary Care Physician: Antonieta Khalil MD  Expected Discharge Date: 1/31/2023  Principal Problem:Atrial fibrillation with RVR    Subjective:     Hospital Course:   No notes on file    Interval History: Required PRN anti hypertensive medication    Review of Systems   Unable to perform ROS: Dementia   Objective:     Vital Signs (Most Recent):  Temp: 97.5 °F (36.4 °C) (01/31/23 0710)  Pulse: 80 (01/31/23 0900)  Resp: 20 (01/31/23 0900)  BP: (!) 166/77 (01/31/23 0900)  SpO2: 95 % (01/31/23 0900)   Vital Signs (24h Range):  Temp:  [96.7 °F (35.9 °C)-98.4 °F (36.9 °C)] 97.5 °F (36.4 °C)  Pulse:  [61-84] 80  Resp:  [13-41] 20  SpO2:  [94 %-98 %] 95 %  BP: (100-219)/(53-99) 166/77     Weight: 71.5 kg (157 lb 10.1 oz)  Body mass index is 23.28 kg/m².     SpO2: 95 %         Intake/Output Summary (Last 24 hours) at 1/31/2023 0932  Last data filed at 1/31/2023 0831  Gross per 24 hour   Intake 260 ml   Output 700 ml   Net -440 ml       Lines/Drains/Airways       Peripheral Intravenous Line  Duration                  Peripheral IV - Single Lumen 01/29/23 1247 20 G Left Hand 1 day         Peripheral IV - Single Lumen 01/29/23 1313 20 G Right Antecubital 1 day                    Physical Exam  Vitals reviewed.   Constitutional:       General: He is not in acute distress.     Appearance: He is not diaphoretic.   Neck:      Vascular: No carotid bruit or JVD.   Cardiovascular:      Rate and Rhythm: Normal rate and regular rhythm.      Pulses: Normal pulses.   Pulmonary:      Effort: Pulmonary effort is normal.      Breath sounds: Normal breath sounds.   Abdominal:      General: Bowel sounds are normal.      Palpations: Abdomen is soft.      Tenderness: There is no abdominal tenderness.   Musculoskeletal:       Right lower leg: No edema.      Left lower leg: No edema.   Neurological:      Mental Status: He is alert. He is disoriented and confused.      Motor: Motor function is intact.   Psychiatric:         Speech: Speech normal.         Cognition and Memory: Memory is impaired.       Significant Labs: CMP   Recent Labs   Lab 01/29/23  1313 01/30/23  0500    137   K 4.4 4.0    109   CO2 24 20*   GLU 88 102   BUN 23 25*   CREATININE 1.0 0.9   CALCIUM 9.5 8.4*   PROT 7.2  --    ALBUMIN 4.1  --    BILITOT 0.5  --    ALKPHOS 56  --    AST 22  --    ALT 17  --    ANIONGAP 12 8    and CBC   Recent Labs   Lab 01/29/23  1313 01/30/23  0500   WBC 4.57 5.94   HGB 13.7* 13.5*   HCT 39.8* 39.4*    192       Significant Imaging: Echocardiogram: Transthoracic echo (TTE) complete (Cupid Only):   Results for orders placed or performed during the hospital encounter of 10/20/22   Echo   Result Value Ref Range    BSA 1.88 m2    LA WIDTH 3.60 cm    Left Ventricular Outflow Tract Mean Velocity 0.73 cm/s    Left Ventricular Outflow Tract Mean Gradient 2.24 mmHg    PV PEAK VELOCITY 0.68 cm/s    LVIDd 3.78 3.5 - 6.0 cm    IVS 0.94 0.6 - 1.1 cm    Posterior Wall 0.86 0.6 - 1.1 cm    Ao root annulus 1.30 cm    LVIDs 3.19 2.1 - 4.0 cm    FS 16 28 - 44 %    Sinus 3.13 cm    STJ 1.92 cm    LV mass 100.22 g    LA size 4.60 cm    RVDD 2.63 cm    TAPSE 1.16 cm    Left Ventricle Relative Wall Thickness 0.46 cm    AV mean gradient 4 mmHg    AV valve area 1.16 cm2    AV Velocity Ratio 0.65     AV index (prosthetic) 0.84     MV valve area p 1/2 method 4.50 cm2    E/A ratio 2.31     E wave deceleration time 168.63 msec    LVOT diameter 1.33 cm    LVOT area 1.4 cm2    LVOT peak irwin 0.85 m/s    LVOT peak VTI 17.70 cm    Ao peak irwin 1.31 m/s    Ao VTI 21.1 cm    LVOT stroke volume 24.58 cm3    AV peak gradient 7 mmHg    MV Peak E Irwin 0.97 m/s    TR Max Irwin 2.41 m/s    MV stenosis pressure 1/2 time 48.90 ms    MV Peak A Irwin 0.42 m/s    LV  Systolic Volume 40.55 mL    LV Systolic Volume Index 21.6 mL/m2    LV Diastolic Volume 61.13 mL    LV Diastolic Volume Index 32.52 mL/m2    LV Mass Index 53 g/m2    RA Major Axis 5.37 cm    Left Atrium Minor Axis 3.55 cm    Triscuspid Valve Regurgitation Peak Gradient 23 mmHg    RA Width 2.52 cm    Right Atrial Pressure (from IVC) 3 mmHg    EF 55 %    TV rest pulmonary artery pressure 26 mmHg    Narrative    · Technically difficult study.  · Atrial fibrillation observed.  · The left ventricle is normal in size with concentric remodeling and  · The estimated ejection fraction is 55%.  · Normal right ventricular size with normal right ventricular systolic   function.  · Normal central venous pressure (3 mmHg).  · The estimated PA systolic pressure is 26 mmHg.  · There is no pulmonary hypertension.        Assessment and Plan:     Brief HPI:     * Atrial fibrillation with RVR  01/30/2023-converted to sinus.  On Eliquis as outpatient.  On Toprol for rate control as an outpatient.  Would continue.    Primary hypertension  01/30/2023-monitor.    01/31/2023 - add amlodipine 5 qd.     Mixed hyperlipidemia  01/30/2023-continue statin.        VTE Risk Mitigation (From admission, onward)         Ordered     apixaban tablet 5 mg  2 times daily         01/29/23 2332     IP VTE HIGH RISK PATIENT  Once         01/29/23 2332     Place sequential compression device  Until discontinued         01/29/23 2332              Critical Care Time: 30 minutes     Critical care was time spent personally by me on the following activities: development of treatment plan with patient or surrogate and bedside caregivers, discussions with consultants, evaluation of patient's response to treatment, examination of patient, ordering and performing treatments and interventions, ordering and review of laboratory studies, ordering and review of radiographic studies, pulse oximetry, re-evaluation of patient's condition. This critical care time did not  overlap with that of any other provider or involve time for any procedures.    Barry Whittaker MD  Cardiology  [facility] - Intensive Care

## 2023-01-31 NOTE — NURSING
Report called to [facility].CEC and PEC paperwork sent with patient. PT sonal Francois at bedside updated on patient condition. Pt stood and moved to stretcher. Pt compliant and pleasant through events. All belongings received from security and given to patient and Priscila. Security walked with patient off unit with EMS. All VSS at time of discharge. NS on monitor. No falls, injuries, or skin breakdown.
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
## 2023-01-31 NOTE — SUBJECTIVE & OBJECTIVE
Interval History: Required PRN anti hypertensive medication    Review of Systems   Unable to perform ROS: Dementia   Objective:     Vital Signs (Most Recent):  Temp: 97.5 °F (36.4 °C) (01/31/23 0710)  Pulse: 80 (01/31/23 0900)  Resp: 20 (01/31/23 0900)  BP: (!) 166/77 (01/31/23 0900)  SpO2: 95 % (01/31/23 0900)   Vital Signs (24h Range):  Temp:  [96.7 °F (35.9 °C)-98.4 °F (36.9 °C)] 97.5 °F (36.4 °C)  Pulse:  [61-84] 80  Resp:  [13-41] 20  SpO2:  [94 %-98 %] 95 %  BP: (100-219)/(53-99) 166/77     Weight: 71.5 kg (157 lb 10.1 oz)  Body mass index is 23.28 kg/m².     SpO2: 95 %         Intake/Output Summary (Last 24 hours) at 1/31/2023 0932  Last data filed at 1/31/2023 0831  Gross per 24 hour   Intake 260 ml   Output 700 ml   Net -440 ml       Lines/Drains/Airways       Peripheral Intravenous Line  Duration                  Peripheral IV - Single Lumen 01/29/23 1247 20 G Left Hand 1 day         Peripheral IV - Single Lumen 01/29/23 1313 20 G Right Antecubital 1 day                    Physical Exam  Vitals reviewed.   Constitutional:       General: He is not in acute distress.     Appearance: He is not diaphoretic.   Neck:      Vascular: No carotid bruit or JVD.   Cardiovascular:      Rate and Rhythm: Normal rate and regular rhythm.      Pulses: Normal pulses.   Pulmonary:      Effort: Pulmonary effort is normal.      Breath sounds: Normal breath sounds.   Abdominal:      General: Bowel sounds are normal.      Palpations: Abdomen is soft.      Tenderness: There is no abdominal tenderness.   Musculoskeletal:      Right lower leg: No edema.      Left lower leg: No edema.   Neurological:      Mental Status: He is alert. He is disoriented and confused.      Motor: Motor function is intact.   Psychiatric:         Speech: Speech normal.         Cognition and Memory: Memory is impaired.       Significant Labs: CMP   Recent Labs   Lab 01/29/23  1313 01/30/23  0500    137   K 4.4 4.0    109   CO2 24 20*   GLU 88  102   BUN 23 25*   CREATININE 1.0 0.9   CALCIUM 9.5 8.4*   PROT 7.2  --    ALBUMIN 4.1  --    BILITOT 0.5  --    ALKPHOS 56  --    AST 22  --    ALT 17  --    ANIONGAP 12 8    and CBC   Recent Labs   Lab 01/29/23  1313 01/30/23  0500   WBC 4.57 5.94   HGB 13.7* 13.5*   HCT 39.8* 39.4*    192       Significant Imaging: Echocardiogram: Transthoracic echo (TTE) complete (Cupid Only):   Results for orders placed or performed during the hospital encounter of 10/20/22   Echo   Result Value Ref Range    BSA 1.88 m2    LA WIDTH 3.60 cm    Left Ventricular Outflow Tract Mean Velocity 0.73 cm/s    Left Ventricular Outflow Tract Mean Gradient 2.24 mmHg    PV PEAK VELOCITY 0.68 cm/s    LVIDd 3.78 3.5 - 6.0 cm    IVS 0.94 0.6 - 1.1 cm    Posterior Wall 0.86 0.6 - 1.1 cm    Ao root annulus 1.30 cm    LVIDs 3.19 2.1 - 4.0 cm    FS 16 28 - 44 %    Sinus 3.13 cm    STJ 1.92 cm    LV mass 100.22 g    LA size 4.60 cm    RVDD 2.63 cm    TAPSE 1.16 cm    Left Ventricle Relative Wall Thickness 0.46 cm    AV mean gradient 4 mmHg    AV valve area 1.16 cm2    AV Velocity Ratio 0.65     AV index (prosthetic) 0.84     MV valve area p 1/2 method 4.50 cm2    E/A ratio 2.31     E wave deceleration time 168.63 msec    LVOT diameter 1.33 cm    LVOT area 1.4 cm2    LVOT peak irwin 0.85 m/s    LVOT peak VTI 17.70 cm    Ao peak irwin 1.31 m/s    Ao VTI 21.1 cm    LVOT stroke volume 24.58 cm3    AV peak gradient 7 mmHg    MV Peak E Irwin 0.97 m/s    TR Max Irwin 2.41 m/s    MV stenosis pressure 1/2 time 48.90 ms    MV Peak A Irwin 0.42 m/s    LV Systolic Volume 40.55 mL    LV Systolic Volume Index 21.6 mL/m2    LV Diastolic Volume 61.13 mL    LV Diastolic Volume Index 32.52 mL/m2    LV Mass Index 53 g/m2    RA Major Axis 5.37 cm    Left Atrium Minor Axis 3.55 cm    Triscuspid Valve Regurgitation Peak Gradient 23 mmHg    RA Width 2.52 cm    Right Atrial Pressure (from IVC) 3 mmHg    EF 55 %    TV rest pulmonary artery pressure 26 mmHg    Narrative    ·  Technically difficult study.  · Atrial fibrillation observed.  · The left ventricle is normal in size with concentric remodeling and  · The estimated ejection fraction is 55%.  · Normal right ventricular size with normal right ventricular systolic   function.  · Normal central venous pressure (3 mmHg).  · The estimated PA systolic pressure is 26 mmHg.  · There is no pulmonary hypertension.

## 2023-01-31 NOTE — HOSPITAL COURSE
PMHx of Afib (on Eliquis), HTN, CVA, HLD, dementia who presented with malaise/high blood pressure and in Afib RVR, severe depression with suicidal ideations.  PEC'd in ER. Patient admitted to ICU on Diltiazem drip and Cardiology consulted.  Patient converted back to NSR.  Toprol switched to Coreg.  He has remained afebrile and hemodynamically stable. He's medically cleared and will be discharged to inpatient psychiatry.

## 2023-01-31 NOTE — DISCHARGE SUMMARY
"[facility]  Discharge Summary      Patient Name: Kierra Daugherty  MRN: [id number]  MARIO: [id number]  Patient Class: IP- Inpatient  Admission Date: 1/29/2023  Hospital Length of Stay: 2 days  Discharge Date and Time:  01/31/2023 3:00 PM  Attending Physician: Rk Garcia MD   Discharging Provider: Rk Garcia MD  Primary Care Provider: Antonieta Khalil MD    Primary Care Team: Networked reference to record PCT     HPI:   This is an 82 year old male with a PMHx of Afib (on Eliquis), HTN, CVA, HLD, dementia who presented with malaise/high blood pressure.     Patient initially presented to the ER for hypertension, myalgias, insomnia and rhinorrhea. He is a poor historian, and most history was obtained from the chart. He reports non compliance of his medications they make him feel bad. Family is concerned that his dementia is getting worse and he is unable to take care of himself. He also reports having suicidal ideations; stating "why would I want to live if my health is like this". He denies having any previous attempts or having a plan for suicide. While in the ED, he went was initially hypertensive (229/100), later went into afib with RVR (120s), and had transient hypotension. Labs were unremarkable. CXR/CT head showed no acute process. Psychiatry was consulted given concern for suicidal ideation and recommended inpatient psychiatric hospitalization. He was given diltiazem 17 mg IV, hydralazine 10 mg IV x2, toradol 15 mg IV, amlodipine 5 mg and fluids. He was started on a diltiazem ggt and was admitted to the ICU.       * No surgery found *      Hospital Course:   PMHx of Afib (on Eliquis), HTN, CVA, HLD, dementia who presented with malaise/high blood pressure and in Afib RVR, severe depression with suicidal ideations.  PEC'd in ER. Patient admitted to ICU on Diltiazem drip and Cardiology consulted.  Patient converted back to NSR.  Toprol switched to Coreg.  He has remained " afebrile and hemodynamically stable. He's medically cleared and will be discharged to inpatient psychiatry.       Goals of Care Treatment Preferences:  Code Status: Full Code      Consults:   Consults (From admission, onward)        Status Ordering Provider     Inpatient consult to Social Work  Once        Provider:  (Not yet assigned)    Acknowledged VAMSHI RUIZ A     Inpatient consult to Palliative Care  Once        Provider:  (Not yet assigned)    Completed VAMSHI RUIZ A     Inpatient consult to Cardiology  Once        Provider:  Barry Whittaker MD    Completed REGINA PULIDO     Inpatient consult to Telemedicine - Psychiatry  Once        Provider:  Mari Sandoval MD    Completed FLAKITO SERNA          No new Assessment & Plan notes have been filed under this hospital service since the last note was generated.  Service: Hospital Medicine    Final Active Diagnoses:    Diagnosis Date Noted POA    PRINCIPAL PROBLEM:  Atrial fibrillation with RVR [I48.91] 10/21/2022 Yes    Primary hypertension [I10] 01/30/2023 Yes    Suicidal ideation [R45.851] 10/21/2022 Not Applicable    History of CVA (cerebrovascular accident) [Z86.73] 10/20/2022 Not Applicable    Mixed hyperlipidemia [E78.2] 10/21/2018 Yes      Problems Resolved During this Admission:       Discharged Condition: stable    Disposition: Psychiatry hospital    Follow Up:   Follow-up Information     [facility] [place] - Emergency Dept. Go to .    Specialty: Emergency Medicine  Why: Please go to [facility] emergency department if symptoms worsen  Contact information:  [address] [place] [address] [phone]           Alvaro Lynne MD. Schedule an appointment as soon as possible for a visit in 1 day.    Specialty: Internal Medicine  Contact information:  [address] [address] [facility]  [place] [address] [phone]                       Patient Instructions:      Ambulatory referral/consult to Internal Medicine   Referral Priority: Urgent Referral  Type: Consultation   Referral Reason: Specialty Services Required   Requested Specialty: Internal Medicine   Number of Visits Requested: 1     Diet Cardiac     Notify your health care provider if you experience any of the following:  temperature >100.4     Notify your health care provider if you experience any of the following:  persistent nausea and vomiting or diarrhea     Notify your health care provider if you experience any of the following:  severe uncontrolled pain     Notify your health care provider if you experience any of the following:  difficulty breathing or increased cough     Notify your health care provider if you experience any of the following:  persistent dizziness, light-headedness, or visual disturbances     Notify your health care provider if you experience any of the following:  increased confusion or weakness     Activity as tolerated           Pending Diagnostic Studies:     None         Medications:  Reconciled Home Medications:      Medication List      START taking these medications    acetaminophen 500 MG tablet  Commonly known as: TYLENOL  Take 1 tablet (500 mg total) by mouth every 6 (six) hours as needed for Pain (and fever).     carvediloL 6.25 MG tablet  Commonly known as: COREG  Take 1 tablet (6.25 mg total) by mouth 2 (two) times daily with meals.     LIDOcaine 5 %  Commonly known as: LIDODERM  Place 1 patch onto the skin once daily. Remove & Discard patch within 12 hours or as directed by MD     methocarbamoL 500 MG Tab  Commonly known as: ROBAXIN  Take 2 tablets (1,000 mg total) by mouth 3 (three) times daily. for 5 days        CHANGE how you take these medications    amLODIPine 5 MG tablet  Commonly known as: NORVASC  Take 1 tablet (5 mg total) by mouth once daily.  What changed:   · medication strength  · how much to take        CONTINUE taking these medications    apixaban 5 mg Tab  Commonly known as: ELIQUIS  Take 1 tablet (5 mg total) by mouth 2 (two) times daily.      hydroCHLOROthiazide 25 MG tablet  Commonly known as: HYDRODIURIL  Take 25 mg by mouth.     rosuvastatin 40 MG Tab  Commonly known as: CRESTOR  Take 1 tablet (40 mg total) by mouth every evening.        STOP taking these medications    metoprolol succinate 50 MG 24 hr tablet  Commonly known as: TOPROL-XL            Indwelling Lines/Drains at time of discharge:   Lines/Drains/Airways     None                 Time spent on the discharge of patient: >30 minutes    Rk Garcia MD  Department of Hospital Medicine  [facility] - Intensive Care

## 2023-01-31 NOTE — EICU
eICU Physician Virtual/Remote Brief Evaluation Note      Telephone call RN   SBP consistently in 180s to 190s  Has a history of noncompliance with blood pressure medication   Chart reviewed, discussed with RN  /95, P 72, RR 25, O2 sat 94  He is calmer now resting comfortably  Hydralazine 5 mg IV q.6h p.r.n. SBP greater than 170 ordered      JOS Weaver MD  [facility] Attending  [phone]    This report has been created through the use of [employer] dictation software. Typographical and content errors may occur with this process. While efforts are made to detect and correct such errors, in some cases errors will persist. For this reason, wording in this document should be considered in the proper context and not strictly verbatim

## 2025-01-17 NOTE — PROGRESS NOTES
[facility] Surg  Cardiology  Progress Note    Patient Name: Kierra Daugherty  MRN: [id number]  Admission Date: 10/20/2022  Hospital Length of Stay: 3 days  Code Status: Full Code   Attending Physician: Meredith Platt MD   Primary Care Physician: Antonieta Khalil MD  Expected Discharge Date: 10/23/2022  Principal Problem:New onset atrial fibrillation    Subjective:     Hospital Course:   No notes on file    Interval History: maintaining sinus    Review of Systems   Constitutional: Negative for diaphoresis and malaise/fatigue.   Cardiovascular:  Negative for chest pain, dyspnea on exertion, irregular heartbeat, leg swelling, near-syncope, orthopnea, palpitations, paroxysmal nocturnal dyspnea and syncope.   Respiratory:  Negative for shortness of breath, sputum production and wheezing.    Gastrointestinal:  Negative for abdominal pain, heartburn, hematemesis and melena.   Neurological:  Negative for dizziness, focal weakness, light-headedness, numbness, paresthesias, seizures and weakness.   Objective:     Vital Signs (Most Recent):  Temp: 97.8 °F (36.6 °C) (10/23/22 0753)  Pulse: 63 (10/23/22 0753)  Resp: (!) 21 (10/23/22 0753)  BP: (!) 176/78 (10/23/22 0753)  SpO2: 96 % (10/23/22 0753)   Vital Signs (24h Range):  Temp:  [97.6 °F (36.4 °C)-98.1 °F (36.7 °C)] 97.8 °F (36.6 °C)  Pulse:  [53-79] 63  Resp:  [2-33] 21  SpO2:  [92 %-98 %] 96 %  BP: (117-189)/() 176/78     Weight: 72.4 kg (159 lb 9.8 oz)  Body mass index is 23.57 kg/m².     SpO2: 96 %  O2 Device (Oxygen Therapy): room air      Intake/Output Summary (Last 24 hours) at 10/23/2022 1045  Last data filed at 10/23/2022 0800  Gross per 24 hour   Intake 360 ml   Output 900 ml   Net -540 ml       Lines/Drains/Airways       Peripheral Intravenous Line  Duration                  Peripheral IV - Single Lumen 10/20/22 1623 22 G Anterior;Distal;Right Forearm 2 days         Peripheral IV - Single Lumen 10/21/22 1418 20 G Right Antecubital 1 day               Labs and CHF clinic note reviewed  Increase lasix 40 mg daily   Increase lisinopril 10 mg daily   Follow up labs in 1 week   Follow up in CHF clinic in 2 weeks - sooner if needed    Thank you        Physical Exam  Vitals reviewed.   Constitutional:       General: He is not in acute distress.     Appearance: He is not diaphoretic.   Neck:      Vascular: No carotid bruit or JVD.   Cardiovascular:      Rate and Rhythm: Normal rate and regular rhythm.      Pulses: Normal pulses.   Pulmonary:      Effort: Pulmonary effort is normal.      Breath sounds: Normal breath sounds.   Abdominal:      General: Bowel sounds are normal.      Palpations: Abdomen is soft.      Tenderness: There is no abdominal tenderness.   Musculoskeletal:      Right lower leg: No edema.      Left lower leg: No edema.   Neurological:      Mental Status: He is alert and oriented to person, place, and time.   Psychiatric:         Speech: Speech normal.         Behavior: Behavior normal.       Significant Labs: CMP   Recent Labs   Lab 10/22/22  0403      K 3.9      CO2 21*      BUN 27*   CREATININE 1.1   CALCIUM 9.2   ANIONGAP 13    and CBC No results for input(s): WBC, HGB, HCT, PLT in the last 48 hours.    Significant Imaging: Echocardiogram: Transthoracic echo (TTE) complete (Cupid Only):   Results for orders placed or performed during the hospital encounter of 10/20/22   Echo   Result Value Ref Range    BSA 1.88 m2    LA WIDTH 3.60 cm    Left Ventricular Outflow Tract Mean Velocity 0.73 cm/s    Left Ventricular Outflow Tract Mean Gradient 2.24 mmHg    PV PEAK VELOCITY 0.68 cm/s    LVIDd 3.78 3.5 - 6.0 cm    IVS 0.94 0.6 - 1.1 cm    Posterior Wall 0.86 0.6 - 1.1 cm    Ao root annulus 1.30 cm    LVIDs 3.19 2.1 - 4.0 cm    FS 16 28 - 44 %    Sinus 3.13 cm    STJ 1.92 cm    LV mass 100.22 g    LA size 4.60 cm    RVDD 2.63 cm    TAPSE 1.16 cm    Left Ventricle Relative Wall Thickness 0.46 cm    AV mean gradient 4 mmHg    AV valve area 1.16 cm2    AV Velocity Ratio 0.65     AV index (prosthetic) 0.84     MV valve area p 1/2 method 4.50 cm2    E/A ratio 2.31     E wave deceleration time 168.63 msec    LVOT diameter 1.33 cm     LVOT area 1.4 cm2    LVOT peak irwin 0.85 m/s    LVOT peak VTI 17.70 cm    Ao peak irwin 1.31 m/s    Ao VTI 21.1 cm    LVOT stroke volume 24.58 cm3    AV peak gradient 7 mmHg    MV Peak E Irwin 0.97 m/s    TR Max Irwin 2.41 m/s    MV stenosis pressure 1/2 time 48.90 ms    MV Peak A Irwin 0.42 m/s    LV Systolic Volume 40.55 mL    LV Systolic Volume Index 21.6 mL/m2    LV Diastolic Volume 61.13 mL    LV Diastolic Volume Index 32.52 mL/m2    LV Mass Index 53 g/m2    RA Major Axis 5.37 cm    Left Atrium Minor Axis 3.55 cm    Triscuspid Valve Regurgitation Peak Gradient 23 mmHg    RA Width 2.52 cm    Right Atrial Pressure (from IVC) 3 mmHg    EF 55 %    TV rest pulmonary artery pressure 26 mmHg    Narrative    · Technically difficult study.  · Atrial fibrillation observed.  · The left ventricle is normal in size with concentric remodeling and  · The estimated ejection fraction is 55%.  · Normal right ventricular size with normal right ventricular systolic   function.  · Normal central venous pressure (3 mmHg).  · The estimated PA systolic pressure is 26 mmHg.  · There is no pulmonary hypertension.        Assessment and Plan:     Brief HPI:     * New onset atrial fibrillation  10/21/2022:  Patient started on heparin drip.  Lopressor for rate control.  Patient did eat this morning.  Currently nauseated.  Will change to Toprol twice daily with hopes of converting to sinus rhythm.  If not can proceed with cardioversion in a.m. on 10/22/2022.  Would be within 48 hours of onset.  No need for transesophageal echocardiogram.    10/22/2022: converted to sinus. Some heart rates into 50s. Will decrease toprol.    1023/2022:  Maintaining sinus rhythm.  Continue rate control and anticoagulation.    Hypertensive emergency  10/21/2022: Cardene stopped. Monitor on current regimen.    10/22/2022: monitor.    Mixed hyperlipidemia  10/21/2022: continue statin.        VTE Risk Mitigation (From admission, onward)         Ordered     apixaban tablet 5  mg  2 times daily         10/21/22 1802     IP VTE HIGH RISK PATIENT  Once         10/20/22 2139     Place sequential compression device  Until discontinued         10/20/22 2139              Will sign off for now. Re consult as needed.    Critical Care Time: 30 minutes     Critical care was time spent personally by me on the following activities: development of treatment plan with patient or surrogate and bedside caregivers, discussions with consultants, evaluation of patient's response to treatment, examination of patient, ordering and performing treatments and interventions, ordering and review of laboratory studies, ordering and review of radiographic studies, pulse oximetry, re-evaluation of patient's condition. This critical care time did not overlap with that of any other provider or involve time for any procedures.    Barry Whittaker MD  Cardiology  [facility] Surg